# Patient Record
Sex: FEMALE | Race: WHITE | NOT HISPANIC OR LATINO | Employment: FULL TIME | URBAN - METROPOLITAN AREA
[De-identification: names, ages, dates, MRNs, and addresses within clinical notes are randomized per-mention and may not be internally consistent; named-entity substitution may affect disease eponyms.]

---

## 2017-03-10 ENCOUNTER — OFFICE VISIT (OUTPATIENT)
Dept: URGENT CARE | Facility: CLINIC | Age: 37
End: 2017-03-10
Payer: COMMERCIAL

## 2017-03-10 VITALS
DIASTOLIC BLOOD PRESSURE: 84 MMHG | SYSTOLIC BLOOD PRESSURE: 126 MMHG | RESPIRATION RATE: 20 BRPM | HEART RATE: 118 BPM | TEMPERATURE: 97.3 F | OXYGEN SATURATION: 96 %

## 2017-03-10 DIAGNOSIS — H66.002 ACUTE SUPPURATIVE OTITIS MEDIA OF LEFT EAR WITHOUT SPONTANEOUS RUPTURE OF TYMPANIC MEMBRANE, RECURRENCE NOT SPECIFIED: ICD-10-CM

## 2017-03-10 DIAGNOSIS — J06.9 UPPER RESPIRATORY INFECTION WITH COUGH AND CONGESTION: ICD-10-CM

## 2017-03-10 DIAGNOSIS — J45.901 ASTHMA EXACERBATION: ICD-10-CM

## 2017-03-10 PROCEDURE — 99214 OFFICE O/P EST MOD 30 MIN: CPT | Performed by: NURSE PRACTITIONER

## 2017-03-10 RX ORDER — AMOXICILLIN 875 MG/1
875 TABLET, COATED ORAL 2 TIMES DAILY
Qty: 14 TAB | Refills: 0 | Status: SHIPPED | OUTPATIENT
Start: 2017-03-10 | End: 2017-03-17

## 2017-03-10 RX ORDER — ETONOGESTREL/ETHINYL ESTRADIOL .12-.015MG
RING, VAGINAL VAGINAL
Refills: 12 | COMMUNITY
Start: 2017-02-07 | End: 2021-07-19

## 2017-03-10 RX ORDER — BENZONATATE 100 MG/1
100 CAPSULE ORAL 3 TIMES DAILY PRN
Qty: 30 CAP | Refills: 0 | Status: SHIPPED | OUTPATIENT
Start: 2017-03-10 | End: 2019-02-21

## 2017-03-10 NOTE — MR AVS SNAPSHOT
Gladys Lombardi   3/10/2017 11:15 AM   Office Visit   MRN: [id number]    Department:  [facility]   Dept Phone:  [phone]    Description:  Female : 1980   Provider:  ALYSA Szymanski           Reason for Visit     Cough SOB, sore throat, left ear pain X 3 days       Allergies as of 3/10/2017     No Known Allergies      You were diagnosed with     Asthma exacerbation   [602809]       Upper respiratory infection with cough and congestion   [807276]       Acute suppurative otitis media of left ear without spontaneous rupture of tympanic membrane, recurrence not specified   [[id number]]         Vital Signs     Blood Pressure Pulse Temperature Respirations Oxygen Saturation Smoking Status    126/84 mmHg 130 36.3 °C (97.4 °F) 20 96% Heavy Tobacco Smoker      Basic Information     Date Of Birth Sex Race Ethnicity Preferred Language    1980 Female White Non- English      Health Maintenance        Date Due Completion Dates    IMM DTaP/Tdap/Td Vaccine (1 - Tdap) 1999 ---    PAP SMEAR 2001 ---    IMM INFLUENZA (1) 2016 ---            Current Immunizations     No immunizations on file.      Below and/or attached are the medications your provider expects you to take. Review all of your home medications and newly ordered medications with your provider and/or pharmacist. Follow medication instructions as directed by your provider and/or pharmacist. Please keep your medication list with you and share with your provider. Update the information when medications are discontinued, doses are changed, or new medications (including over-the-counter products) are added; and carry medication information at all times in the event of emergency situations     Allergies:  No Known Allergies          Medications  Valid as of: March 10, 2017 - 12:23 PM    Generic Name Brand Name Tablet Size Instructions for use    Albuterol Sulfate (Aero Soln) PROAIR  (90 BASE) MCG/ACT Inhale 1-2  Puffs by mouth every 6 hours as needed for Shortness of Breath. INHALE 2 PUFFS BY MOUTH EVERY 6 HOURS AS NEEDED FOR SHORTNESS OF BREATH.        Amoxicillin (Tab) AMOXIL 875 MG Take 1 Tab by mouth 2 times a day for 7 days.        Benzonatate (Cap) TESSALON 100 MG Take 1 Cap by mouth 3 times a day as needed.        Etonogestrel-Ethinyl Estradiol (RING) NUVARING 0.12-0.015 MG/24HR USE VAGINALLY AS DIRECTED BY MD        Hydrocod Polst-Chlorphen Polst (Suspension Extended Release) TUSSIONEX 10-8 MG/5ML Take 5 mL by mouth every 12 hours as needed for Cough or Rhinitis.        Omeprazole   Take  by mouth.        .                 Medicines prescribed today were sent to:     [id number] - [place], [place] - [address]    [address] [address]    Phone: [phone] Fax: [phone]    Open 24 Hours?: No      Medication refill instructions:       If your prescription bottle indicates you have medication refills left, it is not necessary to call your provider’s office. Please contact your pharmacy and they will refill your medication.    If your prescription bottle indicates you do not have any refills left, you may request refills at any time through one of the following ways: The online evOLED system (except Urgent Care), by calling your provider’s office, or by asking your pharmacy to contact your provider’s office with a refill request. Medication refills are processed only during regular business hours and may not be available until the next business day. Your provider may request additional information or to have a follow-up visit with you prior to refilling your medication.   *Please Note: Medication refills are assigned a new Rx number when refilled electronically. Your pharmacy may indicate that no refills were authorized even though a new prescription for the same medication is available at the pharmacy. Please request the medicine by name with the pharmacy before contacting your provider for a  refill.           MyChart Access Code: Activation code not generated  Current MyChart Status: Active          Quit Tobacco Information     Do you want to quit using tobacco?    Quitting tobacco decreases risks of cancer, heart and lung disease, increases life expectancy, improves sense of taste and smell, and increases spending money, among other benefits.    If you are thinking about quitting, we can help.  • [facility] Quit Tobacco Program: 998.470.6877  o Program occurs weekly for four weeks and includes pharmacist consultation on products to support quitting smoking or chewing tobacco. A provider referral is needed for pharmacist consultation.  • Tobacco Users Help Hotline: 4-800QUIT-NOW (617-1176) or https://[place].quitlogix.org/  o Free, confidential telephone and online coaching for [place] residents. Sessions are designed on a schedule that is convenient for you. Eligible clients receive free nicotine replacement therapy.  • Nationally: www.smokefree.gov  o Information and professional assistance to support both immediate and long-term needs as you become, and remain, a non-smoker. Smokefree.gov allows you to choose the help that best fits your needs.

## 2017-03-10 NOTE — Clinical Note
March 10, 2017         Patient: Gladys Lombardi   YOB: 1980   Date of Visit: 3/10/2017           To Whom it May Concern:    Gladys Lombardi was seen in my clinic on 3/10/2017. She may be excused from work today.     If you have any questions or concerns, please don't hesitate to call.        Sincerely,           BARBIE Szymanski.  Electronically Signed

## 2017-03-10 NOTE — PROGRESS NOTES
Chief Complaint   Patient presents with   • Cough     SOB, sore throat, left ear pain X 3 days        HISTORY OF PRESENT ILLNESS: Patient is a 36 y.o. female who presents today due to symptoms that started three days ago. Pt reports a dry cough, sore throat, congestion, body aches, wheezing, and fever. She awoke this morning with left ear pain, denies drainage. Denies chest pain, shortness of breath. Admits to h/o asthma and ear infections as a child, has been using her inhaler more often since onset and is almost out of her albuterol. No immunocompromise. Has tried OTC cold medications without significant relief of symptoms. No recent ABX use. No other aggravating or alleviating factors.     There are no active problems to display for this patient.      Allergies:Review of patient's allergies indicates no known allergies.    Current Outpatient Prescriptions Ordered in [employer]   Medication Sig Dispense Refill   • OMEPRAZOLE PO Take  by mouth.     • Etonogestrel-Ethinyl Estradiol (NUVARING VA) Insert  in vagina.       No current Epic-ordered facility-administered medications on file.       History reviewed. No pertinent past medical history.    Social History   Substance Use Topics   • Smoking status: Heavy Tobacco Smoker   • Smokeless tobacco: Never Used   • Alcohol Use: None       No family status information on file.   History reviewed. No pertinent family history.    ROS:  Review of Systems   Constitutional: Positive for subjective fever, chills, fatigue. Negative for weight loss and malaise.  HENT: Positive for left ear pain, congestion and sore throat. Negative for nosebleeds, and neck pain.    Eyes: Negative for vision changes.   Cardiovascular: Negative for chest pain, palpitations, orthopnea and leg swelling.   Respiratory: Positive for cough and sputum production. Negative for shortness of breath and wheezing.   Gastrointestinal: Negative for abdominal pain, nausea, vomiting or diarrhea.   Skin: Negative for  rash, diaphoresis.     Exam:  /84 mmHg  Pulse 118  Temp(Src) 36.3 °C (97.3 °F)  Resp 20  SpO2 96%  General: well-nourished, well-developed female in NAD  Head: normocephalic, atraumatic  Eyes: PERRLA, EOM within normal limits, no conjunctival injection, no scleral icterus, visual fields and acuity grossly intact.  Ears: normal shape and symmetry, no tenderness, no discharge. External canals are without any significant edema or erythema. Left tympanic membrane is erythematous, injected, bulging. Right tympanic membrane is without any inflammation, no effusion. Gross auditory acuity is intact.  Nose: symmetrical without tenderness, mild discharge, erythema present bilateral nares.  Mouth/Throat: reasonable hygiene, no exudates or tonsillar enlargement. Erythema present.   Neck: no masses, range of motion within normal limits, no tracheal deviation.  Lymph: mild cervical adenopathy. No supraclavicular adenopathy.   Neuro: alert and oriented. Cranial nerves 1-12 grossly intact.   Cardiovascular: tachycardic rate and regular rhythm without murmurs, rubs, or gallops. No edema.   Pulmonary: no distress. Chest is symmetrical with respiration, no wheezes, crackles, or rhonchi.   Musculoskeletal: appropriate muscle tone, gait is stable.  Skin: warm, dry, intact, no clubbing, no cyanosis.   Psych: appropriate mood, affect, judgement.         Assessment/Plan:  1. Upper respiratory infection with cough and congestion  Hydrocod Polst-CPM Polst ER (TUSSIONEX) 10-8 MG/5ML Suspension Extended Release    benzonatate (TESSALON) 100 MG Cap   2. Acute suppurative otitis media of left ear without spontaneous rupture of tympanic membrane, recurrence not specified  amoxicillin (AMOXIL) 875 MG tablet   3. Asthma exacerbation  PROAIR  (90 BASE) MCG/ACT Aero Soln inhalation aerosol           Amoxicillin as directed for otitis media. Probiotic use encouraged. Tessalon and Tussionex for cough, sedative effects of medication  discussed with patient.   Rest, increase fluids, hand and respiratory hygiene. Albuterol for wheezing.   May take OTC medications as directed for symptom relief. Honey for cough.   Supportive care, differential diagnoses, and indications for immediate follow-up discussed with patient.   Pathogenesis of diagnosis discussed including typical length and natural progression.  Instructed to return to clinic or nearest emergency department for any change in condition, further concerns, or worsening of symptoms.  Patient states understanding of the plan of care and discharge instructions.  Instructed to make an appointment with their primary care provider in the next 3-7 days if not significantly improving and for further care.         Please note that this dictation was created using voice recognition software. I have made every reasonable attempt to correct obvious errors, but I expect that there are errors of grammar and possibly content that I did not discover before finalizing the note.      BARBIE Nieto.

## 2017-03-30 ENCOUNTER — OFFICE VISIT (OUTPATIENT)
Dept: URGENT CARE | Facility: CLINIC | Age: 37
End: 2017-03-30
Payer: COMMERCIAL

## 2017-03-30 VITALS
BODY MASS INDEX: 39.08 KG/M2 | TEMPERATURE: 98.2 F | OXYGEN SATURATION: 93 % | SYSTOLIC BLOOD PRESSURE: 124 MMHG | WEIGHT: 249 LBS | HEART RATE: 107 BPM | HEIGHT: 67 IN | DIASTOLIC BLOOD PRESSURE: 78 MMHG | RESPIRATION RATE: 14 BRPM

## 2017-03-30 DIAGNOSIS — J01.00 ACUTE NON-RECURRENT MAXILLARY SINUSITIS: Primary | ICD-10-CM

## 2017-03-30 DIAGNOSIS — R06.2 INSPIRATORY WHEEZE ON EXAMINATION: ICD-10-CM

## 2017-03-30 DIAGNOSIS — J45.901 ASTHMA EXACERBATION: ICD-10-CM

## 2017-03-30 PROCEDURE — 99214 OFFICE O/P EST MOD 30 MIN: CPT | Mod: 25 | Performed by: PHYSICIAN ASSISTANT

## 2017-03-30 PROCEDURE — 94640 AIRWAY INHALATION TREATMENT: CPT | Performed by: PHYSICIAN ASSISTANT

## 2017-03-30 RX ORDER — PREDNISONE 20 MG/1
TABLET ORAL
Qty: 7 TAB | Refills: 0 | Status: SHIPPED | OUTPATIENT
Start: 2017-03-30 | End: 2017-04-04

## 2017-03-30 RX ORDER — IPRATROPIUM BROMIDE AND ALBUTEROL SULFATE 2.5; .5 MG/3ML; MG/3ML
3 SOLUTION RESPIRATORY (INHALATION) ONCE
Status: COMPLETED | OUTPATIENT
Start: 2017-03-30 | End: 2017-03-30

## 2017-03-30 RX ORDER — CEFDINIR 300 MG/1
300 CAPSULE ORAL 2 TIMES DAILY
Qty: 20 CAP | Refills: 0 | Status: SHIPPED | OUTPATIENT
Start: 2017-03-30 | End: 2017-04-09

## 2017-03-30 RX ADMIN — IPRATROPIUM BROMIDE AND ALBUTEROL SULFATE 3 ML: 2.5; .5 SOLUTION RESPIRATORY (INHALATION) at 14:58

## 2017-03-30 ASSESSMENT — ENCOUNTER SYMPTOMS
EYES NEGATIVE: 1
CHILLS: 1
DIZZINESS: 1
PSYCHIATRIC NEGATIVE: 1
SHORTNESS OF BREATH: 1
SPUTUM PRODUCTION: 1
WHEEZING: 1
COUGH: 1
GASTROINTESTINAL NEGATIVE: 1
SORE THROAT: 1
CARDIOVASCULAR NEGATIVE: 1
BACK PAIN: 1
FEVER: 1

## 2017-03-30 NOTE — PROGRESS NOTES
Subjective:      Gladys Lombardi is a 36 y.o. female who presents with Sinusitis            Sinusitis  Associated symptoms include chills, congestion, coughing, ear pain, shortness of breath and a sore throat.     Chief Complaint   Patient presents with   • Sinusitis     pt state she came in a bout 3 weeks ago and she is still not feeling better. pt statres she still has sore throat and bilateral ear pain that comes and goes and (L) ear feels plugged       HPI:  Gladys Lombardi is a 36 y.o. female who presents with continued uri now 3-4 weeks.  Was treated with amoxil 875mg for 7 days for OM which the otalgia did improve.  Now off abx for 13 days.  Female friend with similar symptoms who likely re-infected patient.  Sore throat is worse in the morning and at night.  Left ear infection improved. But now getting eustachian tube dysfunction switches sides.  Cough is productive with green and thicker. Feeling wheezy and SOB.  Some fever and chills.      Hx of asthma, using inhaler more often 3-4 times a day, normally a couple times a week.    No flu shot.    Smokes half a pack a day, fewer last couple days  No past medical history on file.    No past surgical history on file.    No family history on file.    Social History     Social History   • Marital Status: Single     Spouse Name: N/A   • Number of Children: N/A   • Years of Education: N/A     Occupational History   • Not on file.     Social History Main Topics   • Smoking status: Heavy Tobacco Smoker   • Smokeless tobacco: Never Used   • Alcohol Use: Not on file   • Drug Use: Not on file   • Sexual Activity: Not on file     Other Topics Concern   • Not on file     Social History Narrative         Current outpatient prescriptions:   •  NUVARING, USE VAGINALLY AS DIRECTED BY MD, 3/30/2017  •  PROAIR HFA, 1-2 Puff, Inhalation, Q6HRS PRN, 3/30/2017  •  OMEPRAZOLE PO, Take  by mouth., 3/30/2017  •  Hydrocod Polst-CPM Polst ER, 5 mL, Oral, Q12HRS PRN  •  benzonatate,  "100 mg, Oral, TID PRN    No Known Allergies         Review of Systems   Constitutional: Positive for fever, chills and malaise/fatigue.   HENT: Positive for congestion, ear pain, hearing loss and sore throat.    Eyes: Negative.    Respiratory: Positive for cough, sputum production, shortness of breath and wheezing.    Cardiovascular: Negative.    Gastrointestinal: Negative.    Genitourinary: Negative.    Musculoskeletal: Positive for back pain and joint pain.   Neurological: Positive for dizziness.   Endo/Heme/Allergies: Negative.    Psychiatric/Behavioral: Negative.           Objective:     /78 mmHg  Pulse 107  Temp(Src) 36.8 °C (98.2 °F)  Resp 14  Ht 1.702 m (5' 7\")  Wt 112.946 kg (249 lb)  BMI 38.99 kg/m2  SpO2 93%     Physical Exam       Physical Exam   Nursing note and vitals reviewed.    Constitutional:   Appropriately groomed, pleasant affect, well nourished, in NAD.    Head:   Normocephalic, atraumatic.    Eyes:   PERRLA, EOM's full, sclera white, conjunctiva not erythematous, and medial canthus without exudate bilaterally.    Ears:  Auricle and tragus non-tender to manipulation.  No pre-auricular lymphadenopathy or mastoid ttp.  EACs with mild cerumen bilaterally, not erythematous.  TM’s pearly gray with cone of light present and umbo and malleolus visible bilaterally.  No bulging or fluid bubbles present in middle ear.  Hearing grossly intact to voice.    Nose:  Nares not patent bilaterally.  Nasal mucosa edematous with yellow-white rhinorrhea bilaterally. Maxillary sinuses exquisitely tender to percussion bilaterally.    Throat:  Dentition wnl, mucosa moist without lesions.  Oropharynx erythematous, with no enlargement of the palatine tonsils bilaterally with no exudates.    Post nasal drainage present.  Soft palate rises symmetrically bilaterally and uvula midline.      Neck: Neck supple, with mild anterior lymphadenopathy that is soft and mobile to palpation. Thyroid non-palpable without " tenderness or nodules. No supraclavicular lymphadenopathy.    Lungs:  Respiratory effort not labored without accessory muscle use.  Lungs with inspriatory wheeze and tight  to auscultation bilaterally.  No rales. Rhonchi clear to cough.    Heart:  RRR, without murmurs rubs or gallops.  Radial and dorsalis pedis pulse 2+ bilaterally.  No LE edema.    Musculoskeletal:  Gait non-antalgic with a narrow base.    Derm:  Skin without rashes or lesions with good turgor pressure.      Psychiatric:  Mood, affect, and judgement appropriate.        Assessment/Plan:     1. Inspiratory wheeze on examination    - ipratropium-albuterol (DUONEB) nebulizer solution 3 mL; 3 mL by Nebulization route Once.  - cefdinir (OMNICEF) 300 MG Cap; Take 1 Cap by mouth 2 times a day for 10 days.  Dispense: 20 Cap; Refill: 0  - predniSONE (DELTASONE) 20 MG Tab; 2 tabs PO x 2 days, then 1 tabs PO x 2 days, then 1/2 tabs x 2 days.  Take with food in the morning.  Dispense: 7 Tab; Refill: 0  - Hydrocod Polst-CPM Polst ER (TUSSIONEX PENNKINETIC ER) 10-8 MG/5ML Suspension Extended Release; Take 5 mL by mouth every 12 hours. Take if needed for nasal symptoms or cough. May cause drowsiness.  Dispense: 120 mL; Refill: 0    2. Acute non-recurrent maxillary sinusitis    - cefdinir (OMNICEF) 300 MG Cap; Take 1 Cap by mouth 2 times a day for 10 days.  Dispense: 20 Cap; Refill: 0  - predniSONE (DELTASONE) 20 MG Tab; 2 tabs PO x 2 days, then 1 tabs PO x 2 days, then 1/2 tabs x 2 days.  Take with food in the morning.  Dispense: 7 Tab; Refill: 0  - Hydrocod Polst-CPM Polst ER (TUSSIONEX PENNKINETIC ER) 10-8 MG/5ML Suspension Extended Release; Take 5 mL by mouth every 12 hours. Take if needed for nasal symptoms or cough. May cause drowsiness.  Dispense: 120 mL; Refill: 0    3. Asthma exacerbation    - cefdinir (OMNICEF) 300 MG Cap; Take 1 Cap by mouth 2 times a day for 10 days.  Dispense: 20 Cap; Refill: 0  - predniSONE (DELTASONE) 20 MG Tab; 2 tabs PO x 2 days,  then 1 tabs PO x 2 days, then 1/2 tabs x 2 days.  Take with food in the morning.  Dispense: 7 Tab; Refill: 0  - Hydrocod Polst-CPM Polst ER (TUSSIONEX PENNKINETIC ER) 10-8 MG/5ML Suspension Extended Release; Take 5 mL by mouth every 12 hours. Take if needed for nasal symptoms or cough. May cause drowsiness.  Dispense: 120 mL; Refill: 0     PAtient presents with 3-4 weeks of URI tx with Amoxicillin 875 x 7 days for OM.  Now with eustachian tube dysfunction and worsening sinus pressure. On exam patient tachycardic with a 02 sat at 93%RA.  Coryza with maxillary sinus ttp and right anterior cervical chain and submandibular lymphadenopathy.  Lungs with inspriatory wheeze and tight.  Post duoneb 02 at 98%RA.  Lungs clear.  Suspect BRS not completed tx with amoxicillin 875.  Given smoking and asthma exacerbation, plan to tx with steroid pulse and taper.  RX cefdinir for BRS and OM coverage and given recent PCN use.  MAy consider augmentin if cefdinir too expensive.  REviewed symptom support measures.    Patient was in agreement with this treatment plan and seemed to understand without barriers. All questions were encouraged and answered.  Reviewed signs and symptoms of when to seek emergency medical care.

## 2017-03-30 NOTE — MR AVS SNAPSHOT
"        Gladys Lombardi   3/30/2017 2:15 PM   Office Visit   MRN: [id number]    Department:  [facility]   Dept Phone:  [phone]    Description:  Female : 1980   Provider:  Seamus Arriola PA-C           Reason for Visit     Sinusitis pt state she came in a bout 3 weeks ago and she is still not feeling better. pt statres she still has sore throat and bilateral ear pain that comes and goes and (L) ear feels plugged      Allergies as of 3/30/2017     No Known Allergies      You were diagnosed with     Acute non-recurrent maxillary sinusitis   [[id number]]  -  Primary     Inspiratory wheeze on examination   [[id number]]       Asthma exacerbation   [[id number]]         Vital Signs     Blood Pressure Pulse Temperature Respirations Height Weight    124/78 mmHg 107 36.8 °C (98.2 °F) 14 1.702 m (5' 7\") 112.946 kg (249 lb)    Body Mass Index Oxygen Saturation Smoking Status             38.99 kg/m2 93% Heavy Tobacco Smoker         Basic Information     Date Of Birth Sex Race Ethnicity Preferred Language    1980 Female White Non- English      Health Maintenance        Date Due Completion Dates    IMM DTaP/Tdap/Td Vaccine (1 - Tdap) 1999 ---    PAP SMEAR 2001 ---    IMM INFLUENZA (1) 2016 ---            Current Immunizations     No immunizations on file.      Below and/or attached are the medications your provider expects you to take. Review all of your home medications and newly ordered medications with your provider and/or pharmacist. Follow medication instructions as directed by your provider and/or pharmacist. Please keep your medication list with you and share with your provider. Update the information when medications are discontinued, doses are changed, or new medications (including over-the-counter products) are added; and carry medication information at all times in the event of emergency situations     Allergies:  No Known Allergies          Medications  Valid as of: " 2017 -  3:52 PM    Generic Name Brand Name Tablet Size Instructions for use    Albuterol Sulfate (Aero Soln) PROAIR  (90 BASE) MCG/ACT Inhale 1-2 Puffs by mouth every 6 hours as needed for Shortness of Breath. INHALE 2 PUFFS BY MOUTH EVERY 6 HOURS AS NEEDED FOR SHORTNESS OF BREATH.        Benzonatate (Cap) TESSALON 100 MG Take 1 Cap by mouth 3 times a day as needed.        Cefdinir (Cap) OMNICEF 300 MG Take 1 Cap by mouth 2 times a day for 10 days.        Etonogestrel-Ethinyl Estradiol (RING) NUVARING 0.12-0.015 MG/24HR USE VAGINALLY AS DIRECTED BY MD        Hydrocod Polst-Chlorphen Polst (Suspension Extended Release) TUSSIONEX 10-8 MG/5ML Take 5 mL by mouth every 12 hours as needed for Cough or Rhinitis.        Hydrocod Polst-Chlorphen Polst (Suspension Extended Release) TUSSIONEX 10-8 MG/5ML Take 5 mL by mouth every 12 hours. Take if needed for nasal symptoms or cough. May cause drowsiness.        Omeprazole   Take  by mouth.        PredniSONE (Tab) DELTASONE 20 MG 2 tabs PO x 2 days, then 1 tabs PO x 2 days, then 1/2 tabs x 2 days.  Take with food in the morning.        .                 Medicines prescribed today were sent to:     [id number] [place], [place] - [address]    [address] [address]    Phone: [phone] Fax: [phone]    Open 24 Hours?: No      Medication refill instructions:       If your prescription bottle indicates you have medication refills left, it is not necessary to call your provider’s office. Please contact your pharmacy and they will refill your medication.    If your prescription bottle indicates you do not have any refills left, you may request refills at any time through one of the following ways: The online [employer] system (except Urgent Care), by calling your provider’s office, or by asking your pharmacy to contact your provider’s office with a refill request. Medication refills are processed only during regular business hours and may not be available  until the next business day. Your provider may request additional information or to have a follow-up visit with you prior to refilling your medication.   *Please Note: Medication refills are assigned a new Rx number when refilled electronically. Your pharmacy may indicate that no refills were authorized even though a new prescription for the same medication is available at the pharmacy. Please request the medicine by name with the pharmacy before contacting your provider for a refill.        Instructions    Start omnicef tonight.  Stay hydrated.  Inhaler every 4 hours, 2 puffs.  Start the prednisone today (steroid).  Take all of the medication scheduled for that day at one time with food.  It's best to take this in the morning.  While on prednisone avoid NSAIDs (aleve, ibuprofen, advil).  Tylenol is OK.   Increase fluids, tea with honey.    Sinusitis, Adult  Sinusitis is redness, soreness, and inflammation of the paranasal sinuses. Paranasal sinuses are air pockets within the bones of your face. They are located beneath your eyes, in the middle of your forehead, and above your eyes. In healthy paranasal sinuses, mucus is able to drain out, and air is able to circulate through them by way of your nose. However, when your paranasal sinuses are inflamed, mucus and air can become trapped. This can allow bacteria and other germs to grow and cause infection.  Sinusitis can develop quickly and last only a short time (acute) or continue over a long period (chronic). Sinusitis that lasts for more than 12 weeks is considered chronic.  CAUSES  Causes of sinusitis include:  · Allergies.  · Structural abnormalities, such as displacement of the cartilage that separates your nostrils (deviated septum), which can decrease the air flow through your nose and sinuses and affect sinus drainage.  · Functional abnormalities, such as when the small hairs (cilia) that line your sinuses and help remove mucus do not work properly or are not  present.  SIGNS AND SYMPTOMS  Symptoms of acute and chronic sinusitis are the same. The primary symptoms are pain and pressure around the affected sinuses. Other symptoms include:  · Upper toothache.  · Earache.  · Headache.  · Bad breath.  · Decreased sense of smell and taste.  · A cough, which worsens when you are lying flat.  · Fatigue.  · Fever.  · Thick drainage from your nose, which often is green and may contain pus (purulent).  · Swelling and warmth over the affected sinuses.  DIAGNOSIS  Your health care provider will perform a physical exam. During your exam, your health care provider may perform any of the following to help determine if you have acute sinusitis or chronic sinusitis:  · Look in your nose for signs of abnormal growths in your nostrils (nasal polyps).  · Tap over the affected sinus to check for signs of infection.  · View the inside of your sinuses using an imaging device that has a light attached (endoscope).  If your health care provider suspects that you have chronic sinusitis, one or more of the following tests may be recommended:  · Allergy tests.  · Nasal culture. A sample of mucus is taken from your nose, sent to a lab, and screened for bacteria.  · Nasal cytology. A sample of mucus is taken from your nose and examined by your health care provider to determine if your sinusitis is related to an allergy.  TREATMENT  Most cases of acute sinusitis are related to a viral infection and will resolve on their own within 10 days. Sometimes, medicines are prescribed to help relieve symptoms of both acute and chronic sinusitis. These may include pain medicines, decongestants, nasal steroid sprays, or saline sprays.  However, for sinusitis related to a bacterial infection, your health care provider will prescribe antibiotic medicines. These are medicines that will help kill the bacteria causing the infection.  Rarely, sinusitis is caused by a fungal infection. In these cases, your health care  provider will prescribe antifungal medicine.  For some cases of chronic sinusitis, surgery is needed. Generally, these are cases in which sinusitis recurs more than 3 times per year, despite other treatments.  HOME CARE INSTRUCTIONS  · Drink plenty of water. Water helps thin the mucus so your sinuses can drain more easily.  · Use a humidifier.  · Inhale steam 3-4 times a day (for example, sit in the bathroom with the shower running).  · Apply a warm, moist washcloth to your face 3-4 times a day, or as directed by your health care provider.  · Use saline nasal sprays to help moisten and clean your sinuses.  · Take medicines only as directed by your health care provider.  · If you were prescribed either an antibiotic or antifungal medicine, finish it all even if you start to feel better.  SEEK IMMEDIATE MEDICAL CARE IF:  · You have increasing pain or severe headaches.  · You have nausea, vomiting, or drowsiness.  · You have swelling around your face.  · You have vision problems.  · You have a stiff neck.  · You have difficulty breathing.     This information is not intended to replace advice given to you by your health care provider. Make sure you discuss any questions you have with your health care provider.     Document Released: 12/18/2006 Document Revised: 01/08/2016 Document Reviewed: 01/01/2013  [employer] Interactive Patient Education ©2016 [employer] Inc.            [employer] Access Code: Activation code not generated  Current AppSameharBacktrace I/O Status: Active          Quit Tobacco Information     Do you want to quit using tobacco?    Quitting tobacco decreases risks of cancer, heart and lung disease, increases life expectancy, improves sense of taste and smell, and increases spending money, among other benefits.    If you are thinking about quitting, we can help.  • [facility] Quit Tobacco Program: 880-222-8074  o Program occurs weekly for four weeks and includes pharmacist consultation on products to support quitting smoking or  chewing tobacco. A provider referral is needed for pharmacist consultation.  • Tobacco Users Help Hotline: 0-800QUIT-NOW (322-1007) or https://[place].quitlogix.org/  o Free, confidential telephone and online coaching for [place] residents. Sessions are designed on a schedule that is convenient for you. Eligible clients receive free nicotine replacement therapy.  • Nationally: www.smokefree.gov  o Information and professional assistance to support both immediate and long-term needs as you become, and remain, a non-smoker. Smokefree.gov allows you to choose the help that best fits your needs.

## 2017-03-30 NOTE — PATIENT INSTRUCTIONS
Start omnicef tonight.  Stay hydrated.  Inhaler every 4 hours, 2 puffs.  Start the prednisone today (steroid).  Take all of the medication scheduled for that day at one time with food.  It's best to take this in the morning.  While on prednisone avoid NSAIDs (aleve, ibuprofen, advil).  Tylenol is OK.   Increase fluids, tea with honey.    Sinusitis, Adult  Sinusitis is redness, soreness, and inflammation of the paranasal sinuses. Paranasal sinuses are air pockets within the bones of your face. They are located beneath your eyes, in the middle of your forehead, and above your eyes. In healthy paranasal sinuses, mucus is able to drain out, and air is able to circulate through them by way of your nose. However, when your paranasal sinuses are inflamed, mucus and air can become trapped. This can allow bacteria and other germs to grow and cause infection.  Sinusitis can develop quickly and last only a short time (acute) or continue over a long period (chronic). Sinusitis that lasts for more than 12 weeks is considered chronic.  CAUSES  Causes of sinusitis include:  · Allergies.  · Structural abnormalities, such as displacement of the cartilage that separates your nostrils (deviated septum), which can decrease the air flow through your nose and sinuses and affect sinus drainage.  · Functional abnormalities, such as when the small hairs (cilia) that line your sinuses and help remove mucus do not work properly or are not present.  SIGNS AND SYMPTOMS  Symptoms of acute and chronic sinusitis are the same. The primary symptoms are pain and pressure around the affected sinuses. Other symptoms include:  · Upper toothache.  · Earache.  · Headache.  · Bad breath.  · Decreased sense of smell and taste.  · A cough, which worsens when you are lying flat.  · Fatigue.  · Fever.  · Thick drainage from your nose, which often is green and may contain pus (purulent).  · Swelling and warmth over the affected sinuses.  DIAGNOSIS  Your health  care provider will perform a physical exam. During your exam, your health care provider may perform any of the following to help determine if you have acute sinusitis or chronic sinusitis:  · Look in your nose for signs of abnormal growths in your nostrils (nasal polyps).  · Tap over the affected sinus to check for signs of infection.  · View the inside of your sinuses using an imaging device that has a light attached (endoscope).  If your health care provider suspects that you have chronic sinusitis, one or more of the following tests may be recommended:  · Allergy tests.  · Nasal culture. A sample of mucus is taken from your nose, sent to a lab, and screened for bacteria.  · Nasal cytology. A sample of mucus is taken from your nose and examined by your health care provider to determine if your sinusitis is related to an allergy.  TREATMENT  Most cases of acute sinusitis are related to a viral infection and will resolve on their own within 10 days. Sometimes, medicines are prescribed to help relieve symptoms of both acute and chronic sinusitis. These may include pain medicines, decongestants, nasal steroid sprays, or saline sprays.  However, for sinusitis related to a bacterial infection, your health care provider will prescribe antibiotic medicines. These are medicines that will help kill the bacteria causing the infection.  Rarely, sinusitis is caused by a fungal infection. In these cases, your health care provider will prescribe antifungal medicine.  For some cases of chronic sinusitis, surgery is needed. Generally, these are cases in which sinusitis recurs more than 3 times per year, despite other treatments.  HOME CARE INSTRUCTIONS  · Drink plenty of water. Water helps thin the mucus so your sinuses can drain more easily.  · Use a humidifier.  · Inhale steam 3-4 times a day (for example, sit in the bathroom with the shower running).  · Apply a warm, moist washcloth to your face 3-4 times a day, or as directed  by your health care provider.  · Use saline nasal sprays to help moisten and clean your sinuses.  · Take medicines only as directed by your health care provider.  · If you were prescribed either an antibiotic or antifungal medicine, finish it all even if you start to feel better.  SEEK IMMEDIATE MEDICAL CARE IF:  · You have increasing pain or severe headaches.  · You have nausea, vomiting, or drowsiness.  · You have swelling around your face.  · You have vision problems.  · You have a stiff neck.  · You have difficulty breathing.     This information is not intended to replace advice given to you by your health care provider. Make sure you discuss any questions you have with your health care provider.     Document Released: 12/18/2006 Document Revised: 01/08/2016 Document Reviewed: 01/01/2013  [employer] Interactive Patient Education ©2016 [employer]

## 2018-04-14 ENCOUNTER — HOSPITAL ENCOUNTER (EMERGENCY)
Facility: MEDICAL CENTER | Age: 38
End: 2018-04-14
Attending: EMERGENCY MEDICINE
Payer: COMMERCIAL

## 2018-04-14 ENCOUNTER — APPOINTMENT (OUTPATIENT)
Dept: RADIOLOGY | Facility: MEDICAL CENTER | Age: 38
End: 2018-04-14
Attending: EMERGENCY MEDICINE
Payer: COMMERCIAL

## 2018-04-14 VITALS
OXYGEN SATURATION: 93 % | TEMPERATURE: 98.9 F | RESPIRATION RATE: 21 BRPM | HEIGHT: 67 IN | WEIGHT: 262.57 LBS | HEART RATE: 89 BPM | DIASTOLIC BLOOD PRESSURE: 103 MMHG | SYSTOLIC BLOOD PRESSURE: 165 MMHG | BODY MASS INDEX: 41.21 KG/M2

## 2018-04-14 DIAGNOSIS — E11.65 TYPE 2 DIABETES MELLITUS WITH HYPERGLYCEMIA, WITHOUT LONG-TERM CURRENT USE OF INSULIN (HCC): ICD-10-CM

## 2018-04-14 DIAGNOSIS — E66.01 MORBID OBESITY (HCC): ICD-10-CM

## 2018-04-14 DIAGNOSIS — B37.31 MONILIAL VAGINITIS: ICD-10-CM

## 2018-04-14 DIAGNOSIS — I10 UNCONTROLLED HYPERTENSION: ICD-10-CM

## 2018-04-14 LAB
ALBUMIN SERPL BCP-MCNC: 4.3 G/DL (ref 3.2–4.9)
ALBUMIN/GLOB SERPL: 1.4 G/DL
ALP SERPL-CCNC: 74 U/L (ref 30–99)
ALT SERPL-CCNC: 27 U/L (ref 2–50)
ANION GAP SERPL CALC-SCNC: 10 MMOL/L (ref 0–11.9)
APPEARANCE UR: CLEAR
AST SERPL-CCNC: 19 U/L (ref 12–45)
BACTERIA #/AREA URNS HPF: ABNORMAL /HPF
BASOPHILS # BLD AUTO: 1.2 % (ref 0–1.8)
BASOPHILS # BLD: 0.12 K/UL (ref 0–0.12)
BILIRUB SERPL-MCNC: 0.5 MG/DL (ref 0.1–1.5)
BILIRUB UR QL STRIP.AUTO: NEGATIVE
BUN SERPL-MCNC: 15 MG/DL (ref 8–22)
CALCIUM SERPL-MCNC: 8.7 MG/DL (ref 8.4–10.2)
CHLORIDE SERPL-SCNC: 102 MMOL/L (ref 96–112)
CO2 SERPL-SCNC: 21 MMOL/L (ref 20–33)
COLOR UR: YELLOW
CREAT SERPL-MCNC: 0.69 MG/DL (ref 0.5–1.4)
CULTURE IF INDICATED INDCX: NO UA CULTURE
EKG IMPRESSION: NORMAL
EOSINOPHIL # BLD AUTO: 0.29 K/UL (ref 0–0.51)
EOSINOPHIL NFR BLD: 2.9 % (ref 0–6.9)
EPI CELLS #/AREA URNS HPF: ABNORMAL /HPF
ERYTHROCYTE [DISTWIDTH] IN BLOOD BY AUTOMATED COUNT: 39.7 FL (ref 35.9–50)
GLOBULIN SER CALC-MCNC: 3 G/DL (ref 1.9–3.5)
GLUCOSE SERPL-MCNC: 189 MG/DL (ref 65–99)
GLUCOSE UR STRIP.AUTO-MCNC: 500 MG/DL
HCT VFR BLD AUTO: 43.7 % (ref 37–47)
HGB BLD-MCNC: 15.5 G/DL (ref 12–16)
IMM GRANULOCYTES # BLD AUTO: 0.04 K/UL (ref 0–0.11)
IMM GRANULOCYTES NFR BLD AUTO: 0.4 % (ref 0–0.9)
KETONES UR STRIP.AUTO-MCNC: ABNORMAL MG/DL
LEUKOCYTE ESTERASE UR QL STRIP.AUTO: NEGATIVE
LYMPHOCYTES # BLD AUTO: 4.15 K/UL (ref 1–4.8)
LYMPHOCYTES NFR BLD: 40.8 % (ref 22–41)
MCH RBC QN AUTO: 31.9 PG (ref 27–33)
MCHC RBC AUTO-ENTMCNC: 35.5 G/DL (ref 33.6–35)
MCV RBC AUTO: 89.9 FL (ref 81.4–97.8)
MICRO URNS: ABNORMAL
MONOCYTES # BLD AUTO: 0.7 K/UL (ref 0–0.85)
MONOCYTES NFR BLD AUTO: 6.9 % (ref 0–13.4)
MUCOUS THREADS #/AREA URNS HPF: ABNORMAL /HPF
NEUTROPHILS # BLD AUTO: 4.87 K/UL (ref 2–7.15)
NEUTROPHILS NFR BLD: 47.8 % (ref 44–72)
NITRITE UR QL STRIP.AUTO: NEGATIVE
NRBC # BLD AUTO: 0 K/UL
NRBC BLD-RTO: 0 /100 WBC
PH UR STRIP.AUTO: 6 [PH]
PLATELET # BLD AUTO: 205 K/UL (ref 164–446)
PMV BLD AUTO: 12.7 FL (ref 9–12.9)
POTASSIUM SERPL-SCNC: 3.6 MMOL/L (ref 3.6–5.5)
PROT SERPL-MCNC: 7.3 G/DL (ref 6–8.2)
PROT UR QL STRIP: NEGATIVE MG/DL
RBC # BLD AUTO: 4.86 M/UL (ref 4.2–5.4)
RBC # URNS HPF: ABNORMAL /HPF
RBC UR QL AUTO: ABNORMAL
SODIUM SERPL-SCNC: 133 MMOL/L (ref 135–145)
SP GR UR REFRACTOMETRY: 1.03
SP GR UR STRIP.AUTO: >=1.03
TROPONIN I SERPL-MCNC: <0.02 NG/ML (ref 0–0.04)
TSH SERPL DL<=0.005 MIU/L-ACNC: 2.06 UIU/ML (ref 0.38–5.33)
WBC # BLD AUTO: 10.2 K/UL (ref 4.8–10.8)
WBC #/AREA URNS HPF: ABNORMAL /HPF

## 2018-04-14 PROCEDURE — 96375 TX/PRO/DX INJ NEW DRUG ADDON: CPT

## 2018-04-14 PROCEDURE — 85025 COMPLETE CBC W/AUTO DIFF WBC: CPT

## 2018-04-14 PROCEDURE — 84484 ASSAY OF TROPONIN QUANT: CPT

## 2018-04-14 PROCEDURE — 36415 COLL VENOUS BLD VENIPUNCTURE: CPT

## 2018-04-14 PROCEDURE — 99285 EMERGENCY DEPT VISIT HI MDM: CPT

## 2018-04-14 PROCEDURE — A9270 NON-COVERED ITEM OR SERVICE: HCPCS | Performed by: EMERGENCY MEDICINE

## 2018-04-14 PROCEDURE — 71046 X-RAY EXAM CHEST 2 VIEWS: CPT

## 2018-04-14 PROCEDURE — 700102 HCHG RX REV CODE 250 W/ 637 OVERRIDE(OP): Performed by: EMERGENCY MEDICINE

## 2018-04-14 PROCEDURE — 93005 ELECTROCARDIOGRAM TRACING: CPT | Performed by: EMERGENCY MEDICINE

## 2018-04-14 PROCEDURE — 81001 URINALYSIS AUTO W/SCOPE: CPT

## 2018-04-14 PROCEDURE — 80053 COMPREHEN METABOLIC PANEL: CPT

## 2018-04-14 PROCEDURE — 94760 N-INVAS EAR/PLS OXIMETRY 1: CPT

## 2018-04-14 PROCEDURE — 84443 ASSAY THYROID STIM HORMONE: CPT

## 2018-04-14 PROCEDURE — 700101 HCHG RX REV CODE 250: Performed by: EMERGENCY MEDICINE

## 2018-04-14 PROCEDURE — 96374 THER/PROPH/DIAG INJ IV PUSH: CPT

## 2018-04-14 PROCEDURE — 700111 HCHG RX REV CODE 636 W/ 250 OVERRIDE (IP): Performed by: EMERGENCY MEDICINE

## 2018-04-14 RX ORDER — KETOROLAC TROMETHAMINE 30 MG/ML
30 INJECTION, SOLUTION INTRAMUSCULAR; INTRAVENOUS ONCE
Status: COMPLETED | OUTPATIENT
Start: 2018-04-14 | End: 2018-04-14

## 2018-04-14 RX ORDER — ASPIRIN 600 MG/1
300 SUPPOSITORY RECTAL ONCE
Status: COMPLETED | OUTPATIENT
Start: 2018-04-14 | End: 2018-04-14

## 2018-04-14 RX ORDER — METOPROLOL TARTRATE 1 MG/ML
5 INJECTION, SOLUTION INTRAVENOUS ONCE
Status: COMPLETED | OUTPATIENT
Start: 2018-04-14 | End: 2018-04-14

## 2018-04-14 RX ORDER — METOPROLOL TARTRATE 50 MG/1
50 TABLET, FILM COATED ORAL ONCE
Status: COMPLETED | OUTPATIENT
Start: 2018-04-14 | End: 2018-04-14

## 2018-04-14 RX ORDER — FLUCONAZOLE 200 MG/1
200 TABLET ORAL ONCE
Status: COMPLETED | OUTPATIENT
Start: 2018-04-14 | End: 2018-04-14

## 2018-04-14 RX ORDER — FLUCONAZOLE 200 MG/1
200 TABLET ORAL DAILY
Qty: 5 TAB | Refills: 0 | Status: SHIPPED | OUTPATIENT
Start: 2018-04-14 | End: 2019-02-21

## 2018-04-14 RX ORDER — LOSARTAN POTASSIUM AND HYDROCHLOROTHIAZIDE 12.5; 5 MG/1; MG/1
1 TABLET ORAL DAILY
Qty: 30 TAB | Refills: 0 | Status: SHIPPED | OUTPATIENT
Start: 2018-04-14 | End: 2019-02-21

## 2018-04-14 RX ORDER — ASPIRIN 81 MG/1
324 TABLET, CHEWABLE ORAL ONCE
Status: COMPLETED | OUTPATIENT
Start: 2018-04-14 | End: 2018-04-14

## 2018-04-14 RX ADMIN — METOPROLOL TARTRATE 5 MG: 5 INJECTION, SOLUTION INTRAVENOUS at 02:36

## 2018-04-14 RX ADMIN — KETOROLAC TROMETHAMINE 30 MG: 30 INJECTION, SOLUTION INTRAMUSCULAR; INTRAVENOUS at 02:35

## 2018-04-14 RX ADMIN — ASPIRIN 81 MG 324 MG: 81 TABLET ORAL at 02:35

## 2018-04-14 RX ADMIN — METOPROLOL TARTRATE 50 MG: 50 TABLET ORAL at 05:03

## 2018-04-14 RX ADMIN — FLUCONAZOLE 200 MG: 200 TABLET ORAL at 05:03

## 2018-04-14 ASSESSMENT — PAIN SCALES - GENERAL
PAINLEVEL_OUTOF10: 3
PAINLEVEL_OUTOF10: 0

## 2018-04-14 NOTE — ED PROVIDER NOTES
CHIEF COMPLAINT  Chief Complaint   Patient presents with   • Dizziness     for past 4 days   • Chest Pain     intermittantly for past 4 days, increwd b y eating and deep breath   • Vaginal Itching     Since yesterday, tried  over the counter cream  with no relief       HPI  Gladys Lombardi is a 37 y.o. female who presents tonight with a chief complaint of chest soreness that worsens by eating and deep inspiration, extreme rotational dizziness for the last 4 days. The chest pain is been going on for the last 24 hours. She denies any nausea, vomiting, diarrhea, fever, chills, productive cough, shortness of breath or diaphoresis. In addition in the last 12 hours she developed a itchy vaginal rash and dysuria. She states she tried over-the-counter anti-itch cream and Monistat but it has not helped. She states she has been told she is a borderline diabetic. She is also been told that she has hypertension and typically ranges around 160-170 systolic. She has not had her blood pressure prescription filled but is supposed to be taking losartan. She states she just quit smoking cigarettes a week ago and she's been trying hard not to go back to that habit so she has been somewhat anxious.    REVIEW OF SYSTEMS  See HPI for further details. All other system reviews are negative.    PAST MEDICAL HISTORY  Past Medical History:   Diagnosis Date   • Asthma    • Diabetes (CMS-HCC)     pre diabetic   • Hypertension        FAMILY HISTORY  History reviewed. No pertinent family history.    SOCIAL HISTORY  Social History     Social History   • Marital status: Single     Spouse name: N/A   • Number of children: N/A   • Years of education: N/A     Social History Main Topics   • Smoking status: Former Smoker   • Smokeless tobacco: Never Used   • Alcohol use 0.0 oz/week      Comment: 2-3   times a month   6-8 shots   • Drug use: Yes     Types: Inhaled      Comment: latoya   • Sexual activity: Not on file     Other Topics Concern   • Not  "on file     Social History Narrative   • No narrative on file       SURGICAL HISTORY  History reviewed. No pertinent surgical history.    CURRENT MEDICATIONS  Patient is supposed to be on losartan but has not had her prescription filled.  She is currently not on medication for her \"borderline\" diabetes    ALLERGIES  No Known Allergies    PHYSICAL EXAM  VITAL SIGNS: BP (!) 225/122   Pulse 95   Temp 37.2 °C (98.9 °F)   Resp 20   Ht 1.702 m (5' 7\")   Wt 119.1 kg (262 lb 9.1 oz)   LMP 03/31/2018   SpO2 95%   BMI 41.12 kg/m²     Constitutional: Patient is well developed, well nourished in mild distress, somewhat anxious.   HENT: Normocephalic, Oropharynx moist , nose normal with no drainage.   Eyes: PERRL, EOMI, Conjunctiva without erythema.   Neck: Supple,  Normal range of motion in flexion, extension and lateral rotation. No tenderness along the bony prominences or paraspinal muscles.  Lymphatic: No lymphadenopathy noted.   Cardiovascular: Normal heart rate and rhythm. No murmur.  Thorax & Lungs: Clear and equal breath sounds with good excursion. No respiratory distress, no rhonchi or wheezing.  Abdomen: Bowel sounds normal in all four quadrants. Soft,nontender,morbidly obese.   Skin: Warm, Dry, erythematous monilial vaginal rash.  Back: No cervical, thoracic, or lumbosacral tenderness.  Extremities: Peripheral pulses 4/4 No edema.  Musculoskeletal: Normal range of motion in all major joints.    Neurologic: Alert & oriented x 3, Normal motor function, Normal sensory function, No lateralizing or focal deficits noted. DTR's 4/4 bilaterally.  Psychiatric: Affect odd, Judgment normal, Mood anxious     EKG  12-lead EKG was performed and read by myself to show a normal sinus rhythm at a rate of 99 beats per minute. There is no acute ST elevation or depression. There is no ventricular ectopy. There is no axis deviation, A-V dissociation or QT prolongation. There are no old EKGs for " comparison.    RADIOLOGY/PROCEDURES  DX-CHEST-2 VIEWS   Final Result      1.  Unremarkable two view chest.            COURSE & MEDICAL DECISION MAKING  Pertinent Labs & Imaging studies reviewed. (See chart for details)  Patient received an IV of normal saline. Laboratories were drawn. Twelve-lead EKG shows a normal sinus rhythm with no acute changes. Chest x-ray was performed. Her blood sugar came back elevated at 189, electrolytes are unremarkable. LFTs are normal. Her white count is normal at 10.2 with a stable H&H. Troponin is less than 0.02. Urinalysis shows a large amount of glucose being spilled. I gave the patient metoprolol 5 mg IV push initially and her blood pressure came down nicely. I have monitored her for a period of time and gave her Diflucan 200 mg orally for her monilial vaginitis. Prior to discharge her blood pressure had risen again and I gave her Lopressor 50 mg orally. She will be discharged home with a prescription for Hyzaar and Diflucan. She is to increase fluids, no salt or sugars in her diet. She is to call 1st thing Monday morning to schedule 0.0 with a new primary care doctor as she states that Dr. Josue's office is closing. She is start exercising daily by walking, use the Monistat and plain white yogurt for her vaginitis and return if any problems. She is stable    FINAL IMPRESSION  1. Noncardiac chest pain  2. Hyperglycemia with diabetes mellitus type II  3. Morbid obesity  4. Uncontrolled hypertension  5. Monilial vaginitis  6. Anxiety         Electronically signed by: Yolanda Damon, 4/14/2018 2:54 [facility] Provider Note

## 2018-04-14 NOTE — ED NOTES
Pt ambulated to bathroom and back. Urine sample to lab by ED tech. Pt states she is feeling less dizzy. Pt's BP has decreased.

## 2018-04-14 NOTE — ED NOTES
Patient provided printed discharge instructions which included signs and symptoms to look out for, why to return to ER, and other follow up appointments to make. Patient provided prescriptions, information on medications, and how to . Patient stated they understand discharge instructions and had no further questions or concerns at this time. Patient discharged to home by self. Patient ambulated out of ER with stable gait.

## 2018-04-14 NOTE — ED NOTES
Pt  laying in bed. Pt c/o vertigo for past 4 days and intermittent chest pain that tends to appear after eating, throughout the last 4 days. Pt also c/o vaginal itching for past day and chronic right shoulder pain.

## 2018-04-14 NOTE — DISCHARGE INSTRUCTIONS
Candidal Vulvovaginitis  Candidal vulvovaginitis is an infection of the vagina and vulva. The vulva is the skin around the opening of the vagina. This may cause itching and discomfort in and around the vagina.   HOME CARE  · Only take medicine as told by your doctor.  · Do not have sex (intercourse) until the infection is healed or as told by your doctor.  · Practice safe sex.  · Tell your sex partner about your infection.  · Do not douche or use tampons.  · Wear cotton underwear. Do not wear tight pants or panty hose.  · Eat yogurt. This may help treat and prevent yeast infections.  GET HELP RIGHT AWAY IF:   · You have a fever.  · Your problems get worse during treatment or do not get better in 3 days.  · You have discomfort, irritation, or itching in your vagina or vulva area.  · You have pain after sex.  · You start to get belly (abdominal) pain.  MAKE SURE YOU:  · Understand these instructions.  · Will watch your condition.  · Will get help right away if you are not doing well or get worse.  Document Released: 03/16/2010 Document Revised: 03/11/2013 Document Reviewed: 03/16/2010  ExitCare® Patient Information ©2014 [employer].    Blood Glucose Monitoring, Adult  Monitoring your blood glucose (also know as blood sugar) helps you to manage your diabetes. It also helps you and your health care provider monitor your diabetes and determine how well your treatment plan is working.  WHY SHOULD YOU MONITOR YOUR BLOOD GLUCOSE?  · It can help you understand how food, exercise, and medicine affect your blood glucose.  · It allows you to know what your blood glucose is at any given moment. You can quickly tell if you are having low blood glucose (hypoglycemia) or high blood glucose (hyperglycemia).  · It can help you and your health care provider know how to adjust your medicines.  · It can help you understand how to manage an illness or adjust medicine for exercise.  WHEN SHOULD YOU TEST?  Your health care provider will  help you decide how often you should check your blood glucose. This may depend on the type of diabetes you have, your diabetes control, or the types of medicines you are taking. Be sure to write down all of your blood glucose readings so that this information can be reviewed with your health care provider. See below for examples of testing times that your health care provider may suggest.  Type 1 Diabetes  · Test at least 2 times per day if your diabetes is well controlled, if you are using an insulin pump, or if you perform multiple daily injections.  · If your diabetes is not well controlled or if you are sick, you may need to test more often.  · It is a good idea to also test:  ¨ Before every insulin injection.  ¨ Before and after exercise.  ¨ Between meals and 2 hours after a meal.  ¨ Occasionally between 2:00 a.m. and 3:00 a.m.  Type 2 Diabetes  · If you are taking insulin, test at least 2 times per day. However, it is best to test before every insulin injection.  · If you take medicines by mouth (orally), test 2 times a day.  · If you are on a controlled diet, test once a day.  · If your diabetes is not well controlled or if you are sick, you may need to monitor more often.  HOW TO MONITOR YOUR BLOOD GLUCOSE  Supplies Needed  · Blood glucose meter.  · Test strips for your meter. Each meter has its own strips. You must use the strips that go with your own meter.  · A pricking needle (lancet).  · A device that holds the lancet (lancing device).  · A journal or log book to write down your results.  Procedure  · Wash your hands with soap and water. Alcohol is not preferred.  · Prick the side of your finger (not the tip) with the lancet.  · Gently milk the finger until a small drop of blood appears.  · Follow the instructions that come with your meter for inserting the test strip, applying blood to the strip, and using your blood glucose meter.  Other Areas to Get Blood for Testing  Some meters allow you to use  "other areas of your body (other than your finger) to test your blood. These areas are called alternative sites. The most common alternative sites are:  · The forearm.  · The thigh.  · The back area of the lower leg.  · The palm of the hand.  The blood flow in these areas is slower. Therefore, the blood glucose values you get may be delayed, and the numbers are different from what you would get from your fingers. Do not use alternative sites if you think you are having hypoglycemia. Your reading will not be accurate. Always use a finger if you are having hypoglycemia. Also, if you cannot feel your lows (hypoglycemia unawareness), always use your fingers for your blood glucose checks.  ADDITIONAL TIPS FOR GLUCOSE MONITORING  · Do not reuse lancets.  · Always carry your supplies with you.  · All blood glucose meters have a 24-hour \"hotline\" number to call if you have questions or need help.  · Adjust (calibrate) your blood glucose meter with a control solution after finishing a few boxes of strips.  BLOOD GLUCOSE RECORD KEEPING  It is a good idea to keep a daily record or log of your blood glucose readings. Most glucose meters, if not all, keep your glucose records stored in the meter. Some meters come with the ability to download your records to your home computer. Keeping a record of your blood glucose readings is especially helpful if you are wanting to look for patterns. Make notes to go along with the blood glucose readings because you might forget what happened at that exact time. Keeping good records helps you and your health care provider to work together to achieve good diabetes management.      This information is not intended to replace advice given to you by your health care provider. Make sure you discuss any questions you have with your health care provider.     Document Released: 12/20/2004 Document Revised: 01/08/2016 Document Reviewed: 05/12/2014  Elsevier Interactive Patient Education ©2016 [employer] " [employer]    Diabetes, Frequently Asked Questions  WHAT IS DIABETES?  Most of the food we eat is turned into glucose (sugar). Our bodies use it for energy. The pancreas makes a hormone called insulin. It helps glucose get into the cells of our bodies. When you have diabetes, your body either does not make enough insulin or cannot use its own insulin as well as it should. This causes sugars to build up in your blood.  WHAT ARE THE SYMPTOMS OF DIABETES?  · Frequent urination.   · Excessive thirst.   · Unexplained weight loss.   · Extreme hunger.   · Blurred vision.   · Tingling or numbness in hands or feet.   · Feeling very tired much of the time.   · Dry, itchy skin.   · Sores that are slow to heal.   · Yeast infections.   WHAT ARE THE TYPES OF DIABETES?  Type 1 Diabetes   · About 10% of affected people have this type.   · Usually occurs before the age of 30.   · Usually occurs in thin to normal weight people.   Type 2 Diabetes  · About 90% of affected people have this type.   · Usually occurs after the age of 40.   · Usually occurs in overweight people.   · More likely to have:   · A family history of diabetes.   · A history of diabetes during pregnancy (gestational diabetes).   · High blood pressure.   · High cholesterol and triglycerides.   Gestational Diabetes  · Occurs in about 4% of pregnancies.   · Usually goes away after the baby is born.   · More likely to occur in women with:   · Family history of diabetes.   · Previous gestational diabetes.   · Obese.   · Over 25 years old.   WHAT IS PRE-DIABETES?  Pre-diabetes means your blood glucose is higher than normal, but lower than the diabetes range. It also means you are at risk of getting type 2 diabetes and heart disease. If you are told you have pre-diabetes, have your blood glucose checked again in 1 to 2 years.  WHAT IS THE TREATMENT FOR DIABETES?  Treatment is aimed at keeping blood glucose near normal levels at all times. Learning how to manage this yourself is  important in treating diabetes. Depending on the type of diabetes you have, your treatment will include one or more of the following:  · Monitoring your blood glucose.   · Meal planning.   · Exercise.   · Oral medicine (pills) or insulin.   CAN DIABETES BE PREVENTED?  With type 1 diabetes, prevention is more difficult, because the triggers that cause it are not yet known.  With type 2 diabetes, prevention is more likely, with lifestyle changes:  · Maintain a healthy weight.   · Eat healthy.   · Exercise.   IS THERE A CURE FOR DIABETES?  No, there is no cure for diabetes. There is a lot of research going on that is looking for a cure, and progress is being made. Diabetes can be treated and controlled. People with diabetes can manage their diabetes and lead normal, active lives.  SHOULD I BE TESTED FOR DIABETES?  If you are at least 45 years old, you should be tested for diabetes. You should be tested again every 3 years. If you are 45 or older and overweight, you may want to get tested more often. If you are younger than 45, overweight, and have one or more of the following risk factors, you should be tested:  · Family history of diabetes.   · Inactive lifestyle.   · High blood pressure.   WHAT ARE SOME OTHER SOURCES FOR INFORMATION ON DIABETES?  The following organizations may help in your search for more information on diabetes:  National Diabetes Education Program (NDEP)  Internet: http://www.ndep.nih.gov/resources  American Diabetes Association  Internet: http://www.diabetes.org   Juvenile Diabetes Foundation International  Internet: http://www.jdf.org  Document Released: 12/20/2004 Document Revised: 03/11/2013 Document Reviewed: 10/15/2010  ExitCare® Patient Information ©2013 [employer].  Diabetes Mellitus and Food  It is important for you to manage your blood sugar (glucose) level. Your blood glucose level can be greatly affected by what you eat. Eating healthier foods in the appropriate amounts throughout  the day at about the same time each day will help you control your blood glucose level. It can also help slow or prevent worsening of your diabetes mellitus. Healthy eating may even help you improve the level of your blood pressure and reach or maintain a healthy weight.  General recommendations for healthful eating and cooking habits include:  · Eating meals and snacks regularly. Avoid going long periods of time without eating to lose weight.  · Eating a diet that consists mainly of plant-based foods, such as fruits, vegetables, nuts, legumes, and whole grains.  · Using low-heat cooking methods, such as baking, instead of high-heat cooking methods, such as deep frying.  Work with your dietitian to make sure you understand how to use the Nutrition Facts information on food labels.  How can food affect me?  Carbohydrates   Carbohydrates affect your blood glucose level more than any other type of food. Your dietitian will help you determine how many carbohydrates to eat at each meal and teach you how to count carbohydrates. Counting carbohydrates is important to keep your blood glucose at a healthy level, especially if you are using insulin or taking certain medicines for diabetes mellitus.  Alcohol   Alcohol can cause sudden decreases in blood glucose (hypoglycemia), especially if you use insulin or take certain medicines for diabetes mellitus. Hypoglycemia can be a life-threatening condition. Symptoms of hypoglycemia (sleepiness, dizziness, and disorientation) are similar to symptoms of having too much alcohol.  If your health care provider has given you approval to drink alcohol, do so in moderation and use the following guidelines:  · Women should not have more than one drink per day, and men should not have more than two drinks per day. One drink is equal to:  ¨ 12 oz of beer.  ¨ 5 oz of wine.  ¨ 1½ oz of hard liquor.  · Do not drink on an empty stomach.  · Keep yourself hydrated. Have water, diet soda, or  unsweetened iced tea.  · Regular soda, juice, and other mixers might contain a lot of carbohydrates and should be counted.  What foods are not recommended?  As you make food choices, it is important to remember that all foods are not the same. Some foods have fewer nutrients per serving than other foods, even though they might have the same number of calories or carbohydrates. It is difficult to get your body what it needs when you eat foods with fewer nutrients. Examples of foods that you should avoid that are high in calories and carbohydrates but low in nutrients include:  · Trans fats (most processed foods list trans fats on the Nutrition Facts label).  · Regular soda.  · Juice.  · Candy.  · Sweets, such as cake, pie, doughnuts, and cookies.  · Fried foods.  What foods can I eat?  Eat nutrient-rich foods, which will nourish your body and keep you healthy. The food you should eat also will depend on several factors, including:  · The calories you need.  · The medicines you take.  · Your weight.  · Your blood glucose level.  · Your blood pressure level.  · Your cholesterol level.  You should eat a variety of foods, including:  · Protein.  ¨ Lean cuts of meat.  ¨ Proteins low in saturated fats, such as fish, egg whites, and beans. Avoid processed meats.  · Fruits and vegetables.  ¨ Fruits and vegetables that may help control blood glucose levels, such as apples, mangoes, and yams.  · Dairy products.  ¨ Choose fat-free or low-fat dairy products, such as milk, yogurt, and cheese.  · Grains, bread, pasta, and rice.  ¨ Choose whole grain products, such as multigrain bread, whole oats, and brown rice. These foods may help control blood pressure.  · Fats.  ¨ Foods containing healthful fats, such as nuts, avocado, olive oil, canola oil, and fish.  Does everyone with diabetes mellitus have the same meal plan?  Because every person with diabetes mellitus is different, there is not one meal plan that works for everyone. It  is very important that you meet with a dietitian who will help you create a meal plan that is just right for you.  This information is not intended to replace advice given to you by your health care provider. Make sure you discuss any questions you have with your health care provider.  Document Released: 09/14/2006 Document Revised: 05/25/2017 Document Reviewed: 11/14/2014  [employer] Interactive Patient Education © 2017 Elsevier Inc.      Hypertension  Hypertension, commonly called high blood pressure, is when the force of blood pumping through your arteries is too strong. Your arteries are the blood vessels that carry blood from your heart throughout your body. A blood pressure reading consists of a higher number over a lower number, such as 110/72. The higher number (systolic) is the pressure inside your arteries when your heart pumps. The lower number (diastolic) is the pressure inside your arteries when your heart relaxes. Ideally you want your blood pressure below 120/80.  Hypertension forces your heart to work harder to pump blood. Your arteries may become narrow or stiff. Having untreated or uncontrolled hypertension can cause heart attack, stroke, kidney disease, and other problems.  What increases the risk?  Some risk factors for high blood pressure are controllable. Others are not.  Risk factors you cannot control include:  · Race. You may be at higher risk if you are .  · Age. Risk increases with age.  · Gender. Men are at higher risk than women before age 45 years. After age 65, women are at higher risk than men.  Risk factors you can control include:  · Not getting enough exercise or physical activity.  · Being overweight.  · Getting too much fat, sugar, calories, or salt in your diet.  · Drinking too much alcohol.  What are the signs or symptoms?  Hypertension does not usually cause signs or symptoms. Extremely high blood pressure (hypertensive crisis) may cause headache, anxiety,  shortness of breath, and nosebleed.  How is this diagnosed?  To check if you have hypertension, your health care provider will measure your blood pressure while you are seated, with your arm held at the level of your heart. It should be measured at least twice using the same arm. Certain conditions can cause a difference in blood pressure between your right and left arms. A blood pressure reading that is higher than normal on one occasion does not mean that you need treatment. If it is not clear whether you have high blood pressure, you may be asked to return on a different day to have your blood pressure checked again. Or, you may be asked to monitor your blood pressure at home for 1 or more weeks.  How is this treated?  Treating high blood pressure includes making lifestyle changes and possibly taking medicine. Living a healthy lifestyle can help lower high blood pressure. You may need to change some of your habits.  Lifestyle changes may include:  · Following the DASH diet. This diet is high in fruits, vegetables, and whole grains. It is low in salt, red meat, and added sugars.  · Keep your sodium intake below 2,300 mg per day.  · Getting at least 30-45 minutes of aerobic exercise at least 4 times per week.  · Losing weight if necessary.  · Not smoking.  · Limiting alcoholic beverages.  · Learning ways to reduce stress.  Your health care provider may prescribe medicine if lifestyle changes are not enough to get your blood pressure under control, and if one of the following is true:  · You are 18-59 years of age and your systolic blood pressure is above 140.  · You are 60 years of age or older, and your systolic blood pressure is above 150.  · Your diastolic blood pressure is above 90.  · You have diabetes, and your systolic blood pressure is over 140 or your diastolic blood pressure is over 90.  · You have kidney disease and your blood pressure is above 140/90.  · You have heart disease and your blood pressure is  above 140/90.  Your personal target blood pressure may vary depending on your medical conditions, your age, and other factors.  Follow these instructions at home:  · Have your blood pressure rechecked as directed by your health care provider.  · Take medicines only as directed by your health care provider. Follow the directions carefully. Blood pressure medicines must be taken as prescribed. The medicine does not work as well when you skip doses. Skipping doses also puts you at risk for problems.  · Do not smoke.  · Monitor your blood pressure at home as directed by your health care provider.  Contact a health care provider if:  · You think you are having a reaction to medicines taken.  · You have recurrent headaches or feel dizzy.  · You have swelling in your ankles.  · You have trouble with your vision.  Get help right away if:  · You develop a severe headache or confusion.  · You have unusual weakness, numbness, or feel faint.  · You have severe chest or abdominal pain.  · You vomit repeatedly.  · You have trouble breathing.  This information is not intended to replace advice given to you by your health care provider. Make sure you discuss any questions you have with your health care provider.  Document Released: 12/18/2006 Document Revised: 05/25/2017 Document Reviewed: 10/10/2014  [employer] Interactive Patient Education © 2017 [employer]      Follow a low-salt low sugar diet start exercising daily by walking.  Good job on stopping smoking and continue that.   get your blood pressure medications filled and take them regularly.  Check your blood pressure daily log it so that you can show your primary care physician and see them.  Call 1st thing Monday morning to schedule an appointment with her primary care physician.  And plan right yogurt, use Monistat intravaginally and externally for your yeast infection for the next 7 days.

## 2018-08-27 ENCOUNTER — APPOINTMENT (OUTPATIENT)
Dept: VASCULAR LAB | Facility: MEDICAL CENTER | Age: 38
End: 2018-08-27
Payer: COMMERCIAL

## 2018-10-17 ENCOUNTER — TELEPHONE (OUTPATIENT)
Dept: VASCULAR LAB | Facility: MEDICAL CENTER | Age: 38
End: 2018-10-17

## 2018-10-17 NOTE — TELEPHONE ENCOUNTER
Called patient to schedule initial vascular appointment with Dr. Blackwell. LM instructing patient to call back when available to schedule.    Jerson Cruz. [facility]'  [place] [facility]

## 2018-10-24 ENCOUNTER — TELEPHONE (OUTPATIENT)
Dept: VASCULAR LAB | Facility: MEDICAL CENTER | Age: 38
End: 2018-10-24

## 2018-10-24 NOTE — TELEPHONE ENCOUNTER
Called patient to schedule initial vascular appointment with Dr. Blackwell. LM instructing patient to call back when available to schedule.    Jerson Cruz. [facility]'  [place] [facility]

## 2018-10-30 ENCOUNTER — TELEPHONE (OUTPATIENT)
Dept: VASCULAR LAB | Facility: MEDICAL CENTER | Age: 38
End: 2018-10-30

## 2018-10-30 NOTE — TELEPHONE ENCOUNTER
Called patient to schedule initial vascular appointment with Dr. Blackwell. LM instructing patient to call back when available to schedule.    Jerson Cruz. [facility]'  [facility]

## 2018-10-30 NOTE — TELEPHONE ENCOUNTER
Patient called back regarding new patient appt, patient will have to call back at a later time to schedule when she is able to take time off work.

## 2019-02-21 ENCOUNTER — OFFICE VISIT (OUTPATIENT)
Dept: VASCULAR LAB | Facility: MEDICAL CENTER | Age: 39
End: 2019-02-21
Attending: INTERNAL MEDICINE
Payer: COMMERCIAL

## 2019-02-21 VITALS
SYSTOLIC BLOOD PRESSURE: 129 MMHG | HEART RATE: 127 BPM | BODY MASS INDEX: 40.02 KG/M2 | WEIGHT: 255 LBS | HEIGHT: 67 IN | DIASTOLIC BLOOD PRESSURE: 86 MMHG

## 2019-02-21 DIAGNOSIS — E11.9 TYPE 2 DIABETES MELLITUS WITHOUT COMPLICATION, WITHOUT LONG-TERM CURRENT USE OF INSULIN (HCC): ICD-10-CM

## 2019-02-21 DIAGNOSIS — E78.5 DYSLIPIDEMIA: ICD-10-CM

## 2019-02-21 DIAGNOSIS — R06.09 DOE (DYSPNEA ON EXERTION): ICD-10-CM

## 2019-02-21 DIAGNOSIS — R07.89 OTHER CHEST PAIN: ICD-10-CM

## 2019-02-21 DIAGNOSIS — I10 ESSENTIAL HYPERTENSION: ICD-10-CM

## 2019-02-21 PROCEDURE — 99212 OFFICE O/P EST SF 10 MIN: CPT

## 2019-02-21 RX ORDER — LOSARTAN POTASSIUM AND HYDROCHLOROTHIAZIDE 25; 100 MG/1; MG/1
1 TABLET ORAL DAILY
Qty: 30 TAB | Refills: 11
Start: 2019-02-21 | End: 2020-03-18 | Stop reason: SDUPTHER

## 2019-02-21 RX ORDER — ATORVASTATIN CALCIUM 20 MG/1
20 TABLET, FILM COATED ORAL NIGHTLY
COMMUNITY
End: 2019-12-27

## 2019-02-21 RX ORDER — GLIMEPIRIDE 2 MG/1
2 TABLET ORAL EVERY MORNING
COMMUNITY
End: 2019-12-27

## 2019-02-21 RX ORDER — BUPROPION HYDROCHLORIDE 100 MG/1
150 TABLET ORAL 2 TIMES DAILY
COMMUNITY
End: 2021-07-19

## 2019-02-21 RX ORDER — FENOFIBRATE 145 MG/1
145 TABLET, COATED ORAL DAILY
COMMUNITY
End: 2020-03-18 | Stop reason: SDUPTHER

## 2019-02-21 ASSESSMENT — ENCOUNTER SYMPTOMS
LOSS OF CONSCIOUSNESS: 0
FOCAL WEAKNESS: 0
SHORTNESS OF BREATH: 0
HEADACHES: 0
COUGH: 0
WEIGHT LOSS: 0
BLOOD IN STOOL: 0
ABDOMINAL PAIN: 1
SPEECH CHANGE: 0
SENSORY CHANGE: 0
DOUBLE VISION: 0
MYALGIAS: 0
DIZZINESS: 0
NERVOUS/ANXIOUS: 1
BLURRED VISION: 0
DEPRESSION: 0
BACK PAIN: 1

## 2019-02-21 NOTE — PROGRESS NOTES
[facility] - Initial Visit  Date of Service: 02/21/19    Gladys Lombardi has been referred for evaluation and management of dyslipidemia    Referral Source: PCP    HPI  History of ASCVD: No  Age at Initial Diagnosis: Oct 2018 - high triglycerides    Current Prescription Lipid Lowering Medications - including dose:   Statin: Atorvastatin  Non-Statin: Fenofibrate  Current Lipid Lowering and Related Supplements:   None  Any Current Side Effects Potentially Related to Lipid Lowering therapy?   No  Current Adherence to Lipid Lowering Therapies   Complete  Previously Attempted Interventions for Lipids - including outcome  Statin: None    Outcome: N/A  Non-Statin: None   Outcome: N/A  Any Previous History of Statin Intolerance?   No  Baseline Lipids Prior to Treatment:   Trigs >2000  Other Pertinent History:   No pancreatitis  No known thyroid disease  No liver or kidney issues  No pregnancies or kids  Not currently sexual active  She does have a history of intermittent chest pain.  She says it is worse with exercise.  She also complains of dyspnea on exertion and says this is gotten worse over time.  She is concerned it could be her heart  History of other CV risk factors:   -BP - on losartan HCT; usually 130s/80s  -DM - diagnosed in past year; on glimiperide and januvia; metformin led to a lot of GI issues; never been in dm class or dietician    PAST MEDICAL HISTORY:  has a past medical history of Anxiety; Asthma; Diabetes (HCC); Dyslipidemia; and Hypertension.     PAST SURGICAL HISTORY:  has no past surgical history on file.     CURRENT MEDICATIONS:   Current Outpatient Prescriptions:   •  atorvastatin, 20 mg, Oral, Nightly, Taking  •  SITagliptin, 100 mg, Oral, DAILY, Taking  •  fenofibrate, 145 mg, Oral, DAILY, Taking  •  buPROPion, 150 mg, Oral, BID, Taking  •  glimepiride, 2 mg, Oral, QAM, Taking  •  losartan-hydrochlorothiazide, 1 Tab, Oral, DAILY  •  PROAIR HFA, 1-2 Puff, Inhalation, Q6HRS PRN, PRN  •   OMEPRAZOLE PO, Take  by mouth., Taking  •  NUVARING, USE VAGINALLY AS DIRECTED BY MD, Not Taking     ALLERGIES: Patient has no known allergies.     FAMILY History  Dad - mi in early 60s  Mom - valvular heart disease    SOCIAL HISTORY   History   Smoking Status   • Current Some Day Smoker   Smokeless Tobacco   • Never Used   Had quit smoking, but now back to smoking a few a week    Change in weight: up and down  Exercise habits: limited by back pain; starting with stretching and yoga  Diet: low carbohydrate, diabetic    Review of Systems   Constitutional: Negative for malaise/fatigue and weight loss.   HENT: Negative for hearing loss and tinnitus.    Eyes: Negative for blurred vision and double vision.   Respiratory: Negative for cough and shortness of breath.    Cardiovascular: Positive for chest pain. Negative for leg swelling.   Gastrointestinal: Positive for abdominal pain. Negative for blood in stool.   Genitourinary: Negative.    Musculoskeletal: Positive for back pain and joint pain. Negative for myalgias.   Skin: Negative for itching and rash.   Neurological: Negative for dizziness, sensory change, speech change, focal weakness, loss of consciousness and headaches.   Psychiatric/Behavioral: Negative for depression. The patient is nervous/anxious.      Physical Exam   Constitutional: She is oriented to person, place, and time. She appears well-developed and well-nourished. No distress.   HENT:   Head: Normocephalic and atraumatic.   Eyes: Pupils are equal, round, and reactive to light. Conjunctivae and EOM are normal. No scleral icterus.   Neck: Normal range of motion. Neck supple. No JVD present. No thyromegaly present.   Cardiovascular: Normal rate, regular rhythm, normal heart sounds and intact distal pulses.  Exam reveals no gallop and no friction rub.    No murmur heard.  Pulmonary/Chest: Effort normal and breath sounds normal. No respiratory distress. She has no wheezes. She has no rales. She exhibits no  tenderness.   Abdominal: Soft. Bowel sounds are normal. She exhibits no distension and no mass. There is no tenderness. There is no rebound and no guarding.   Musculoskeletal: Normal range of motion. She exhibits no edema or tenderness.   Neurological: She is alert and oriented to person, place, and time. She has normal reflexes. No cranial nerve deficit. Coordination normal.   Skin: Skin is warm and dry. No rash noted. She is not diaphoretic. No erythema. No pallor.   Psychiatric: She has a normal mood and affect.   Vitals reviewed.      DATA REVIEW:    Other Pertinent Blood Work:   Lab Results   Component Value Date    SODIUM 133 (L) 04/14/2018    POTASSIUM 3.6 04/14/2018    CHLORIDE 102 04/14/2018    CO2 21 04/14/2018    ANION 10.0 04/14/2018    GLUCOSE 189 (H) 04/14/2018    BUN 15 04/14/2018    CREATININE 0.69 04/14/2018    CALCIUM 8.7 04/14/2018    ASTSGOT 19 04/14/2018    ALTSGPT 27 04/14/2018    ALKPHOSPHAT 74 04/14/2018    TBILIRUBIN 0.5 04/14/2018    ALBUMIN 4.3 04/14/2018    AGRATIO 1.4 04/14/2018    TSHULTRASEN 2.060 04/14/2018     Blood work from October 2018  A1c 8.4  GFR 99  Albumin creatinine ratio in the urine normal  Total cholesterol 360  Triglycerides 2164  HDL 21    ASSESSMENT AND PLAN  Patient Type, check all that apply:   Primary Prevention with 5/5 components of the metabolic syndrome  Established Atherosclerotic Cardiovascular Disease (ASCVD)  Not unknown, but patient does have symptoms consistent with possible angina and does have a family history of coronary artery disease  -We will obtain stress echocardiogram  Other Established (non-atherosclerotic) Vascular Disease, if Present:  None  Evidence of Heterozygous Familial Hypercholesterolemia (FH):   No  ACC/AHA Indication for Statin Therapy, stefano all that apply:  Diabetes aged 40-75: Indication for Moderate intensity statin -actually a bit below the age range, but statin therapy seems appropriate  Calculated Risk for ASCVD, if applicable     N/A  Other Significant Risk Markers, if any, stefano all that apply   Family history of premature ASCVD in first degree relative  National Lipid Association (NLA) Goal  LDL-C:   <100 mg/dL and non-HDL less than 130  Lifestyle Recommendations From Today’s Visit:    Eating Plan: Concentrate on  Low simple carb   Some exercise every day  Slow steady weight loss  Statin Therapy Recommendations from Today’s Visit:   -Continue atorvastatin 20 mg daily for now.  Consider dose increase if LDL remains above goal  Non-Statin Medications Recommendations from Today’s Visit:   -Continue fenofibrate  -Consider addition of prescription strength omega-3's if triglycerides remain greater than 500 or if non-HDL above goal but LDL at goal  Indication for PCSK9 Inhibitor, if applicable:  Not currently indicated  Supplements Recommended at this visit:   -Start fish oil at least 2 g daily  -Start vitamin D at least 2 g daily  Recommendations for Other Cardiovascular Risk Factors, stefano all that apply:   -Blood pressure -ACC AHA goal less than 130/80.  above goal in the office.  Continue current dose of losartan HCT.  Start home blood pressure monitoring.  Sooner intensification of therapy if above goal  -Diabetes mellitus-A1c goal less than 7 A1c previously not optimally controlled.  Did not tolerate metformin due to GI issues.  Continue glimepiride and Januvia for now.  In the future would consider either replacing the glimepiride with pioglitazone or adding pioglitazone which would have less risk of hypoglycemia and a better effect on her triglycerides.  She is not interested in diabetes education at the present time and as such I think we should hold off on fingersticks.    Studies Ordered at Todays Visit: Stress echo  Blood Work Ordered At Today’s visit: CMP, fasting lipid panel, direct LDL, TSH, urine for albumin, A1c prior to next visit  Follow-Up: 6 weeks    Michael J Bloch, M.D.    CC:  Dr Akbar

## 2019-02-21 NOTE — PATIENT INSTRUCTIONS
CHOLETEROL/TRIGLYCERIDES  - continue atorvastatin  - continue fenofibrate  - start fish oil or other omega 3 - 2000 mg daily at least  - start vit D 2000 IU daily (gelcaps)    BLOOD PRESSURE  - continue losartan 100/25 mg daily  - start monitoring BPat home    DIABETES  - continue januvia  - continue glimiperide    Low Simple Carbohydrate Diet  Exercise every day  Quit smoking as we discussed  Nicotine gum or lozenge ok    Blood work 7-10 days before follow up visit    Call [phone] to set up stress echo    Follow Up: April 18 at 320p    Michael Bloch, MD  [facility]  [phone]

## 2019-04-18 ENCOUNTER — APPOINTMENT (OUTPATIENT)
Dept: VASCULAR LAB | Facility: MEDICAL CENTER | Age: 39
End: 2019-04-18
Payer: COMMERCIAL

## 2019-06-11 ENCOUNTER — APPOINTMENT (OUTPATIENT)
Dept: CARDIOLOGY | Facility: MEDICAL CENTER | Age: 39
End: 2019-06-11
Attending: INTERNAL MEDICINE
Payer: COMMERCIAL

## 2019-08-27 ENCOUNTER — HOSPITAL ENCOUNTER (OUTPATIENT)
Dept: CARDIOLOGY | Facility: MEDICAL CENTER | Age: 39
End: 2019-08-27
Attending: INTERNAL MEDICINE
Payer: COMMERCIAL

## 2019-08-27 DIAGNOSIS — R06.09 DOE (DYSPNEA ON EXERTION): ICD-10-CM

## 2019-08-27 DIAGNOSIS — R07.89 OTHER CHEST PAIN: ICD-10-CM

## 2019-08-27 LAB — LV EJECT FRACT  99904: 60

## 2019-08-27 PROCEDURE — 93017 CV STRESS TEST TRACING ONLY: CPT

## 2019-08-27 PROCEDURE — 93350 STRESS TTE ONLY: CPT | Mod: 26 | Performed by: INTERNAL MEDICINE

## 2019-08-27 PROCEDURE — 93018 CV STRESS TEST I&R ONLY: CPT | Performed by: INTERNAL MEDICINE

## 2019-08-28 ENCOUNTER — TELEPHONE (OUTPATIENT)
Dept: VASCULAR LAB | Facility: MEDICAL CENTER | Age: 39
End: 2019-08-28

## 2019-08-28 NOTE — TELEPHONE ENCOUNTER
Called patient to inform that Echo was normal and to schedule follow-up in vascular. Was unable to reach patient or LM. When calling number in chart. There was no ring and only static on the other end. Waited for 2 minutes before hanging up. Will try again at a later time.    Greg Puga, Med. Ass't  [facility]

## 2019-11-23 LAB
ALBUMIN SERPL-MCNC: 4.1 G/DL (ref 3.5–5.5)
ALBUMIN/CREAT UR: 11.5 MG/G CREAT (ref 0–30)
ALBUMIN/GLOB SERPL: 1.6 {RATIO} (ref 1.2–2.2)
ALP SERPL-CCNC: 73 IU/L (ref 39–117)
ALT SERPL-CCNC: 12 IU/L (ref 0–32)
AST SERPL-CCNC: 10 IU/L (ref 0–40)
BILIRUB SERPL-MCNC: 0.3 MG/DL (ref 0–1.2)
BUN SERPL-MCNC: 11 MG/DL (ref 6–20)
BUN/CREAT SERPL: 14 (ref 9–23)
CALCIUM SERPL-MCNC: 9.5 MG/DL (ref 8.7–10.2)
CHLORIDE SERPL-SCNC: 100 MMOL/L (ref 96–106)
CHOLEST SERPL-MCNC: 256 MG/DL (ref 100–199)
CO2 SERPL-SCNC: 17 MMOL/L (ref 20–29)
CREAT SERPL-MCNC: 0.79 MG/DL (ref 0.57–1)
CREAT UR-MCNC: 156.4 MG/DL
GLOBULIN SER CALC-MCNC: 2.6 G/DL (ref 1.5–4.5)
GLUCOSE SERPL-MCNC: 227 MG/DL (ref 65–99)
HBA1C MFR BLD: 9.9 % (ref 4.8–5.6)
HDLC SERPL-MCNC: 33 MG/DL
LABORATORY COMMENT REPORT: ABNORMAL
LDLC SERPL CALC-MCNC: ABNORMAL MG/DL (ref 0–99)
LDLC SERPL DIRECT ASSAY-MCNC: 115 MG/DL (ref 0–99)
MICROALBUMIN UR-MCNC: 18 UG/ML
POTASSIUM SERPL-SCNC: 4.5 MMOL/L (ref 3.5–5.2)
PROT SERPL-MCNC: 6.7 G/DL (ref 6–8.5)
SODIUM SERPL-SCNC: 133 MMOL/L (ref 134–144)
TRIGL SERPL-MCNC: 670 MG/DL (ref 0–149)
TSH SERPL DL<=0.005 MIU/L-ACNC: 1.56 UIU/ML (ref 0.45–4.5)
VLDLC SERPL CALC-MCNC: ABNORMAL MG/DL (ref 5–40)

## 2019-11-25 ENCOUNTER — TELEPHONE (OUTPATIENT)
Dept: VASCULAR LAB | Facility: MEDICAL CENTER | Age: 39
End: 2019-11-25

## 2019-11-25 NOTE — TELEPHONE ENCOUNTER
Left message for patient to call back and schedule follow up family lipid clinic. Labs scanned in media

## 2019-12-06 ENCOUNTER — TELEPHONE (OUTPATIENT)
Dept: VASCULAR LAB | Facility: MEDICAL CENTER | Age: 39
End: 2019-12-06

## 2019-12-27 ENCOUNTER — OFFICE VISIT (OUTPATIENT)
Dept: VASCULAR LAB | Facility: MEDICAL CENTER | Age: 39
End: 2019-12-27
Attending: FAMILY MEDICINE
Payer: COMMERCIAL

## 2019-12-27 VITALS
SYSTOLIC BLOOD PRESSURE: 137 MMHG | BODY MASS INDEX: 36.22 KG/M2 | HEART RATE: 95 BPM | WEIGHT: 230.8 LBS | DIASTOLIC BLOOD PRESSURE: 89 MMHG | HEIGHT: 67 IN

## 2019-12-27 DIAGNOSIS — E88.810 METABOLIC SYNDROME: ICD-10-CM

## 2019-12-27 DIAGNOSIS — E11.65 UNCONTROLLED TYPE 2 DIABETES MELLITUS WITH HYPERGLYCEMIA (HCC): ICD-10-CM

## 2019-12-27 DIAGNOSIS — E78.1 HYPERTRIGLYCERIDEMIA: ICD-10-CM

## 2019-12-27 DIAGNOSIS — I10 ESSENTIAL HYPERTENSION: ICD-10-CM

## 2019-12-27 DIAGNOSIS — E78.5 DYSLIPIDEMIA: ICD-10-CM

## 2019-12-27 PROCEDURE — 99212 OFFICE O/P EST SF 10 MIN: CPT | Performed by: FAMILY MEDICINE

## 2019-12-27 PROCEDURE — 99214 OFFICE O/P EST MOD 30 MIN: CPT | Performed by: FAMILY MEDICINE

## 2019-12-27 RX ORDER — PIOGLITAZONEHYDROCHLORIDE 30 MG/1
30 TABLET ORAL DAILY
Qty: 90 TAB | Refills: 3 | Status: SHIPPED | OUTPATIENT
Start: 2019-12-27 | End: 2020-12-21

## 2019-12-27 RX ORDER — DAPAGLIFLOZIN 10 MG/1
TABLET, FILM COATED ORAL
COMMUNITY
End: 2020-03-18 | Stop reason: SDUPTHER

## 2019-12-27 RX ORDER — ATORVASTATIN CALCIUM 40 MG/1
40 TABLET, FILM COATED ORAL DAILY
Qty: 90 TAB | Refills: 3 | Status: SHIPPED | OUTPATIENT
Start: 2019-12-27 | End: 2020-03-18 | Stop reason: SDUPTHER

## 2019-12-27 SDOH — HEALTH STABILITY: MENTAL HEALTH: HOW OFTEN DO YOU HAVE A DRINK CONTAINING ALCOHOL?: MONTHLY OR LESS

## 2019-12-27 SDOH — HEALTH STABILITY: MENTAL HEALTH: HOW OFTEN DO YOU HAVE 6 OR MORE DRINKS ON ONE OCCASION?: MONTHLY

## 2019-12-27 SDOH — HEALTH STABILITY: MENTAL HEALTH: HOW MANY STANDARD DRINKS CONTAINING ALCOHOL DO YOU HAVE ON A TYPICAL DAY?: NOT ASKED

## 2019-12-27 ASSESSMENT — ENCOUNTER SYMPTOMS
NAUSEA: 0
MYALGIAS: 0
VOMITING: 0
SEIZURES: 0
BRUISES/BLEEDS EASILY: 0
SHORTNESS OF BREATH: 0
HEADACHES: 0
TREMORS: 0
DIARRHEA: 0
FOCAL WEAKNESS: 0
ABDOMINAL PAIN: 0
PALPITATIONS: 0
CHILLS: 0
HEMOPTYSIS: 0
DIZZINESS: 0
COUGH: 0
FEVER: 0
WEAKNESS: 0
WHEEZING: 0

## 2019-12-27 NOTE — PROGRESS NOTES
[facility] - Follow-Up   Date of Service: 12/26/19    Gladys Lombardi has been referred for evaluation and management of dyslipidemia    Referral Source: PCP    HPI    HTN:  Current HTN concerns: No specific concerns since last visit   ADRs: No  Recent studies/labs completed (reviewed with patient, noted below): yes  HTN sx:  No current blurred or changed vision, chest pain, shortness of breath, headache, nausea, dizziness/vertigo   Home BP log: sporadic checks, 120s/90s  24h ABPM completed: not tested   Adherence to current HTN meds: compliant all of the time     Anticoagulation/antiplats:   No    T2D:  No current symptoms reported.   Tolerating meds and no recent med changes.   Home blood sugars stable, reports no lows.   Last A1c 9.9    HLD:   Denies current concerns or symptoms.     History of ASCVD: No  Age at Initial Diagnosis: Oct 2018 - high triglycerides    Current Prescription Lipid Lowering Medications - including dose:   Statin: Atorvastatin  Non-Statin: Fenofibrate  Current Lipid Lowering and Related Supplements:   None  Any Current Side Effects Potentially Related to Lipid Lowering therapy?   No  Current Adherence to Lipid Lowering Therapies   Complete  Previously Attempted Interventions for Lipids - including outcome  Statin: None    Outcome: N/A  Non-Statin: None   Outcome: N/A  Any Previous History of Statin Intolerance?   No  Baseline Lipids Prior to Treatment:   Trigs >2000  Other Pertinent History:   No pancreatitis  No known thyroid disease  No liver or kidney issues  No pregnancies or kids  Not currently sexual active  She does have a history of intermittent chest pain.  She says it is worse with exercise.  She also complains of dyspnea on exertion and says this is gotten worse over time.  She is concerned it could be her heart    CURRENT MEDICATIONS:   Current Outpatient Medications:   •  Dapagliflozin Propanediol, Take  by mouth., Taking  •  ALBUTEROL INH, Inhale  by mouth.,  "Taking  •  pioglitazone, 30 mg, Oral, DAILY  •  atorvastatin, 40 mg, Oral, DAILY  •  SITagliptin, 100 mg, Oral, DAILY, Taking  •  fenofibrate, 145 mg, Oral, DAILY, Taking  •  buPROPion, 150 mg, Oral, BID, Taking  •  losartan-hydrochlorothiazide, 1 Tab, Oral, DAILY, Taking  •  OMEPRAZOLE PO, Take  by mouth., Taking  •  NUVARING, USE VAGINALLY AS DIRECTED BY MD, Not Taking     ALLERGIES: Patient has no known allergies.     SOCIAL HISTORY   Social History     Tobacco Use   Smoking Status Current Some Day Smoker   • Packs/day: 0.00   Smokeless Tobacco Never Used   Had quit smoking, but now back to smoking a few a week    Change in weight: decreased   BMI Readings from Last 5 Encounters:   12/27/19 36.15 kg/m²   02/21/19 40.02 kg/m²   04/14/18 41.12 kg/m²   03/30/17 39.00 kg/m²   12/18/16 39.54 kg/m²      Exercise habits: limited by back pain; starting with stretching and yoga  Diet: low carbohydrate, diabetic    Review of Systems   Constitutional: Negative for chills, fever and malaise/fatigue.   Respiratory: Negative for cough, hemoptysis, shortness of breath and wheezing.    Cardiovascular: Negative for chest pain, palpitations and leg swelling.   Gastrointestinal: Negative for abdominal pain, diarrhea, nausea and vomiting.   Musculoskeletal: Negative for joint pain and myalgias.   Skin: Negative for itching and rash.   Neurological: Negative for dizziness, tremors, focal weakness, seizures, weakness and headaches.   Endo/Heme/Allergies: Does not bruise/bleed easily.     Vitals:    12/27/19 1028 12/27/19 1037   BP: 131/92 137/89   BP Location: Left arm Left arm   Patient Position: Sitting Sitting   BP Cuff Size: Adult Adult   Pulse: 97 95   Weight: 104.7 kg (230 lb 12.8 oz)    Height: 1.702 m (5' 7\")      BP Readings from Last 5 Encounters:   12/27/19 137/89   02/21/19 129/86   04/14/18 (!) 165/103   03/30/17 124/78   03/10/17 126/84      Physical Exam   Constitutional: She is oriented to person, place, and time. She " appears well-developed and well-nourished. No distress.   HENT:   Head: Normocephalic and atraumatic.   Eyes: Pupils are equal, round, and reactive to light. Conjunctivae and EOM are normal. No scleral icterus.   Neck: Normal range of motion. Neck supple. No JVD present. No thyromegaly present.   Cardiovascular: Normal rate, regular rhythm, normal heart sounds and intact distal pulses. Exam reveals no gallop and no friction rub.   No murmur heard.  Pulmonary/Chest: Effort normal and breath sounds normal. No respiratory distress. She has no wheezes. She has no rales. She exhibits no tenderness.   Abdominal: Soft. Bowel sounds are normal. She exhibits no distension and no mass. There is no tenderness. There is no rebound and no guarding.   Musculoskeletal: Normal range of motion.         General: No tenderness or edema.   Neurological: She is alert and oriented to person, place, and time. She has normal reflexes. No cranial nerve deficit. Coordination normal.   Skin: Skin is warm and dry. No rash noted. She is not diaphoretic. No erythema. No pallor.   Psychiatric: She has a normal mood and affect.   Vitals reviewed.      DATA REVIEW:    Other Pertinent Blood Work:   Lab Results   Component Value Date    SODIUM 133 (L) 11/22/2019    SODIUM 133 (L) 04/14/2018    POTASSIUM 4.5 11/22/2019    POTASSIUM 3.6 04/14/2018    CHLORIDE 100 11/22/2019    CHLORIDE 102 04/14/2018    CO2 17 (L) 11/22/2019    CO2 21 04/14/2018    ANION 10.0 04/14/2018    GLUCOSE 227 (H) 11/22/2019    GLUCOSE 189 (H) 04/14/2018    BUN 11 11/22/2019    BUN 15 04/14/2018    CREATININE 0.79 11/22/2019    CREATININE 0.69 04/14/2018    CALCIUM 9.5 11/22/2019    CALCIUM 8.7 04/14/2018    ASTSGOT 10 11/22/2019    ASTSGOT 19 04/14/2018    ALTSGPT 12 11/22/2019    ALTSGPT 27 04/14/2018    ALKPHOSPHAT 73 11/22/2019    ALKPHOSPHAT 74 04/14/2018    TBILIRUBIN 0.3 11/22/2019    TBILIRUBIN 0.5 04/14/2018    ALBUMIN 4.1 11/22/2019    ALBUMIN 4.3 04/14/2018     AGRATIO 1.6 2019    AGRATIO 1.4 2018    TSHULTRASEN 2.060 2018     Blood work from 2018  A1c 8.4  GFR 99  Albumin creatinine ratio in the urine normal  Total cholesterol 360  Triglycerides 2164  HDL 21    Lab Results   Component Value Date/Time    CHOLSTRLTOT 256 (H) 2019 08:51 AM    LDL Comment 2019 08:51 AM    HDL 33 (L) 2019 08:51 AM    TRIGLYCERIDE 670 (HH) 2019 08:51 AM     Results for MARTIN CORTEZ (MRN [id number]) as of 2019 13:14   Ref. Range 2019 08:51   Glycohemoglobin Latest Ref Range: 4.8 - 5.6 % 9.9 (H)         VASCULAR IMAGING:     Last EKG:   Results for orders placed or performed during the hospital encounter of 18   EKG (NOW)   Result Value Ref Range    Report       [facility]    Test Date:  2018  Pt Name:    MARTIN CORTEZ             Department: [facility]  MRN:        [id number]                      Room:       -ROOM 5  Gender:     Female                       Technician: HRS  :        1980                   Requested By:MIKE SANTIAGO  Order #:    [id number]                    Reading MD:    Measurements  Intervals                                Axis  Rate:       99                           P:          62  RI:         132                          QRS:        33  QRSD:       86                           T:          18  QT:         341  QTc:        438    Interpretive Statements  Sinus rhythm  Probable left atrial enlargement  Baseline wander in lead(s) II,V6  No previous ECG available for comparison       Stress echo 19  Good functional capacity.  negative stress echo.    1. Essential hypertension     2. Dyslipidemia  APOLIPOPROTEIN B   3. Uncontrolled type 2 diabetes mellitus with hyperglycemia (HCC)  Lipid Profile    Comp Metabolic Panel    APOLIPOPROTEIN B    LDL, DIRECT    HEMOGLOBIN A1C   4. Hypertriglyceridemia  APOLIPOPROTEIN B    LDL, DIRECT   5. Metabolic syndrome      6. BMI 36.0-36.9,adult       ASSESSMENT AND PLAN  Patient Type, check all that apply:   Primary Prevention, Type 2 Diabetes Mellitus and Metabolic Syndrome     Established Atherosclerotic Cardiovascular Disease (ASCVD):  None     Other Established (non-atherosclerotic) Vascular Disease, if Present:  None    Evidence of Heterozygous Familial Hypercholesterolemia (FH):   No     ACC/AHA Indication for Statin Therapy, stefano all that apply:  Diabetes aged 40-75: Indication for High intensity statin      Calculated Risk for ASCVD, if applicable    N/A     Other Significant Risk Markers, if any, stefano all that apply   Family history of premature ASCVD in first degree relative and Other: severe elevated triglycerides      1) Elevated trigs - severely elevated, high risk for pancreatitis   - reduce etoh, weight, fried/fatty foods   - continue atorva, fenofibrate 145mg daily  - start pioglitazone 30mg daily   - will add Vascepa 2g BID as next step     National Lipid Association (NLA) Goal  LDL-C:   <100 mg/dL and non-HDL less than 130, not at goal   Trigs <150  Low HDL    Lifestyle Recommendations From Today’s Visit:    Eating Plan: Concentrate on  Low simple carb   Some exercise every day  Slow steady weight loss    Statin Therapy Recommendations from Today’s Visit:   - increase atorvastatin to 40mg daily     Non-Statin Medications Recommendations from Today’s Visit:   -Continue fenofibrate 145mg daily   - started pioglitazone 30mg daily   -Consider addition of prescription strength omega-3's if triglycerides remain greater than 500 or if non-HDL above goal but LDL at goal    Indication for PCSK9 Inhibitor, if applicable:  Not currently indicated  Supplements Recommended at this visit:   -Start vitamin D at least 2 g daily    Recommendations for Other Cardiovascular Risk Factors, stefano all that apply:     Blood Pressure Management:Goal: ACC/AHA (2017) goal <130/80  Home BP at goal:  no  Office BP at goal:  no  Echo:  normal  ACR: normal   Device candidate? no  Plan:   Monitoring:   - order 24h ABPM:  UNDECIDED, consider if persistent white coat effect   - monitor lytes/gfr routinely   - contact office if BP consistently >140/>90 to discussion of tx adjustments   Medications:  ACEi/ARB: continue losartan 100mg (combo)  DHP-CCB: add amlodipine 5mg as next options  Thiazide: continue HCTZ 25mg (combo), consider change to chlorthalidone   Other:   Aldosterone Antagonist: add spironolactone 25mg as 4th agent   Loop Diuretic: not indicated   Peripheral Alpha Blocker: not indicated   Other CCB: not indicated   Direct Vasodilator: not indicated   Centrally Acting Alpha Agonist: not indicated   Other:   BB: not indicated   DRI: not indicated     T2D: non-insulin, uncontrolled   Did not tolerate metformin due to GI issues.   Last A1c = 9.9  Goal A1c < 7.0  ACR: normal   Plan:  - continue farxiga 10mg daily   - continue januvia 100mg daily   - stop glimepiride, start pioglitazone 30mg daily due to high BS and severe trigs   - add weekly GLP1RA as next agent   - declines diabetes education at the present time and as such I think we should hold off on fingersticks.  - recommmend for routine care with PCP (or endocrine) to include regular A1c monitoring, annual albumin/creatinine ratio (ACR), annual diabetic retinopathy screening, foot exams, annual flu vaccine, and updates to pneumonia vaccines as appropriate     Studies Ordered at Todays Visit: none   Blood Work Ordered At Today’s visit: a1c, direct LDL, apoB, CMP, lipid  Follow-Up: 2mo    Gold Blackwell M.D.

## 2019-12-27 NOTE — PATIENT INSTRUCTIONS
1) stop glimepiride, start pioglitazone 30mg daily   2) update labs in 2mo  3) we will consider trulicity or ozempic   4) increase atorvastatin to 40mg     Blood pressure and BP-lowering diet:  If you are being treated for blood pressure today: please be advised that stabilizing BP may require multiple med changes and close monitoring over the next several months and initially there may be additional imaging and labs and the possibility of adverse drug reactions. Variability of your blood pressure and heart rate may occur until we have things stabilized.  Please feel free to contact our office as needed for questions or concerns.      SODIUM RESTRICTIONS: - consume no more than 2,300mg of sodium daily (look at food labels) ,or if you have high BP then reduce to no more than 1,500mg of sodium daily   For more information visit: https://www.[facility]/contents/low-sodium-diet-the-basics/print?ktxmmClc=0080&source=see_link     DASH diet   (https://www.heart.org/en/health-topics/high-blood-pressure/changes-you-can-make-to-manage-high-blood-pressure/managing-blood-pressure-with-a-heart-healthy-diet)    - if you notice BP > 140/>90 for more than 3 days, then contact our office for further instructions    Cholesterol-reducing diets:   - AHA diet  (http://www.heart.org/en/healthy-living/healthy-eating/eat-smart/nutrition-basics/aha-diet-and-lifestyle-recommendations)   - Mediterranean diet (http://www.heart.org/en/healthy-living/healthy-eating/eat-smart/nutrition-basics/mediterranean-diet)   - lowering triglycerides: (http://my.americanheart.org/idc/groups/ahamah-public/@wc/@sop/@[facility]/documents/downloadable/[place]_425988.pdf)     Physical activity: Unless directed otherwise at today's visit or you are physically incapable due to your current health or medical conditions, it is advised per the American Heart Association to engage in moderate to vigorous physical activity such as brisk walking, swimming, cycling, >150 minutes  per week at 30-40 minutes per session, 3 to 5 times weekly.      General healthly nutrition advice:  - the USDA food pattern (https://www.cnpp.usda.gov/USDAFoodPatterns)  - plate method (https://www.choosemyplate.gov/)  - consume diet that emphasizes intake of vegetables, fruits, and whole grains,  - use low fat diary products, poultry, fish, legumes, nontropical vegetable oils, nuts  - limit intake of sweets, sugar-sweetned beverages, and red meats   - reduce saturated and trans fats to <6% of your daily calories

## 2019-12-30 ENCOUNTER — TELEPHONE (OUTPATIENT)
Dept: VASCULAR LAB | Facility: MEDICAL CENTER | Age: 39
End: 2019-12-30

## 2020-01-06 ENCOUNTER — TELEPHONE (OUTPATIENT)
Dept: VASCULAR LAB | Facility: MEDICAL CENTER | Age: 40
End: 2020-01-06

## 2020-01-06 NOTE — TELEPHONE ENCOUNTER
Patient overdue for follow-up appt with vascular care.  Medical assistant has been unable to reach and reschedule  Multiple messages have been left  Await further patient contact.  Pending further contact, will defer all vascular care, including management of cardiac vascular risk factors, to PCP    Michael J. Bloch, MD  [facility]    Cc:  JAMES Pollock

## 2020-02-25 ENCOUNTER — APPOINTMENT (OUTPATIENT)
Dept: VASCULAR LAB | Facility: MEDICAL CENTER | Age: 40
End: 2020-02-25
Payer: COMMERCIAL

## 2020-03-05 LAB
ALBUMIN SERPL-MCNC: 4.6 G/DL (ref 3.8–4.8)
ALBUMIN/GLOB SERPL: 2.2 {RATIO} (ref 1.2–2.2)
ALP SERPL-CCNC: 54 IU/L (ref 39–117)
ALT SERPL-CCNC: 15 IU/L (ref 0–32)
APO B SERPL-MCNC: 120 MG/DL
AST SERPL-CCNC: 13 IU/L (ref 0–40)
BILIRUB SERPL-MCNC: 0.3 MG/DL (ref 0–1.2)
BUN SERPL-MCNC: 18 MG/DL (ref 6–20)
BUN/CREAT SERPL: 19 (ref 9–23)
CALCIUM SERPL-MCNC: 9.6 MG/DL (ref 8.7–10.2)
CHLORIDE SERPL-SCNC: 101 MMOL/L (ref 96–106)
CHOLEST SERPL-MCNC: 200 MG/DL (ref 100–199)
CO2 SERPL-SCNC: 21 MMOL/L (ref 20–29)
CREAT SERPL-MCNC: 0.94 MG/DL (ref 0.57–1)
GLOBULIN SER CALC-MCNC: 2.1 G/DL (ref 1.5–4.5)
GLUCOSE SERPL-MCNC: 139 MG/DL (ref 65–99)
HBA1C MFR BLD: 7.5 % (ref 4.8–5.6)
HDLC SERPL-MCNC: 35 MG/DL
LABORATORY COMMENT REPORT: ABNORMAL
LDLC SERPL CALC-MCNC: 96 MG/DL (ref 0–99)
LDLC SERPL DIRECT ASSAY-MCNC: 109 MG/DL (ref 0–99)
POTASSIUM SERPL-SCNC: 4 MMOL/L (ref 3.5–5.2)
PROT SERPL-MCNC: 6.7 G/DL (ref 6–8.5)
SODIUM SERPL-SCNC: 140 MMOL/L (ref 134–144)
TRIGL SERPL-MCNC: 347 MG/DL (ref 0–149)
VLDLC SERPL CALC-MCNC: 69 MG/DL (ref 5–40)

## 2020-03-18 ENCOUNTER — OFFICE VISIT (OUTPATIENT)
Dept: VASCULAR LAB | Facility: MEDICAL CENTER | Age: 40
End: 2020-03-18
Attending: INTERNAL MEDICINE
Payer: COMMERCIAL

## 2020-03-18 VITALS
DIASTOLIC BLOOD PRESSURE: 82 MMHG | HEIGHT: 67 IN | SYSTOLIC BLOOD PRESSURE: 122 MMHG | WEIGHT: 223.3 LBS | HEART RATE: 91 BPM | BODY MASS INDEX: 35.05 KG/M2

## 2020-03-18 DIAGNOSIS — E78.1 HYPERTRIGLYCERIDEMIA: ICD-10-CM

## 2020-03-18 DIAGNOSIS — E78.5 DYSLIPIDEMIA: ICD-10-CM

## 2020-03-18 DIAGNOSIS — I10 ESSENTIAL HYPERTENSION: ICD-10-CM

## 2020-03-18 DIAGNOSIS — E11.9 TYPE 2 DIABETES MELLITUS WITHOUT COMPLICATION, WITHOUT LONG-TERM CURRENT USE OF INSULIN (HCC): ICD-10-CM

## 2020-03-18 PROCEDURE — 99212 OFFICE O/P EST SF 10 MIN: CPT | Performed by: NURSE PRACTITIONER

## 2020-03-18 PROCEDURE — 99214 OFFICE O/P EST MOD 30 MIN: CPT | Performed by: NURSE PRACTITIONER

## 2020-03-18 RX ORDER — ATORVASTATIN CALCIUM 40 MG/1
TABLET, FILM COATED ORAL
Qty: 135 TAB | Refills: 3 | Status: SHIPPED | OUTPATIENT
Start: 2020-03-18 | End: 2021-07-06 | Stop reason: SDUPTHER

## 2020-03-18 RX ORDER — LOSARTAN POTASSIUM AND HYDROCHLOROTHIAZIDE 25; 100 MG/1; MG/1
1 TABLET ORAL DAILY
Qty: 90 TAB | Refills: 3 | Status: SHIPPED | OUTPATIENT
Start: 2020-03-18 | End: 2021-07-06 | Stop reason: SDUPTHER

## 2020-03-18 RX ORDER — DAPAGLIFLOZIN 10 MG/1
10 TABLET, FILM COATED ORAL DAILY
Qty: 90 TAB | Refills: 3 | Status: SHIPPED | OUTPATIENT
Start: 2020-03-18 | End: 2021-07-06 | Stop reason: SDUPTHER

## 2020-03-18 RX ORDER — FENOFIBRATE 145 MG/1
145 TABLET, COATED ORAL DAILY
Qty: 90 TAB | Refills: 3 | Status: SHIPPED | OUTPATIENT
Start: 2020-03-18 | End: 2021-07-06 | Stop reason: SDUPTHER

## 2020-03-18 ASSESSMENT — ENCOUNTER SYMPTOMS
BLOOD IN STOOL: 0
WHEEZING: 0
FOCAL WEAKNESS: 0
NAUSEA: 0
HEADACHES: 0
PALPITATIONS: 0
WEAKNESS: 0
SHORTNESS OF BREATH: 0
MYALGIAS: 0
DOUBLE VISION: 0
BLURRED VISION: 0
DIZZINESS: 0
CHILLS: 0
COUGH: 0
ABDOMINAL PAIN: 0
FEVER: 0
HEMOPTYSIS: 0
DIARRHEA: 0
TREMORS: 0
BRUISES/BLEEDS EASILY: 0
VOMITING: 0
SEIZURES: 0

## 2020-03-18 ASSESSMENT — FIBROSIS 4 INDEX: FIB4 SCORE: 0.64

## 2020-03-18 NOTE — PROGRESS NOTES
[facility]   Date of Service: 3/18/20    Gladys Lombardi has been referred for evaluation and management of dyslipidemia    Referral Source: PCP    HPI    HTN:  Current HTN concerns: No specific concerns since last visit   ADRs: No  Recent studies/labs completed (reviewed with patient, noted below): yes  HTN sx:  No current blurred or changed vision, chest pain, shortness of breath, headache, nausea, dizziness/vertigo   Home BP log: sporadic checks, 152/82 some 140/90 when stressed but taking only onece   24h ABPM completed: not tested   Adherence to current HTN meds: compliant all of the time     Anticoagulation/antiplats:   No    T2D:  No current symptoms reported.   Tolerating meds and no recent med changes.   Home blood sugars stable, reports no lows.   A1c was 9.9 now 7.5%    HLD:   Denies current concerns or symptoms.     History of ASCVD: No  Age at Initial Diagnosis: Oct 2018 - high triglycerides    Current Prescription Lipid Lowering Medications - including dose:   Statin: Atorvastatin  Non-Statin: Fenofibrate  Current Lipid Lowering and Related Supplements:   None  Any Current Side Effects Potentially Related to Lipid Lowering therapy?   No  Current Adherence to Lipid Lowering Therapies   Complete  Previously Attempted Interventions for Lipids - including outcome  Statin: None    Outcome: N/A  Non-Statin: None   Outcome: N/A  Any Previous History of Statin Intolerance?   No  Baseline Lipids Prior to Treatment:   Trigs >2000  Other Pertinent History:   No pancreatitis  No known thyroid disease  No liver or kidney issues  No pregnancies or kids  Not currently sexual active  She does have a history of intermittent chest pain.  She says it is worse with exercise.  She also complains of dyspnea on exertion but has not experience this lately.    CURRENT MEDICATIONS:   Current Outpatient Medications:   •  Dapagliflozin Propanediol, Take  by mouth., Taking  •  ALBUTEROL INH, Inhale  by  mouth., Taking  •  pioglitazone, 30 mg, Oral, DAILY  •  atorvastatin, 40 mg, Oral, DAILY  •  SITagliptin, 100 mg, Oral, DAILY, Taking  •  fenofibrate, 145 mg, Oral, DAILY, Taking  •  buPROPion, 150 mg, Oral, BID, Taking  •  losartan-hydrochlorothiazide, 1 Tab, Oral, DAILY, Taking  •  NuvaRing, USE VAGINALLY AS DIRECTED BY MD, Not Taking  •  OMEPRAZOLE PO, Take  by mouth., Taking     ALLERGIES: Patient has no known allergies.     SOCIAL HISTORY   Social History     Tobacco Use   Smoking Status Current Some Day Smoker   • Packs/day: 0.00   Smokeless Tobacco Never Used   Had quit smoking, but now back to smoking a few a week    Change in weight: decreased   BMI Readings from Last 5 Encounters:   12/27/19 36.15 kg/m²   02/21/19 40.02 kg/m²   04/14/18 41.12 kg/m²   03/30/17 39.00 kg/m²   12/18/16 39.54 kg/m²      Exercise habits: limited by back pain; starting with stretching and yoga  Diet: low carbohydrate, diabetic    Review of Systems   Constitutional: Negative for chills, fever and malaise/fatigue.   Eyes: Negative for blurred vision and double vision.   Respiratory: Negative for cough, hemoptysis, shortness of breath and wheezing.    Cardiovascular: Negative for chest pain, palpitations and leg swelling.   Gastrointestinal: Negative for abdominal pain, blood in stool, diarrhea, nausea and vomiting.   Genitourinary: Negative for hematuria.   Musculoskeletal: Negative for joint pain and myalgias.   Skin: Negative for itching and rash.   Neurological: Negative for dizziness, tremors, focal weakness, seizures, weakness and headaches.   Endo/Heme/Allergies: Does not bruise/bleed easily.     There were no vitals filed for this visit.  BP Readings from Last 5 Encounters:   12/27/19 137/89   02/21/19 129/86   04/14/18 (!) 165/103   03/30/17 124/78   03/10/17 126/84      Physical Exam   Constitutional: She is oriented to person, place, and time. She appears well-developed and well-nourished. No distress.   HENT:   Head:  Normocephalic and atraumatic.   Eyes: Pupils are equal, round, and reactive to light. Conjunctivae and EOM are normal. No scleral icterus.   Neck: Normal range of motion. Neck supple. No JVD present. No thyromegaly present.   Cardiovascular: Normal rate, regular rhythm, normal heart sounds and intact distal pulses. Exam reveals no gallop and no friction rub.   No murmur heard.  Pulmonary/Chest: Effort normal and breath sounds normal. No respiratory distress. She has no wheezes. She has no rales. She exhibits no tenderness.   Diminished throughout   Abdominal: Soft. Bowel sounds are normal. She exhibits no distension and no mass. There is no abdominal tenderness. There is no rebound and no guarding.   Musculoskeletal: Normal range of motion.         General: No tenderness or edema.   Neurological: She is alert and oriented to person, place, and time. She has normal reflexes. No cranial nerve deficit. Coordination normal.   Skin: Skin is warm and dry. No rash noted. She is not diaphoretic. No erythema. No pallor.   Psychiatric: She has a normal mood and affect.   Vitals reviewed.      DATA REVIEW:    Other Pertinent Blood Work:   Lab Results   Component Value Date    SODIUM 140 03/04/2020    SODIUM 133 (L) 04/14/2018    POTASSIUM 4.0 03/04/2020    POTASSIUM 3.6 04/14/2018    CHLORIDE 101 03/04/2020    CHLORIDE 102 04/14/2018    CO2 21 03/04/2020    CO2 21 04/14/2018    ANION 10.0 04/14/2018    GLUCOSE 139 (H) 03/04/2020    GLUCOSE 189 (H) 04/14/2018    BUN 18 03/04/2020    BUN 15 04/14/2018    CREATININE 0.94 03/04/2020    CREATININE 0.69 04/14/2018    CALCIUM 9.6 03/04/2020    CALCIUM 8.7 04/14/2018    ASTSGOT 13 03/04/2020    ASTSGOT 19 04/14/2018    ALTSGPT 15 03/04/2020    ALTSGPT 27 04/14/2018    ALKPHOSPHAT 54 03/04/2020    ALKPHOSPHAT 74 04/14/2018    TBILIRUBIN 0.3 03/04/2020    TBILIRUBIN 0.5 04/14/2018    ALBUMIN 4.6 03/04/2020    ALBUMIN 4.3 04/14/2018    AGRATIO 2.2 03/04/2020    AGRATIO 1.4 04/14/2018     TSHULTRASEN 2.060 2018     Blood work from 2018  A1c 8.4  GFR 99  Albumin creatinine ratio in the urine normal  Total cholesterol 360  Triglycerides 2164  HDL 21    Stress echo 19  Good functional capacity.  negative stress echo     Lab Results   Component Value Date/Time    CHOLSTRLTOT 200 (H) 2020 11:10 AM    LDL 96 2020 11:10 AM    HDL 35 (L) 2020 11:10 AM    TRIGLYCERIDE 347 (H) 2020 11:10 AM     Results for MARTIN CORTEZ (MRN [id number]) as of 2019 13:14   Ref. Range 2019 08:51   Glycohemoglobin Latest Ref Range: 4.8 - 5.6 % 9.9 (H)         VASCULAR IMAGING:     Last EKG:   Results for orders placed or performed during the hospital encounter of 18   EKG (NOW)   Result Value Ref Range    Report       [facility] Emergency Dept.    Test Date:  2018  Pt Name:    MARTIN CORTEZ             Department: [facility]  MRN:        [id number]                      Room:       -ROOM 5  Gender:     Female                       Technician: HRS  :        1980                   Requested By:MIKE SANTIAGO  Order #:    [id number]                    Reading MD:    Measurements  Intervals                                Axis  Rate:       99                           P:          62  GA:         132                          QRS:        33  QRSD:       86                           T:          18  QT:         341  QTc:        438    Interpretive Statements  Sinus rhythm  Probable left atrial enlargement  Baseline wander in lead(s) II,V6  No previous ECG available for comparison       Stress echo 19  Good functional capacity.  negative stress echo.    1. Type 2 diabetes mellitus without complication, without long-term current use of insulin (HCC)     2. Hypertriglyceridemia     3. Essential hypertension     4. Dyslipidemia       ASSESSMENT AND PLAN  Patient Type, check all that apply:   Primary Prevention, Type 2 Diabetes Mellitus and  Metabolic Syndrome     Established Atherosclerotic Cardiovascular Disease (ASCVD):  None     Other Established (non-atherosclerotic) Vascular Disease, if Present:  None    Evidence of Heterozygous Familial Hypercholesterolemia (FH):   No     ACC/AHA Indication for Statin Therapy, stefano all that apply:  Diabetes aged 40-75: Indication for High intensity statin      Calculated Risk for ASCVD, if applicable    N/A     Other Significant Risk Markers, if any, stefano all that apply   Family history of premature ASCVD in first degree relative and Other: severe elevated triglycerides      1) Elevated trigs - severely elevated, high risk for pancreatitis   - reduce etoh, weight, fried/fatty foods   - Increase atorvastatin to 60 mg daily, continue fenofibrate 145mg daily  - Continue pioglitazone 30mg daily   - will add Vascepa 2g BID as next step, at next visit    National Lipid Association (NLA) Goal  LDL-C:   <100 mg/dL and non-HDL less than 130, not at goal   Trigs <150  Low HDL    Lifestyle Recommendations From Today’s Visit:    Eating Plan: Concentrate on  Low simple carb   Some exercise every day  Slow steady weight loss-has dropped 7 lbs since last visit    Statin Therapy Recommendations from Today’s Visit:   - increase atorvastatin to 60mg daily (1.5 tabs)    Non-Statin Medications Recommendations from Today’s Visit:   -Continue fenofibrate 145mg daily   - Continue pioglitazone 30mg daily   -Hold off on trial of  Vasepa 2 g BID for now until next test       Indication for PCSK9 Inhibitor, if applicable:  Not currently indicated  Supplements Recommended at this visit:   -Start vitamin D at least 2 g daily    Recommendations for Other Cardiovascular Risk Factors, stefano all that apply:     Blood Pressure Management:Goal: ACC/AHA (2017) goal <130/80  Home BP at goal: yes,  mostly a few highs when stressed while working   Office BP at goal: yes  Echo: normal  ACR: normal   Device candidate? no  Plan:   Monitoring:   - order  24h ABPM:  UNDECIDED, consider if persistent white coat effect   - monitor lytes/gfr routinely   - contact office if BP consistently >140/>90 to discussion of tx adjustments   Medications:  ACEi/ARB: continue losartan 100mg (combo)  DHP-CCB:Consider adding amlodipine 5mg as next options  Thiazide: continue HCTZ 25mg (combo), consider change to chlorthalidone   Other:   Aldosterone Antagonist: Consider adding spironolactone 25mg as 4th agent   Loop Diuretic: not indicated   Peripheral Alpha Blocker: not indicated   Other CCB: not indicated   Direct Vasodilator: not indicated   Centrally Acting Alpha Agonist: not indicated   Other:   BB: not indicated   DRI: not indicated     T2D: non-insulin, uncontrolled   Did not tolerate metformin due to GI issues.   Last A1c = 9.9  Goal A1c < 7.0  ACR: normal   Plan:  - continue farxiga 10mg daily   - continue januvia 100mg daily   - Continue pioglitazone 30mg daily due to high BS and severe trigs   -Hold off on Trial of Trulicity 0.75 mg until after next A1c- declines diabetes education at the present time and as such I think we should hold off on fingersticks.  - recommmend for routine care with PCP (or endocrine) to include regular A1c monitoring, annual albumin/creatinine ratio (ACR), annual diabetic retinopathy screening, foot exams, annual flu vaccine, and updates to pneumonia vaccines as appropriate     Studies Ordered at Todays Visit: none   Blood Work Ordered At Today’s visit: a1c, direct LDL, CMP, lipid  Follow-Up:3 mo    DEN Velázquez.

## 2020-06-23 ASSESSMENT — ENCOUNTER SYMPTOMS
SHORTNESS OF BREATH: 0
PALPITATIONS: 0
WHEEZING: 0
TREMORS: 0
BLOOD IN STOOL: 0
MYALGIAS: 0
CHILLS: 0
COUGH: 0
FEVER: 0
WEAKNESS: 0
HEADACHES: 0
VOMITING: 0
FOCAL WEAKNESS: 0
SEIZURES: 0
ABDOMINAL PAIN: 0
DIARRHEA: 0
HEMOPTYSIS: 0
BRUISES/BLEEDS EASILY: 0
DIZZINESS: 0
BLURRED VISION: 0
NAUSEA: 0
DOUBLE VISION: 0

## 2020-06-23 NOTE — PROGRESS NOTES
[facility]   Date of Service: 6/24/20    Gladys Lmobardi has been referred for evaluation and management of dyslipidemia    Referral Source: PCP    HPI    HTN:  Current HTN concerns: No specific concerns since last visit   ADRs: No  Recent studies/labs completed (reviewed with patient, noted below): yes  HTN sx:  No current blurred or changed vision, chest pain, shortness of breath, headache, nausea, dizziness/vertigo   Home BP log: ****  24h ABPM completed: not tested   Adherence to current HTN meds: compliant all of the time     Anticoagulation/antiplats:   No    T2D:  No current symptoms reported.   Tolerating meds and no recent med changes.   Home blood sugars stable, reports no lows.   A1c was 9.9 now 7.5%    HLD:   Denies current concerns or symptoms.     History of ASCVD: No  Age at Initial Diagnosis: Oct 2018 - high triglycerides    Current Prescription Lipid Lowering Medications - including dose:   Statin: Atorvastatin  Non-Statin: Fenofibrate  Current Lipid Lowering and Related Supplements:   None  Any Current Side Effects Potentially Related to Lipid Lowering therapy?   No  Current Adherence to Lipid Lowering Therapies   Complete  Previously Attempted Interventions for Lipids - including outcome  Statin: None    Outcome: N/A  Non-Statin: None   Outcome: N/A  Any Previous History of Statin Intolerance?   No  Baseline Lipids Prior to Treatment:   Trigs >2000  Other Pertinent History:   No pancreatitis  No known thyroid disease  No liver or kidney issues  No pregnancies or kids  Not currently sexual active  She does have a history of intermittent chest pain.  She says it is worse with exercise.  She also complains of dyspnea on exertion but has not experience this lately.    CURRENT MEDICATIONS:   Current Outpatient Medications:   •  fenofibrate, 145 mg, Oral, DAILY  •  losartan-hydrochlorothiazide, 1 Tab, Oral, DAILY  •  atorvastatin, Take 1.5 tabs daily in the evening  •  Farxiga, 10  mg, Oral, DAILY  •  SITagliptin, 100 mg, Oral, DAILY  •  ALBUTEROL INH, Inhale  by mouth., PRN  •  pioglitazone, 30 mg, Oral, DAILY, Taking  •  buPROPion, 150 mg, Oral, BID, Taking  •  NuvaRing, USE VAGINALLY AS DIRECTED BY MD, PRN  •  OMEPRAZOLE PO, Take  by mouth., Taking     ALLERGIES: Patient has no known allergies.     SOCIAL HISTORY   Social History     Tobacco Use   Smoking Status Current Some Day Smoker   • Packs/day: 0.00   Smokeless Tobacco Never Used   Had quit smoking, but now back to smoking a few a week    Change in weight: decreased   BMI Readings from Last 5 Encounters:   03/18/20 35.05 kg/m²   12/27/19 36.15 kg/m²   02/21/19 40.02 kg/m²   04/14/18 41.12 kg/m²   03/30/17 39.00 kg/m²      Exercise habits: limited by back pain; starting with stretching and yoga  Diet: low carbohydrate, diabetic    Review of Systems   Constitutional: Negative for chills, fever and malaise/fatigue.   Eyes: Negative for blurred vision and double vision.   Respiratory: Negative for cough, hemoptysis, shortness of breath and wheezing.    Cardiovascular: Negative for chest pain, palpitations and leg swelling.   Gastrointestinal: Negative for abdominal pain, blood in stool, diarrhea, nausea and vomiting.   Genitourinary: Negative for hematuria.   Musculoskeletal: Negative for joint pain and myalgias.   Skin: Negative for itching and rash.   Neurological: Negative for dizziness, tremors, focal weakness, seizures, weakness and headaches.   Endo/Heme/Allergies: Does not bruise/bleed easily.     There were no vitals filed for this visit.  BP Readings from Last 5 Encounters:   03/18/20 122/82   12/27/19 137/89   02/21/19 129/86   04/14/18 (!) 165/103   03/30/17 124/78      Physical Exam   Constitutional: She is oriented to person, place, and time. She appears well-developed and well-nourished. No distress.   HENT:   Head: Normocephalic and atraumatic.   Eyes: Pupils are equal, round, and reactive to light. Conjunctivae and EOM are  normal. No scleral icterus.   Neck: Normal range of motion. Neck supple. No JVD present. No thyromegaly present.   Cardiovascular: Normal rate, regular rhythm, normal heart sounds and intact distal pulses. Exam reveals no gallop and no friction rub.   No murmur heard.  Pulmonary/Chest: Effort normal and breath sounds normal. No respiratory distress. She has no wheezes. She has no rales. She exhibits no tenderness.   Diminished throughout   Abdominal: Soft. Bowel sounds are normal. She exhibits no distension and no mass. There is no abdominal tenderness. There is no rebound and no guarding.   Musculoskeletal: Normal range of motion.         General: No tenderness or edema.   Neurological: She is alert and oriented to person, place, and time. She has normal reflexes. No cranial nerve deficit. Coordination normal.   Skin: Skin is warm and dry. No rash noted. She is not diaphoretic. No erythema. No pallor.   Psychiatric: She has a normal mood and affect.   Vitals reviewed.      DATA REVIEW:    Other Pertinent Blood Work:   Lab Results   Component Value Date    SODIUM 140 03/04/2020    POTASSIUM 4.0 03/04/2020    CHLORIDE 101 03/04/2020    CO2 21 03/04/2020    GLUCOSE 139 (H) 03/04/2020    BUN 18 03/04/2020    CREATININE 0.94 03/04/2020    CALCIUM 9.6 03/04/2020    ASTSGOT 13 03/04/2020    ALTSGPT 15 03/04/2020    ALKPHOSPHAT 54 03/04/2020    TBILIRUBIN 0.3 03/04/2020    ALBUMIN 4.6 03/04/2020    AGRATIO 2.2 03/04/2020     Blood work from October 2018  A1c 8.4  GFR 99  Albumin creatinine ratio in the urine normal  Total cholesterol 360  Triglycerides 2164  HDL 21    Stress echo 8/27/19  Good functional capacity.  negative stress echo     Lab Results   Component Value Date/Time    CHOLSTRLTOT 200 (H) 03/04/2020 11:10 AM    LDL 96 03/04/2020 11:10 AM    HDL 35 (L) 03/04/2020 11:10 AM    TRIGLYCERIDE 347 (H) 03/04/2020 11:10 AM     Results for MARTIN CORTEZ (MRN [id number]) as of 12/27/2019 13:14   Ref. Range 11/22/2019  08:51   Glycohemoglobin Latest Ref Range: 4.8 - 5.6 % 9.9 (H)         VASCULAR IMAGING:     Last EKG:   Results for orders placed or performed during the hospital encounter of 18   EKG (NOW)   Result Value Ref Range    Report       [facility] Emergency Dept.    Test Date:  2018  Pt Name:    MARTIN CORTEZ             Department: [facility]  MRN:        [id number]                      Room:       -ROOM 5  Gender:     Female                       Technician: HRS  :        1980                   Requested By:MIKE SANTIAGO  Order #:    [id number]                    Reading MD:    Measurements  Intervals                                Axis  Rate:       99                           P:          62  OR:         132                          QRS:        33  QRSD:       86                           T:          18  QT:         341  QTc:        438    Interpretive Statements  Sinus rhythm  Probable left atrial enlargement  Baseline wander in lead(s) II,V6  No previous ECG available for comparison       Stress echo 19  Good functional capacity.  negative stress echo.    1. Type 2 diabetes mellitus without complication, without long-term current use of insulin (HCC)     2. Hypertriglyceridemia     3. Essential hypertension     4. Dyslipidemia       ASSESSMENT AND PLAN  Patient Type, check all that apply:   Primary Prevention, Type 2 Diabetes Mellitus and Metabolic Syndrome     Established Atherosclerotic Cardiovascular Disease (ASCVD):  None     Other Established (non-atherosclerotic) Vascular Disease, if Present:  None    Evidence of Heterozygous Familial Hypercholesterolemia (FH):   No     ACC/AHA Indication for Statin Therapy, stefano all that apply:  Diabetes aged 40-75: Indication for High intensity statin      Calculated Risk for ASCVD, if applicable    N/A     Other Significant Risk Markers, if any, stefano all that apply   Family history of premature ASCVD in first degree relative and  Other: severe elevated triglycerides      1) Elevated trigs - severely elevated, high risk for pancreatitis   - reduce etoh, weight, fried/fatty foods   - Increase atorvastatin to 60 mg daily, continue fenofibrate 145mg daily  - Continue pioglitazone 30mg daily   - will add Vascepa 2g BID as next step, at next visit    National Lipid Association (NLA) Goal  LDL-C:   <100 mg/dL and non-HDL less than 130, not at goal   Trigs <150  Low HDL    Lifestyle Recommendations From Today’s Visit:    Eating Plan: Concentrate on  Low simple carb   Some exercise every day  Slow steady weight loss-has dropped 7 lbs since last visit    Statin Therapy Recommendations from Today’s Visit:   - increase atorvastatin to 60mg daily (1.5 tabs)    Non-Statin Medications Recommendations from Today’s Visit:   -Continue fenofibrate 145mg daily   - Continue pioglitazone 30mg daily   -Hold off on trial of  Vasepa 2 g BID for now until next test       Indication for PCSK9 Inhibitor, if applicable:  Not currently indicated  Supplements Recommended at this visit:   -Start vitamin D at least 2 g daily    Recommendations for Other Cardiovascular Risk Factors, stefano all that apply:     Blood Pressure Management:Goal: ACC/AHA (2017) goal <130/80  Home BP at goal: yes,  mostly a few highs when stressed while working   Office BP at goal: yes  Echo: normal  ACR: normal   Device candidate? no  Plan:   Monitoring:   - order 24h ABPM:  UNDECIDED, consider if persistent white coat effect   - monitor lytes/gfr routinely   - contact office if BP consistently >140/>90 to discussion of tx adjustments   Medications:  ACEi/ARB: continue losartan 100mg (combo)  DHP-CCB:Consider adding amlodipine 5mg as next options  Thiazide: continue HCTZ 25mg (combo), consider change to chlorthalidone   Other:   Aldosterone Antagonist: Consider adding spironolactone 25mg as 4th agent   Loop Diuretic: not indicated   Peripheral Alpha Blocker: not indicated   Other CCB: not indicated    Direct Vasodilator: not indicated   Centrally Acting Alpha Agonist: not indicated   Other:   BB: not indicated   DRI: not indicated     T2D: non-insulin, uncontrolled   Did not tolerate metformin due to GI issues.   Last A1c = 9.9  Goal A1c < 7.0  ACR: normal   Plan:  - continue farxiga 10mg daily   - continue januvia 100mg daily   - Continue pioglitazone 30mg daily due to high BS and severe trigs   -Hold off on Trial of Trulicity 0.75 mg until after next A1c- declines diabetes education at the present time and as such I think we should hold off on fingersticks.  - recommmend for routine care with PCP (or endocrine) to include regular A1c monitoring, annual albumin/creatinine ratio (ACR), annual diabetic retinopathy screening, foot exams, annual flu vaccine, and updates to pneumonia vaccines as appropriate     Studies Ordered at Todays Visit: none   Blood Work Ordered At Today’s visit: a1c, direct LDL, CMP, lipid  Follow-Up:3 mo    DEN Velázquez.

## 2020-06-24 ENCOUNTER — APPOINTMENT (OUTPATIENT)
Dept: VASCULAR LAB | Facility: MEDICAL CENTER | Age: 40
End: 2020-06-24
Attending: INTERNAL MEDICINE
Payer: COMMERCIAL

## 2020-07-20 ENCOUNTER — TELEPHONE (OUTPATIENT)
Dept: VASCULAR LAB | Facility: MEDICAL CENTER | Age: 40
End: 2020-07-20

## 2020-09-17 LAB
ALBUMIN SERPL-MCNC: 4.7 G/DL (ref 3.8–4.8)
ALBUMIN/GLOB SERPL: 2.2 {RATIO} (ref 1.2–2.2)
ALP SERPL-CCNC: 56 IU/L (ref 39–117)
ALT SERPL-CCNC: 12 IU/L (ref 0–32)
AST SERPL-CCNC: 8 IU/L (ref 0–40)
BILIRUB SERPL-MCNC: 0.5 MG/DL (ref 0–1.2)
BUN SERPL-MCNC: 15 MG/DL (ref 6–20)
BUN/CREAT SERPL: 17 (ref 9–23)
CALCIUM SERPL-MCNC: 9.6 MG/DL (ref 8.7–10.2)
CHLORIDE SERPL-SCNC: 99 MMOL/L (ref 96–106)
CHOLEST SERPL-MCNC: 221 MG/DL (ref 100–199)
CO2 SERPL-SCNC: 20 MMOL/L (ref 20–29)
CREAT SERPL-MCNC: 0.88 MG/DL (ref 0.57–1)
GLOBULIN SER CALC-MCNC: 2.1 G/DL (ref 1.5–4.5)
GLUCOSE SERPL-MCNC: 158 MG/DL (ref 65–99)
HBA1C MFR BLD: 7.6 % (ref 4.8–5.6)
HDLC SERPL-MCNC: 40 MG/DL
LABORATORY COMMENT REPORT: ABNORMAL
LDLC SERPL CALC-MCNC: 134 MG/DL (ref 0–99)
LDLC SERPL DIRECT ASSAY-MCNC: 144 MG/DL (ref 0–99)
POTASSIUM SERPL-SCNC: 3.5 MMOL/L (ref 3.5–5.2)
PROT SERPL-MCNC: 6.8 G/DL (ref 6–8.5)
SODIUM SERPL-SCNC: 137 MMOL/L (ref 134–144)
TRIGL SERPL-MCNC: 262 MG/DL (ref 0–149)
VLDLC SERPL CALC-MCNC: 47 MG/DL (ref 5–40)

## 2020-09-22 ASSESSMENT — ENCOUNTER SYMPTOMS
DIZZINESS: 0
WHEEZING: 0
DIARRHEA: 0
HEMOPTYSIS: 0
FEVER: 0
DOUBLE VISION: 0
CHILLS: 0
VOMITING: 0
COUGH: 0
BRUISES/BLEEDS EASILY: 0
WEAKNESS: 0
SEIZURES: 0
SHORTNESS OF BREATH: 0
HEADACHES: 0
ABDOMINAL PAIN: 0
MYALGIAS: 0
BLOOD IN STOOL: 0
BLURRED VISION: 0
PALPITATIONS: 0
FOCAL WEAKNESS: 0
TREMORS: 0
NAUSEA: 0

## 2020-09-22 NOTE — PROGRESS NOTES
[facility]   Date of Service: 9/23/20    Gladys Lombardi has been referred for evaluation and management of dyslipidemia    Referral Source: PCP    HPI    HTN:  Current HTN concerns: No specific concerns since last visit   ADRs: No  Recent studies/labs completed (reviewed with patient, noted below): yes  HTN sx:  No current blurred or changed vision, chest pain, shortness of breath, headache, nausea, dizziness/vertigo   Home BP log: sporadic checks, 145/96 at work but 110-120/80's when not working  24h ABPM completed: not tested   Adherence to current HTN meds: compliant all of the time     Anticoagulation/antiplats:   No    T2D:  No current symptoms reported.   Tolerating meds and no recent med changes.   Home blood sugars stable, reports no lows.   A1c was 9.9 now 7.5%    HLD:   Denies current concerns or symptoms.Taking less than her prescribed dose of atorvastatin     History of ASCVD: No  Age at Initial Diagnosis: Oct 2018 - high triglycerides    Current Prescription Lipid Lowering Medications - including dose:   Statin: Atorvastatin  Non-Statin: Fenofibrate  Current Lipid Lowering and Related Supplements:   None  Any Current Side Effects Potentially Related to Lipid Lowering therapy?   No  Current Adherence to Lipid Lowering Therapies   Complete  Previously Attempted Interventions for Lipids - including outcome  Statin: None    Outcome: N/A  Non-Statin: None   Outcome: N/A  Any Previous History of Statin Intolerance?   No  Baseline Lipids Prior to Treatment:   Trigs >2000  Other Pertinent History:   No pancreatitis  No known thyroid disease  No liver or kidney issues  No pregnancies or kids  Not currently sexual active  She does have a history of intermittent chest pain.  She says it is worse with exercise.  She also complains of dyspnea on exertion but has not experience this lately.    CURRENT MEDICATIONS:   Current Outpatient Medications:   •  fenofibrate, 145 mg, Oral, DAILY  •   losartan-hydrochlorothiazide, 1 Tab, Oral, DAILY  •  atorvastatin, Take 1.5 tabs daily in the evening  •  Farxiga, 10 mg, Oral, DAILY  •  SITagliptin, 100 mg, Oral, DAILY  •  ALBUTEROL INH, Inhale  by mouth.  •  pioglitazone, 30 mg, Oral, DAILY  •  buPROPion, 150 mg, Oral, BID  •  NuvaRing, USE VAGINALLY AS DIRECTED BY MD  •  OMEPRAZOLE PO, Take  by mouth.     ALLERGIES: Patient has no known allergies.     SOCIAL HISTORY   Social History     Tobacco Use   Smoking Status Current Some Day Smoker   • Packs/day: 0.00   Smokeless Tobacco Never Used   Had quit smoking, but now back to smoking a few a week    Change in weight: decreased   BMI Readings from Last 5 Encounters:   03/18/20 35.05 kg/m²   12/27/19 36.15 kg/m²   02/21/19 40.02 kg/m²   04/14/18 41.12 kg/m²   03/30/17 39.00 kg/m²      Exercise habits: limited by back pain; starting with stretching and yoga-goal to loose 10 lbs in 3 months, genet system   Diet:Gained 21 lbs since last visit - low carbohydrate, diabetic going to do a diet with her friend    Review of Systems   Constitutional: Negative for chills, fever and malaise/fatigue.   Eyes: Negative for blurred vision and double vision.   Respiratory: Negative for cough, hemoptysis, shortness of breath and wheezing.    Cardiovascular: Negative for chest pain, palpitations and leg swelling.   Gastrointestinal: Negative for abdominal pain, blood in stool, diarrhea, nausea and vomiting.   Genitourinary: Negative for hematuria.   Musculoskeletal: Negative for joint pain and myalgias.   Skin: Negative for itching and rash.   Neurological: Negative for dizziness, tremors, focal weakness, seizures, weakness and headaches.   Endo/Heme/Allergies: Does not bruise/bleed easily.     There were no vitals filed for this visit.  BP Readings from Last 5 Encounters:   03/18/20 122/82   12/27/19 137/89   02/21/19 129/86   04/14/18 (!) 165/103   03/30/17 124/78      Physical Exam   Constitutional: She is oriented to person,  place, and time. She appears well-developed and well-nourished. No distress.   HENT:   Head: Normocephalic and atraumatic.   Eyes: Pupils are equal, round, and reactive to light. Conjunctivae and EOM are normal. No scleral icterus.   Neck: Normal range of motion. Neck supple. No JVD present. No thyromegaly present.   Cardiovascular: Normal rate, regular rhythm, normal heart sounds and intact distal pulses. Exam reveals no gallop and no friction rub.   No murmur heard.  Pulmonary/Chest: Effort normal and breath sounds normal. No respiratory distress. She has no wheezes. She has no rales. She exhibits no tenderness.   Diminished throughout   Abdominal: Soft. Bowel sounds are normal. She exhibits no distension and no mass. There is no abdominal tenderness. There is no rebound and no guarding.   Musculoskeletal: Normal range of motion.         General: No tenderness or edema.   Neurological: She is alert and oriented to person, place, and time. She has normal reflexes. No cranial nerve deficit. Coordination normal.   Skin: Skin is warm and dry. No rash noted. She is not diaphoretic. No erythema. No pallor.   Psychiatric: She has a normal mood and affect.   Vitals reviewed.      DATA REVIEW:    Other Pertinent Blood Work:   Lab Results   Component Value Date    SODIUM 137 09/15/2020    POTASSIUM 3.5 09/15/2020    CHLORIDE 99 09/15/2020    CO2 20 09/15/2020    GLUCOSE 158 (H) 09/15/2020    BUN 15 09/15/2020    CREATININE 0.88 09/15/2020    CALCIUM 9.6 09/15/2020    ASTSGOT 8 09/15/2020    ALTSGPT 12 09/15/2020    ALKPHOSPHAT 56 09/15/2020    TBILIRUBIN 0.5 09/15/2020    ALBUMIN 4.7 09/15/2020    AGRATIO 2.2 09/15/2020     Blood work from October 2018  A1c 8.4  GFR 99  Albumin creatinine ratio in the urine normal  Total cholesterol 360  Triglycerides 2164  HDL 21    Stress echo 8/27/19  Good functional capacity.  negative stress echo     Lab Results   Component Value Date/Time    CHOLSTRLTOT 221 (H) 09/15/2020 12:13 PM     LDL 96 2020 11:10 AM    HDL 40 09/15/2020 12:13 PM    TRIGLYCERIDE 262 (H) 09/15/2020 12:13 PM     Results for MARTIN CORTEZ (MRN [id number]) as of 2019 13:14   Ref. Range 2019 08:51   Glycohemoglobin Latest Ref Range: 4.8 - 5.6 % 9.9 (H)         VASCULAR IMAGING:     Last EKG:   Results for orders placed or performed during the hospital encounter of 18   EKG (NOW)   Result Value Ref Range    Report       [facility] Emergency Dept.    Test Date:  2018  Pt Name:    MARTIN CORTEZ             Department: [facility]  MRN:        [id number]                      Room:       -ROOM 5  Gender:     Female                       Technician: HRS  :        1980                   Requested By:MIKE SANTIAGO  Order #:    [id number]                    Reading MD:    Measurements  Intervals                                Axis  Rate:       99                           P:          62  SC:         132                          QRS:        33  QRSD:       86                           T:          18  QT:         341  QTc:        438    Interpretive Statements  Sinus rhythm  Probable left atrial enlargement  Baseline wander in lead(s) II,V6  No previous ECG available for comparison       Stress echo 19  Good functional capacity.  negative stress echo.    1. Dyslipidemia     2. Essential hypertension     3. Hypertriglyceridemia     4. Type 2 diabetes mellitus without complication, without long-term current use of insulin (HCC)       ASSESSMENT AND PLAN  Patient Type, check all that apply:   Primary Prevention, Type 2 Diabetes Mellitus and Metabolic Syndrome     Established Atherosclerotic Cardiovascular Disease (ASCVD):  None     Other Established (non-atherosclerotic) Vascular Disease, if Present:  None    Evidence of Heterozygous Familial Hypercholesterolemia (FH):   No     ACC/AHA Indication for Statin Therapy, stefano all that apply:  Diabetes aged 40-75: Indication for High  intensity statin      Calculated Risk for ASCVD, if applicable    N/A     Other Significant Risk Markers, if any, stefano all that apply   Family history of premature ASCVD in first degree relative and Other: severe elevated triglycerides      1) Elevated trigs - severely elevated, high risk for pancreatitis   - reduce etoh, weight, fried/fatty foods   - Increase atorvastatin back to 60 mg daily, continue fenofibrate 145mg daily  - Continue pioglitazone 30mg daily   - Consider  Vascepa on next visit if not improved    -    National Lipid Association (NLA) Goal  LDL-C:   <100 mg/dL and non-HDL less than 130, not at goal   Trigs <150  Low HDL    Lifestyle Recommendations From Today’s Visit:    Eating Plan: Concentrate on  Low simple carb   Some exercise every day  Slow steady weight loss-has dropped 7 lbs since last visit    Statin Therapy Recommendations from Today’s Visit:   - increase atorvastatin to 80mg daily     Non-Statin Medications Recommendations from Today’s Visit:   -Continue fenofibrate 145mg daily   - Continue pioglitazone 30mg daily   - Trial  of  Vasepa 2 g BID for now until next test       Indication for PCSK9 Inhibitor, if applicable:  Not currently indicated  Supplements Recommended at this visit:   -Start vitamin D at least 2 g daily    Recommendations for Other Cardiovascular Risk Factors, stefano all that apply:     Blood Pressure Management:Goal: ACC/AHA (2017) goal <130/80  Home BP at goal: yes,  mostly a few highs when stressed while working   Office BP at goal: yes  Echo: normal  ACR: normal   Device candidate? No  Needs FMLA papers for bathroom breaks due to diuretic and Farxiga.   Plan:   Monitoring:   - order 24h ABPM:  UNDECIDED, consider if persistent white coat effect   - monitor lytes/gfr routinely   - contact office if BP consistently >140/>90 to discussion of tx adjustments   Medications:  ACEi/ARB: continue losartan 100mg (combo)  DHP-CCB:Consider adding amlodipine 5mg as next  options  Thiazide: continue HCTZ 25mg (combo), consider change to chlorthalidone   Other:   Aldosterone Antagonist: Consider adding spironolactone 25mg as 4th agent   Loop Diuretic: not indicated   Peripheral Alpha Blocker: not indicated   Other CCB: not indicated   Direct Vasodilator: not indicated   Centrally Acting Alpha Agonist: not indicated   Other:   BB: not indicated   DRI: not indicated     T2D: non-insulin, uncontrolled   Did not tolerate metformin due to GI issues.   Last A1c = 7.6, High 9.9  Goal A1c < 7.0  ACR: normal   Plan:  - continue farxiga 10mg daily   - continue januvia 100mg daily was taking 50 mg   - Continue pioglitazone 30mg daily due to high BS and severe trigs   -Hold off on Trial of Trulicity 0.75 mg until after next A1c- declines diabetes education at the present time and as such I think we should hold off on fingersticks.  - recommmend for routine care with PCP (or endocrine) to include regular A1c monitoring, annual albumin/creatinine ratio (ACR), annual diabetic retinopathy screening, foot exams, annual flu vaccine, and updates to pneumonia vaccines as appropriate     Studies Ordered at Todays Visit: none   Blood Work Ordered At Today’s visit: a1c, direct LDL, CMP, lipid  Follow-Up:3 mo    DEN Velázquez.

## 2020-09-23 ENCOUNTER — OFFICE VISIT (OUTPATIENT)
Dept: VASCULAR LAB | Facility: MEDICAL CENTER | Age: 40
End: 2020-09-23
Attending: NURSE PRACTITIONER
Payer: COMMERCIAL

## 2020-09-23 VITALS
WEIGHT: 244 LBS | BODY MASS INDEX: 39.21 KG/M2 | HEART RATE: 114 BPM | DIASTOLIC BLOOD PRESSURE: 86 MMHG | SYSTOLIC BLOOD PRESSURE: 124 MMHG | HEIGHT: 66 IN

## 2020-09-23 DIAGNOSIS — E78.1 HYPERTRIGLYCERIDEMIA: ICD-10-CM

## 2020-09-23 DIAGNOSIS — E78.5 DYSLIPIDEMIA: ICD-10-CM

## 2020-09-23 DIAGNOSIS — E11.9 TYPE 2 DIABETES MELLITUS WITHOUT COMPLICATION, WITHOUT LONG-TERM CURRENT USE OF INSULIN (HCC): ICD-10-CM

## 2020-09-23 DIAGNOSIS — I10 ESSENTIAL HYPERTENSION: ICD-10-CM

## 2020-09-23 PROCEDURE — 99214 OFFICE O/P EST MOD 30 MIN: CPT | Performed by: NURSE PRACTITIONER

## 2020-11-23 ENCOUNTER — TELEPHONE (OUTPATIENT)
Dept: VASCULAR LAB | Facility: MEDICAL CENTER | Age: 40
End: 2020-11-23

## 2020-11-23 NOTE — TELEPHONE ENCOUNTER
MARTIN CORTEZ (Key: [id number]) - [id number]  Farxiga 10MG tablets  Status: PA Response - Approved    Created: November 23rd, 2020    Sent: November 23rd, 2020

## 2020-12-29 ENCOUNTER — APPOINTMENT (OUTPATIENT)
Dept: VASCULAR LAB | Facility: MEDICAL CENTER | Age: 40
End: 2020-12-29
Payer: COMMERCIAL

## 2021-06-09 ENCOUNTER — DOCUMENTATION (OUTPATIENT)
Dept: VASCULAR LAB | Facility: MEDICAL CENTER | Age: 41
End: 2021-06-09

## 2021-06-09 ENCOUNTER — HOSPITAL ENCOUNTER (OUTPATIENT)
Dept: RADIOLOGY | Facility: MEDICAL CENTER | Age: 41
End: 2021-06-09
Attending: NURSE PRACTITIONER
Payer: COMMERCIAL

## 2021-06-09 DIAGNOSIS — R10.11 RUQ PAIN: ICD-10-CM

## 2021-06-09 PROCEDURE — 76700 US EXAM ABDOM COMPLETE: CPT

## 2021-07-01 LAB
ALBUMIN SERPL-MCNC: 4.1 G/DL (ref 3.8–4.8)
ALBUMIN/GLOB SERPL: 1.8 {RATIO} (ref 1.2–2.2)
ALP SERPL-CCNC: 68 IU/L (ref 48–121)
ALT SERPL-CCNC: 12 IU/L (ref 0–32)
AST SERPL-CCNC: 16 IU/L (ref 0–40)
BILIRUB SERPL-MCNC: 0.4 MG/DL (ref 0–1.2)
BUN SERPL-MCNC: 13 MG/DL (ref 6–24)
BUN/CREAT SERPL: 20 (ref 9–23)
CALCIUM SERPL-MCNC: 8.9 MG/DL (ref 8.7–10.2)
CHLORIDE SERPL-SCNC: 93 MMOL/L (ref 96–106)
CO2 SERPL-SCNC: 17 MMOL/L (ref 20–29)
CREAT SERPL-MCNC: 0.65 MG/DL (ref 0.57–1)
EST. AVERAGE GLUCOSE BLD GHB EST-MCNC: 243 MG/DL
GLOBULIN SER CALC-MCNC: 2.3 G/DL (ref 1.5–4.5)
GLUCOSE SERPL-MCNC: 217 MG/DL (ref 65–99)
HBA1C MFR BLD: 10.1 % (ref 4.8–5.6)
LABORATORY COMMENT REPORT: NORMAL
LDLC SERPL DIRECT ASSAY-MCNC: 46 MG/DL (ref 0–99)
POTASSIUM SERPL-SCNC: 4 MMOL/L (ref 3.5–5.2)
PROT SERPL-MCNC: 6.4 G/DL (ref 6–8.5)
SODIUM SERPL-SCNC: 129 MMOL/L (ref 134–144)

## 2021-07-06 DIAGNOSIS — E11.9 TYPE 2 DIABETES MELLITUS WITHOUT COMPLICATION, WITHOUT LONG-TERM CURRENT USE OF INSULIN (HCC): ICD-10-CM

## 2021-07-06 DIAGNOSIS — E78.1 HYPERTRIGLYCERIDEMIA: ICD-10-CM

## 2021-07-06 DIAGNOSIS — I10 ESSENTIAL HYPERTENSION: ICD-10-CM

## 2021-07-06 DIAGNOSIS — E78.5 DYSLIPIDEMIA: ICD-10-CM

## 2021-07-06 RX ORDER — LOSARTAN POTASSIUM AND HYDROCHLOROTHIAZIDE 25; 100 MG/1; MG/1
1 TABLET ORAL DAILY
Qty: 90 TABLET | Refills: 3 | Status: SHIPPED | OUTPATIENT
Start: 2021-07-06 | End: 2021-09-20 | Stop reason: SDUPTHER

## 2021-07-06 RX ORDER — FENOFIBRATE 145 MG/1
145 TABLET, COATED ORAL DAILY
Qty: 90 TABLET | Refills: 3 | Status: SHIPPED | OUTPATIENT
Start: 2021-07-06 | End: 2021-09-20 | Stop reason: SDUPTHER

## 2021-07-06 RX ORDER — DAPAGLIFLOZIN 10 MG/1
10 TABLET, FILM COATED ORAL DAILY
Qty: 90 TABLET | Refills: 3 | Status: SHIPPED | OUTPATIENT
Start: 2021-07-06 | End: 2021-07-16 | Stop reason: SDUPTHER

## 2021-07-06 RX ORDER — ATORVASTATIN CALCIUM 40 MG/1
TABLET, FILM COATED ORAL
Qty: 135 TABLET | Refills: 3 | Status: SHIPPED | OUTPATIENT
Start: 2021-07-06 | End: 2021-09-20 | Stop reason: SDUPTHER

## 2021-07-15 ENCOUNTER — APPOINTMENT (OUTPATIENT)
Dept: VASCULAR LAB | Facility: MEDICAL CENTER | Age: 41
End: 2021-07-15
Payer: COMMERCIAL

## 2021-07-16 ENCOUNTER — OFFICE VISIT (OUTPATIENT)
Dept: VASCULAR LAB | Facility: MEDICAL CENTER | Age: 41
End: 2021-07-16
Attending: NURSE PRACTITIONER
Payer: COMMERCIAL

## 2021-07-16 DIAGNOSIS — I10 ESSENTIAL HYPERTENSION: ICD-10-CM

## 2021-07-16 DIAGNOSIS — E78.5 DYSLIPIDEMIA: ICD-10-CM

## 2021-07-16 DIAGNOSIS — E88.810 METABOLIC SYNDROME: ICD-10-CM

## 2021-07-16 DIAGNOSIS — E11.9 TYPE 2 DIABETES MELLITUS WITHOUT COMPLICATION, WITHOUT LONG-TERM CURRENT USE OF INSULIN (HCC): ICD-10-CM

## 2021-07-16 PROCEDURE — 99214 OFFICE O/P EST MOD 30 MIN: CPT | Performed by: NURSE PRACTITIONER

## 2021-07-16 ASSESSMENT — ENCOUNTER SYMPTOMS
DOUBLE VISION: 0
FEVER: 0
DIARRHEA: 0
BLURRED VISION: 0
DIZZINESS: 0
NAUSEA: 0
TREMORS: 0
BLOOD IN STOOL: 0
MYALGIAS: 0
PALPITATIONS: 0
COUGH: 0
FOCAL WEAKNESS: 0
SHORTNESS OF BREATH: 0
WEAKNESS: 0
SEIZURES: 0
HEMOPTYSIS: 0
ABDOMINAL PAIN: 0
CHILLS: 0
WHEEZING: 0
BRUISES/BLEEDS EASILY: 0
HEADACHES: 0
VOMITING: 0

## 2021-07-16 NOTE — PROGRESS NOTES
This visit was conducted via [employer] video call using secure and encrypted video conferencing technology due to covid-19 restrictions.   The patient was in a private location in the state of [place].    The patient's identity was confirmed and verbal consent was obtained for this virtual visit.    [facility] - Follow-Up   Date of Service: 07/16/2021    Gladys Lombardi has been referred for evaluation and management of dyslipidemia    Referral Source: PCP    HPI    HTN:  Current HTN concerns: No specific concerns since last visit   ADRs: No  Recent studies/labs completed (reviewed with patient, noted below): yes  HTN sx:  No current blurred or changed vision, chest pain, shortness of breath, headache, nausea, dizziness/vertigo   Home BP log: sporadic checks, no very often   24h ABPM completed: not tested   Adherence to current HTN meds: compliant all of the time     Anticoagulation/antiplats:   No    T2D:  No current symptoms reported.   Not taking medications consistently d/t running out of refills     HLD:   Denies current concerns or symptoms.Taking less than her prescribed dose of atorvastatin     History of ASCVD: No  Age at Initial Diagnosis: Oct 2018 - high triglycerides    Current Prescription Lipid Lowering Medications - including dose:   Statin: Atorvastatin  Non-Statin: Fenofibrate  Current Lipid Lowering and Related Supplements:   None  Any Current Side Effects Potentially Related to Lipid Lowering therapy?   No  Current Adherence to Lipid Lowering Therapies   Complete  Previously Attempted Interventions for Lipids - including outcome  Statin: None    Outcome: N/A  Non-Statin: None   Outcome: N/A  Any Previous History of Statin Intolerance?   No  Baseline Lipids Prior to Treatment:   Trigs >2000  Other Pertinent History:   No pancreatitis  No known thyroid disease  No liver or kidney issues  No pregnancies or kids  Not currently sexual active  She does have a history of intermittent chest pain.   She says it is worse with exercise.  She also complains of dyspnea on exertion but has not experience this lately.    CURRENT MEDICATIONS:   Current Outpatient Medications:   •  Icosapent Ethyl, 2 tablet, Oral, BID  •  Farxiga, 10 mg, Oral, DAILY  •  SITagliptin, 100 mg, Oral, DAILY  •  fenofibrate, 145 mg, Oral, DAILY  •  losartan-hydrochlorothiazide, 1 tablet, Oral, DAILY  •  atorvastatin, Take 1.5 tabs daily in the evening  •  ALBUTEROL INH, Inhale  by mouth.  •  buPROPion, 150 mg, Oral, BID  •  NuvaRing, USE VAGINALLY AS DIRECTED BY MD  •  OMEPRAZOLE PO, Take  by mouth.     ALLERGIES: Patient has no known allergies.     SOCIAL HISTORY   Social History     Tobacco Use   Smoking Status Current Some Day Smoker   • Packs/day: 0.00   Smokeless Tobacco Never Used   Had quit smoking, but now back to smoking a few a week    Change in weight: decreased   BMI Readings from Last 5 Encounters:   09/23/20 39.38 kg/m²   03/18/20 35.05 kg/m²   12/27/19 36.15 kg/m²   02/21/19 40.02 kg/m²   04/14/18 41.12 kg/m²      Exercise habits: limited by back pain; starting with stretching and yoga-goal to loose 10 lbs in 3 months, genet system   Diet:Gained 21 lbs since last visit - low carbohydrate, diabetic going to do a diet with her friend    Review of Systems   Constitutional: Negative for chills, fever and malaise/fatigue.   Eyes: Negative for blurred vision and double vision.   Respiratory: Negative for cough, hemoptysis, shortness of breath and wheezing.    Cardiovascular: Negative for chest pain, palpitations and leg swelling.   Gastrointestinal: Negative for abdominal pain, blood in stool, diarrhea, nausea and vomiting.   Genitourinary: Negative for hematuria.   Musculoskeletal: Negative for joint pain and myalgias.   Skin: Negative for itching and rash.   Neurological: Negative for dizziness, tremors, focal weakness, seizures, weakness and headaches.   Endo/Heme/Allergies: Does not bruise/bleed easily.     There were no vitals  filed for this visit.  BP Readings from Last 5 Encounters:   20 124/86   20 122/82   19 137/89   19 129/86   18 (!) 165/103      Physical Exam  Constitutional:       Appearance: Normal appearance.   HENT:      Head: Normocephalic and atraumatic.   Pulmonary:      Effort: Pulmonary effort is normal.   Neurological:      Mental Status: She is alert.   Psychiatric:         Mood and Affect: Mood normal.         Behavior: Behavior normal.       DATA REVIEW:    Other Pertinent Blood Work:   Lab Results   Component Value Date    SODIUM 129 (L) 2021    POTASSIUM 4.0 2021    CHLORIDE 93 (L) 2021    CO2 17 (L) 2021    GLUCOSE 217 (H) 2021    BUN 13 2021    CREATININE 0.65 2021    CALCIUM 8.9 2021    ASTSGOT 16 2021    ALTSGPT 12 2021    ALKPHOSPHAT 68 2021    TBILIRUBIN 0.4 2021    ALBUMIN 4.1 2021    AGRATIO 1.8 2021     Blood work from 2018  A1c 8.4  GFR 99  Albumin creatinine ratio in the urine normal  Total cholesterol 360  Triglycerides 2164  HDL 21    Stress echo 19  Good functional capacity.  negative stress echo     Lab Results   Component Value Date/Time    CHOLSTRLTOT 221 (H) 09/15/2020 12:13 PM    LDL 96 2020 11:10 AM    HDL 40 09/15/2020 12:13 PM    TRIGLYCERIDE 262 (H) 09/15/2020 12:13 PM     Results for MARTIN CORTEZ (MRN [id number]) as of 2019 13:14   Ref. Range 2019 08:51   Glycohemoglobin Latest Ref Range: 4.8 - 5.6 % 9.9 (H)     VASCULAR IMAGING:     Last EKG:   Results for orders placed or performed during the hospital encounter of 18   EKG (NOW)   Result Value Ref Range    Report       [facility] Emergency Dept.    Test Date:  2018  Pt Name:    MARTIN CORTEZ             Department: [facility]  MRN:        [id number]                      Room:       SM-ROOM 5  Gender:     Female                       Technician: JUANJOSE  :         1980                   Requested By:MIKE SANTIAGO  Order #:    [id number]                    Reading MD:    Measurements  Intervals                                Axis  Rate:       99                           P:          62  CO:         132                          QRS:        33  QRSD:       86                           T:          18  QT:         341  QTc:        438    Interpretive Statements  Sinus rhythm  Probable left atrial enlargement  Baseline wander in lead(s) II,V6  No previous ECG available for comparison       Stress echo 8/27/19  Good functional capacity.  negative stress echo.    1. Essential hypertension  REFERRAL TO PHARMACOTHERAPY SERVICE    Icosapent Ethyl (VASCEPA) 1 g Cap    Basic Metabolic Panel    Lipid Profile   2. Dyslipidemia  REFERRAL TO PHARMACOTHERAPY SERVICE    Icosapent Ethyl (VASCEPA) 1 g Cap    Basic Metabolic Panel    Lipid Profile   3. Type 2 diabetes mellitus without complication, without long-term current use of insulin (HCC)  REFERRAL TO PHARMACOTHERAPY SERVICE    Icosapent Ethyl (VASCEPA) 1 g Cap    Dapagliflozin Propanediol (FARXIGA) 10 MG Tab    Basic Metabolic Panel    Lipid Profile   4. Metabolic syndrome  REFERRAL TO PHARMACOTHERAPY SERVICE    Icosapent Ethyl (VASCEPA) 1 g Cap    Basic Metabolic Panel    Lipid Profile   5. BMI 36.0-36.9,adult  REFERRAL TO PHARMACOTHERAPY SERVICE    Icosapent Ethyl (VASCEPA) 1 g Cap    Basic Metabolic Panel    Lipid Profile     ASSESSMENT AND PLAN  Patient Type, check all that apply:   Primary Prevention, Type 2 Diabetes Mellitus and Metabolic Syndrome     Established Atherosclerotic Cardiovascular Disease (ASCVD):  None     Other Established (non-atherosclerotic) Vascular Disease, if Present:  None    Evidence of Heterozygous Familial Hypercholesterolemia (FH):   No     ACC/AHA Indication for Statin Therapy, stefano all that apply:  Diabetes aged 40-75: Indication for High intensity statin      Calculated Risk for ASCVD, if applicable     N/A     Other Significant Risk Markers, if any, stefano all that apply   Family history of premature ASCVD in first degree relative and Other: severe elevated triglycerides      1) Elevated trigs - severely elevated, high risk for pancreatitis   - Reduce etoh, weight, fried/fatty foods   - Continue atorvastatin 40mg   - Continue fenofibrate 145mg daily  - Continue pioglitazone 30mg daily   - Trial of Vascepa     National Lipid Association (NLA) Goal  LDL-C:   <100 mg/dL and non-HDL less than 130, not at goal   Trigs <150 not at goal on last lab work   Low HDL    Lifestyle Recommendations From Today’s Visit:    Eating Plan: Concentrate on  Low simple carb   Some exercise every day  Slow steady weight loss-has dropped 7 lbs since last visit    Statin Therapy Recommendations from Today’s Visit:   - Continue atorvastatin 40mg daily     Non-Statin Medications Recommendations from Today’s Visit:   -Continue fenofibrate 145mg daily   - Continue pioglitazone 30mg daily   - Start trial of Vasepa 2 g BID for now until next test     Indication for PCSK9 Inhibitor, if applicable:  Not currently indicated  Supplements Recommended at this visit:   -Continue vitamin D at least 2 g daily    Recommendations for Other Cardiovascular Risk Factors, stefano all that apply:     Blood Pressure Management: Goal: ACC/AHA (2017) goal <130/80  Home BP at goal: yes,  mostly a few highs when stressed while working   Office BP at goal: yes  Echo: normal  ACR: normal   Device candidate? No  Needs FMLA papers for bathroom breaks due to diuretic and Farxiga.   Recommend she follow up with her PCP for this paperwork   Plan:   Monitoring:   - order 24h ABPM:  UNDECIDED, consider if persistent white coat effect   - monitor lytes/gfr routinely   - contact office if BP consistently >140/>90 to discussion of tx adjustments   Medications:  ACEi/ARB: continue losartan 100mg (combo)  DHP-CCB:Consider adding amlodipine 5mg as next options  Thiazide: continue  HCTZ 25mg (combo), consider change to chlorthalidone   Other:   Aldosterone Antagonist: Consider adding spironolactone 25mg as 4th agent   Loop Diuretic: not indicated   Peripheral Alpha Blocker: not indicated   Other CCB: not indicated   Direct Vasodilator: not indicated   Centrally Acting Alpha Agonist: not indicated   Other:   BB: not indicated   DRI: not indicated     T2D: non-insulin, uncontrolled   Did not tolerate metformin due to GI issues.   Last A1c = 7.6, now 10.1  Goal A1c < 7.0  ACR: normal   Stressed importance of close follow up as currently DM poorly controlled  Plan:  - Ref to T2DM clinic-  - Continue farxiga 10mg daily   - Continue januvia 100mg daily was taking 50 mg   - Continue pioglitazone 30mg daily due to high BS and severe trigs   - Hold off on Trial of Trulicity 0.75 mg until after next A1c- declines diabetes education at the present time and as such I think we should hold off on fingersticks.   - Recommmend for routine care with PCP (or endocrine) to include regular A1c monitoring, annual albumin/creatinine ratio (ACR), annual diabetic retinopathy screening, foot exams, annual flu vaccine, and updates to pneumonia vaccines as appropriate     Studies Ordered at Todays Visit: none   Blood Work Ordered At Today’s visit: as above   Follow-Up: DM clinic next week (bring all meds), 3 months with [facility] med    BLAIR Escobar

## 2021-07-19 RX ORDER — DAPAGLIFLOZIN 10 MG/1
10 TABLET, FILM COATED ORAL DAILY
Qty: 90 TABLET | Refills: 3 | Status: SHIPPED | OUTPATIENT
Start: 2021-07-19 | End: 2022-09-13

## 2021-07-19 RX ORDER — ICOSAPENT ETHYL 1000 MG/1
2 CAPSULE ORAL
Qty: 360 CAPSULE | Refills: 3 | Status: SHIPPED | OUTPATIENT
Start: 2021-07-19 | End: 2021-09-08 | Stop reason: SDUPTHER

## 2021-07-20 ENCOUNTER — DOCUMENTATION (OUTPATIENT)
Dept: VASCULAR LAB | Facility: MEDICAL CENTER | Age: 41
End: 2021-07-20

## 2021-07-20 NOTE — PROGRESS NOTES
I Left a voice message with Gladys Lombardi to call the [facility] to establish care.     PCP:  Ana Lilia Pollock M.D.  [address] [address]    [facility]  Phone [phone] fax [phone]

## 2021-07-23 ENCOUNTER — NON-PROVIDER VISIT (OUTPATIENT)
Dept: VASCULAR LAB | Facility: MEDICAL CENTER | Age: 41
End: 2021-07-23
Attending: INTERNAL MEDICINE
Payer: COMMERCIAL

## 2021-07-23 PROCEDURE — 99213 OFFICE O/P EST LOW 20 MIN: CPT

## 2021-07-23 NOTE — NON-PROVIDER
New Patient Consult Note  Primary care physician: Ana Lilia Pollock M.D.    Reason for consult: Management of Uncontrolled Type 2 Diabetes    HPI:  Gladys Lombardi is a 40 y.o. old patient who comes in today for evaluation of above stated problem.    Most Recent HbA1c:   Lab Results   Component Value Date/Time    HBA1C 10.1 (H) 06/30/2021 08:35 AM        Current Diabetes Regimen:  SGLT-2 Inhibitor:  Dapagliflozin 10 mg once daily   Januvia 100 mg   Pioglitazone     Does not test  Hypoglycemia:  None    ROS:  Constitutional: No weight loss  Cardiac: No palpitations or racing heart  Resp: No shortness of breath  Neuro: No numbness or tinging in feet  Endo: No heat or cold intolerance, no polyuria or polydipsia  All other systems were reviewed and were negative.    Past Medical History:  Patient Active Problem List    Diagnosis Date Noted   • BMI 36.0-36.9,adult 12/27/2019   • Hypertriglyceridemia 12/27/2019   • Metabolic syndrome 12/27/2019   • Essential hypertension 02/21/2019   • Dyslipidemia 02/21/2019   • Type 2 diabetes mellitus without complication, without long-term current use of insulin (HCC) 02/21/2019   • Other chest pain 02/21/2019   • MERINO (dyspnea on exertion) 02/21/2019       Past Surgical History:  No past surgical history on file.    Allergies:  Patient has no known allergies.    Social History:  Social History     Socioeconomic History   • Marital status: Single     Spouse name: Not on file   • Number of children: Not on file   • Years of education: Not on file   • Highest education level: Not on file   Occupational History   • Not on file   Tobacco Use   • Smoking status: Current Some Day Smoker     Packs/day: 0.00   • Smokeless tobacco: Never Used   Substance and Sexual Activity   • Alcohol use: Yes     Alcohol/week: 6.0 oz     Types: 10 Standard drinks or equivalent per week   • Drug use: Yes     Types: Inhaled     Comment: latoya   • Sexual activity: Not on file   Other Topics  Concern   • Not on file   Social History Narrative   • Not on file     Social Determinants of Health     Financial Resource Strain:    • Difficulty of Paying Living Expenses:    Food Insecurity:    • Worried About Running Out of Food in the Last Year:    • Ran Out of Food in the Last Year:    Transportation Needs:    • Lack of Transportation (Medical):    • Lack of Transportation (Non-Medical):    Physical Activity:    • Days of Exercise per Week:    • Minutes of Exercise per Session:    Stress:    • Feeling of Stress :    Social Connections:    • Frequency of Communication with Friends and Family:    • Frequency of Social Gatherings with Friends and Family:    • Attends Buddhism Services:    • Active Member of Clubs or Organizations:    • Attends Club or Organization Meetings:    • Marital Status:    Intimate Partner Violence:    • Fear of Current or Ex-Partner:    • Emotionally Abused:    • Physically Abused:    • Sexually Abused:        Family History:  No family history on file.    Medications:    Current Outpatient Medications:   •  Icosapent Ethyl (VASCEPA) 1 g Cap, Take 2 Tablets by mouth 2 (two) times a day., Disp: 360 capsule, Rfl: 3  •  Dapagliflozin Propanediol (FARXIGA) 10 MG Tab, Take 10 mg by mouth every day., Disp: 90 tablet, Rfl: 3  •  SITagliptin (JANUVIA) 50 MG Tab, Take 2 Tablets by mouth every day., Disp: 90 tablet, Rfl: 3  •  fenofibrate (TRICOR) 145 MG Tab, Take 1 tablet by mouth every day., Disp: 90 tablet, Rfl: 3  •  losartan-hydrochlorothiazide (HYZAAR) 100-25 MG per tablet, Take 1 tablet by mouth every day., Disp: 90 tablet, Rfl: 3  •  atorvastatin (LIPITOR) 40 MG Tab, Take 1.5 tabs daily in the evening, Disp: 135 tablet, Rfl: 3  •  ALBUTEROL INH, Inhale  by mouth., Disp: , Rfl:     Labs: Reviewed    Physical Examination:  Vital signs: There were no vitals taken for this visit. There is no height or weight on file to calculate BMI.  General: No apparent distress, cooperative  Eyes: No  scleral icterus or discharge  ENMT: Normal on external inspection of nose, lips, normal thyroid exam  Neck: No abnormal masses on inspection  Resp: Normal effort, clear to auscultation bilaterally   CVS: Regular rate and rhythm, S1 S2 normal, no murmur   Extremities: No edema  Abdomen: abdominal obesity present  Neuro: Alert and oriented  Skin: No rash  Psych: Normal mood and affect, intact memory and able to make informed decisions    Assessment and Plan:    Patient was seen today for a follow up on DM II. Patient has not been testing her BG neither does she know her BG goals.   Patient is currently on Januvia 100 mg, Farxiga 10 mg and pioglitazone for DM II, she is unsure what pioglitazone dose.  Patient is not on metformin due to a hx of GI intolerance in the past, she is unwilling to re-challenge at this time.  Today we discussed BG goals, foods rich in carbs and importance of physical activity. Patient does not currently have a diet plan, she states that she loves carbs but is willing to decrease the amounts she eats. She has an elliptical at home which she does not use. She is willing to start exercising daily, she was advised to exercise 30 mins daily for 5 days a week.    Patient will benefit from a GLP-1 agonist, patient was provided with Trulicity 0.75 mg once weekly samples for 4 weeks, she is to return to clinic in 4 weeks. If tolerated then this will be increased to 1.5 mg once weekly.  -Stop Januvia    Patient will like to be seen on same day as vascular specialist visit due to her work schedule.    Taco Brennan, PharmD  07/23/21    CC:   Ana Lilia Pollock M.D.

## 2021-08-30 ENCOUNTER — APPOINTMENT (OUTPATIENT)
Dept: VASCULAR LAB | Facility: MEDICAL CENTER | Age: 41
End: 2021-08-30
Attending: INTERNAL MEDICINE
Payer: COMMERCIAL

## 2021-08-30 ENCOUNTER — APPOINTMENT (OUTPATIENT)
Dept: VASCULAR LAB | Facility: MEDICAL CENTER | Age: 41
End: 2021-08-30
Payer: COMMERCIAL

## 2021-09-08 ENCOUNTER — OFFICE VISIT (OUTPATIENT)
Dept: VASCULAR LAB | Facility: MEDICAL CENTER | Age: 41
End: 2021-09-08
Attending: INTERNAL MEDICINE
Payer: COMMERCIAL

## 2021-09-08 VITALS
HEART RATE: 93 BPM | SYSTOLIC BLOOD PRESSURE: 113 MMHG | DIASTOLIC BLOOD PRESSURE: 81 MMHG | BODY MASS INDEX: 37.35 KG/M2 | WEIGHT: 238 LBS | HEIGHT: 67 IN

## 2021-09-08 DIAGNOSIS — E78.5 DYSLIPIDEMIA: ICD-10-CM

## 2021-09-08 DIAGNOSIS — E88.810 METABOLIC SYNDROME: ICD-10-CM

## 2021-09-08 DIAGNOSIS — E11.9 TYPE 2 DIABETES MELLITUS WITHOUT COMPLICATION, WITHOUT LONG-TERM CURRENT USE OF INSULIN (HCC): ICD-10-CM

## 2021-09-08 DIAGNOSIS — E78.1 HYPERTRIGLYCERIDEMIA: ICD-10-CM

## 2021-09-08 DIAGNOSIS — I10 ESSENTIAL HYPERTENSION: ICD-10-CM

## 2021-09-08 PROCEDURE — 99212 OFFICE O/P EST SF 10 MIN: CPT

## 2021-09-08 PROCEDURE — 99215 OFFICE O/P EST HI 40 MIN: CPT | Performed by: NURSE PRACTITIONER

## 2021-09-08 RX ORDER — BUPROPION HYDROCHLORIDE 150 MG/1
150 TABLET ORAL
COMMUNITY
Start: 2021-08-27

## 2021-09-08 RX ORDER — ICOSAPENT ETHYL 1000 MG/1
2 CAPSULE ORAL
Qty: 360 CAPSULE | Refills: 3 | Status: SHIPPED | OUTPATIENT
Start: 2021-09-08 | End: 2021-09-20 | Stop reason: SDUPTHER

## 2021-09-08 ASSESSMENT — ENCOUNTER SYMPTOMS
SHORTNESS OF BREATH: 1
NAUSEA: 1
HEADACHES: 0
NERVOUS/ANXIOUS: 1
DIZZINESS: 0
PALPITATIONS: 0
BLURRED VISION: 1

## 2021-09-08 NOTE — PROGRESS NOTES
[facility] - Follow-Up   Date of Service: 09/08/2021    Gladys Lombardi has been referred for evaluation and management of dyslipidemia    Referral Source: PCP    HPI  Needs FMLA paperwork completed    HTN:  Current HTN concerns: No specific concerns since last visit   ADRs: No  Recent studies/labs completed (reviewed with patient, noted below): yes  HTN sx:  No current blurred or changed vision, chest pain, shortness of breath, headache, nausea, dizziness/vertigo   Home BP log: does not check at home   24h ABPM completed: not tested   Adherence to current HTN meds: compliant all of the time     Anticoagulation/antiplats:   No    T2D:  Mild nausea since starting Trulicity  Seeing DM pharmacist in a couple weeks    HLD:   Taking atorvastatin and fenofibrate   Did not start Vascepa yet    History of ASCVD: No  Age at Initial Diagnosis: Oct 2018 - high triglycerides    Current Prescription Lipid Lowering Medications - including dose:   Statin: Atorvastatin  Non-Statin: Fenofibrate  Current Lipid Lowering and Related Supplements:   None  Any Current Side Effects Potentially Related to Lipid Lowering therapy?   No  Current Adherence to Lipid Lowering Therapies   Complete  Previously Attempted Interventions for Lipids - including outcome  Statin: None    Outcome: N/A  Non-Statin: None   Outcome: N/A  Any Previous History of Statin Intolerance?   No  Baseline Lipids Prior to Treatment:   Trigs >2000  Other Pertinent History:   No pancreatitis  No known thyroid disease  No liver or kidney issues  No pregnancies or kids  Not currently sexual active  She does have a history of intermittent chest pain.  She says it is worse with exercise.  She also complains of dyspnea on exertion but has not experience this lately.    CURRENT MEDICATIONS:   Current Outpatient Medications:   •  buPROPion, 150 mg, Oral, QDAY, Taking  •  Icosapent Ethyl, 2 Tablet, Oral, BID  •  Farxiga, 10 mg, Oral, DAILY, Taking  •  fenofibrate, 145  "mg, Oral, DAILY, Taking  •  losartan-hydrochlorothiazide, 1 Tablet, Oral, DAILY, Taking  •  atorvastatin, Take 1.5 tabs daily in the evening, Taking  •  ALBUTEROL INH, Inhale  by mouth., Taking     ALLERGIES: Patient has no known allergies.     SOCIAL HISTORY   Social History     Tobacco Use   Smoking Status Current Some Day Smoker   • Packs/day: 0.00   Smokeless Tobacco Never Used   Had quit smoking, but now back to smoking a few a week    Change in weight: decreased   BMI Readings from Last 5 Encounters:   09/08/21 37.28 kg/m²   09/23/20 39.38 kg/m²   03/18/20 35.05 kg/m²   12/27/19 36.15 kg/m²   02/21/19 40.02 kg/m²      Exercise habits: limited by back pain; starting with stretching and yoga-goal to loose 10 lbs in 3 months, genet system   Diet:Gained 21 lbs since last visit - low carbohydrate, diabetic going to do a diet with her friend    Review of Systems   Eyes: Positive for blurred vision.   Respiratory: Positive for shortness of breath.    Cardiovascular: Negative for chest pain, palpitations and leg swelling.   Gastrointestinal: Positive for nausea.   Neurological: Negative for dizziness and headaches.   Psychiatric/Behavioral: The patient is nervous/anxious.      Vitals:    09/08/21 0825 09/08/21 0828   BP: 121/83 113/81   BP Location: Left arm Left arm   Patient Position: Sitting Sitting   BP Cuff Size: Adult Large adult   Pulse: (!) 101 93   Weight: 108 kg (238 lb)    Height: 1.702 m (5' 7\")      BP Readings from Last 5 Encounters:   09/08/21 113/81   09/23/20 124/86   03/18/20 122/82   12/27/19 137/89   02/21/19 129/86      Physical Exam  Constitutional:       Appearance: Normal appearance.   HENT:      Head: Normocephalic and atraumatic.   Cardiovascular:      Rate and Rhythm: Normal rate.   Pulmonary:      Effort: Pulmonary effort is normal.   Musculoskeletal:         General: Normal range of motion.      Cervical back: Normal range of motion.   Skin:     General: Skin is warm and dry. "   Neurological:      Mental Status: She is alert. Mental status is at baseline.   Psychiatric:         Mood and Affect: Mood normal.         Behavior: Behavior normal.       DATA REVIEW:    Other Pertinent Blood Work:   Lab Results   Component Value Date    SODIUM 129 (L) 2021    POTASSIUM 4.0 2021    CHLORIDE 93 (L) 2021    CO2 17 (L) 2021    GLUCOSE 217 (H) 2021    BUN 13 2021    CREATININE 0.65 2021    CALCIUM 8.9 2021    ASTSGOT 16 2021    ALTSGPT 12 2021    ALKPHOSPHAT 68 2021    TBILIRUBIN 0.4 2021    ALBUMIN 4.1 2021    AGRATIO 1.8 2021     Blood work from 2018  A1c 8.4  GFR 99  Albumin creatinine ratio in the urine normal  Total cholesterol 360  Triglycerides 2164  HDL 21    Stress echo 19  Good functional capacity.  negative stress echo     Lab Results   Component Value Date/Time    CHOLSTRLTOT 221 (H) 09/15/2020 12:13 PM    LDL 96 2020 11:10 AM    HDL 40 09/15/2020 12:13 PM    TRIGLYCERIDE 262 (H) 09/15/2020 12:13 PM     Results for MARTIN CORTEZ (MRN [id number]) as of 2019 13:14   Ref. Range 2019 08:51   Glycohemoglobin Latest Ref Range: 4.8 - 5.6 % 9.9 (H)     VASCULAR IMAGING:     Last EKG:   Results for orders placed or performed during the hospital encounter of 18   EKG (NOW)   Result Value Ref Range    Report       [facility]    Test Date:  2018  Pt Name:    MARTIN CORTEZ             Department: [facility]  MRN:        [id number]                      Room:       -ROOM 5  Gender:     Female                       Technician: HRS  :        1980                   Requested By:MIKE SANTIAGO  Order #:    [id number]                    Reading MD:    Measurements  Intervals                                Axis  Rate:       99                           P:          62  UT:         132                          QRS:        33  QRSD:       86                            T:          18  QT:         341  QTc:        438    Interpretive Statements  Sinus rhythm  Probable left atrial enlargement  Baseline wander in lead(s) II,V6  No previous ECG available for comparison       Stress echo 8/27/19  Good functional capacity.  negative stress echo.    1. Essential hypertension  Icosapent Ethyl (VASCEPA) 1 g Cap   2. Dyslipidemia  Icosapent Ethyl (VASCEPA) 1 g Cap   3. Type 2 diabetes mellitus without complication, without long-term current use of insulin (HCC)  Icosapent Ethyl (VASCEPA) 1 g Cap   4. Metabolic syndrome  Icosapent Ethyl (VASCEPA) 1 g Cap   5. BMI 36.0-36.9,adult  Icosapent Ethyl (VASCEPA) 1 g Cap   6. Hypertriglyceridemia       ASSESSMENT AND PLAN  Patient Type, check all that apply:   Primary Prevention, Type 2 Diabetes Mellitus and Metabolic Syndrome     Established Atherosclerotic Cardiovascular Disease (ASCVD):  None     Other Established (non-atherosclerotic) Vascular Disease, if Present:  None    Evidence of Heterozygous Familial Hypercholesterolemia (FH):   No     ACC/AHA Indication for Statin Therapy, stefano all that apply:  Diabetes aged 40-75: Indication for High intensity statin      Calculated Risk for ASCVD, if applicable    N/A     Other Significant Risk Markers, if any, stefano all that apply   Family history of premature ASCVD in first degree relative and Other: severe elevated triglycerides      1) Elevated trigs - severely elevated, high risk for pancreatitis   - Reduce etoh, weight, fried/fatty foods   - Continue atorvastatin 40mg   - Continue fenofibrate 145mg daily  - Continue pioglitazone 30mg daily   - Trial of Vascepa (ordered to [facility])    National Lipid Association (NLA) Goal  LDL-C:   <100 mg/dL and non-HDL less than 130, not at goal   Trigs <150 not at goal on last lab work   Low HDL  - Repeat labs     Lifestyle Recommendations From Today’s Visit:    Eating Plan: Concentrate on  Low simple carb   Some exercise every  day  Slow steady weight loss-has dropped 6 lbs since last visit    Statin Therapy Recommendations from Today’s Visit:   - Continue atorvastatin 40mg daily     Non-Statin Medications Recommendations from Today’s Visit:   -Continue fenofibrate 145mg daily   - Continue pioglitazone 30mg daily   - Start trial of Vasepa 2 g BID (given samples to pt today- ordered to [facility] pharmacy)     Indication for PCSK9 Inhibitor, if applicable:  Not currently indicated  Supplements Recommended at this visit:   -Continue vitamin D at least 2 g daily    Recommendations for Other Cardiovascular Risk Factors, stefano all that apply:     Blood Pressure Management: Goal: ACC/AHA (2017) goal <130/80  Home BP at goal: yes,  mostly a few highs when stressed while working   Office BP at goal: yes  Echo: normal  ACR: normal   Device candidate? No  Needs FMLA paperwork for uncontrolled T2DM regular appts-- filled out today- copy in media  Plan:   Monitoring:   - order 24h ABPM:  UNDECIDED, consider if persistent white coat effect   - monitor lytes/gfr routinely   - contact office if BP consistently >140/>90 to discussion of tx adjustments   Medications:  ACEi/ARB: continue losartan 100mg (combo)  DHP-CCB: Consider adding amlodipine 5mg as next options  Thiazide: continue HCTZ 25mg (combo), consider change to chlorthalidone   Other:   Aldosterone Antagonist: Consider adding spironolactone 25mg as 4th agent   Loop Diuretic: not indicated   Peripheral Alpha Blocker: not indicated   Other CCB: not indicated   Direct Vasodilator: not indicated   Centrally Acting Alpha Agonist: not indicated   Other:   BB: not indicated   DRI: not indicated     T2D: non-insulin, uncontrolled   Did not tolerate metformin due to GI issues.   Last A1c = 7.6, now 10.1  Goal A1c < 7.0  ACR: normal   Stressed importance of close follow up as currently DM poorly controlled  Plan:  - Continue follow up with DM clinic for management (appt on 9/23)  - Continue meds as rec  -  Continue trial of Trulicity-- need to check on cost for pt, may qualify for assistance program  - Recommmend for routine care with PCP (or endocrine) to include regular A1c monitoring, annual albumin/creatinine ratio (ACR), annual diabetic retinopathy screening, foot exams, annual flu vaccine, and updates to pneumonia vaccines as appropriate     Studies Ordered at Todays Visit: none   Blood Work Ordered At Today’s visit: as previously ordered  Follow-Up: DM clinic in 2 weeks; over phone to discuss lab results in 1 month     Total time: 40-54min - chart review/prep, review of other providers' records, imaging/lab review, face-to-face time for history/examination, ordering, prescribing,  review of results/meds/ treatment plan with patient/family/caregiver, documentation in EMR, care coordination (as needed)    Filled out extensive FMLA paperwork during visit today (in media)     DEN Escobar.

## 2021-09-13 ENCOUNTER — NON-PROVIDER VISIT (OUTPATIENT)
Dept: VASCULAR LAB | Facility: MEDICAL CENTER | Age: 41
End: 2021-09-13
Attending: INTERNAL MEDICINE
Payer: COMMERCIAL

## 2021-09-13 VITALS — HEART RATE: 93 BPM | DIASTOLIC BLOOD PRESSURE: 91 MMHG | SYSTOLIC BLOOD PRESSURE: 123 MMHG

## 2021-09-13 DIAGNOSIS — E11.9 TYPE 2 DIABETES MELLITUS WITHOUT COMPLICATION, WITHOUT LONG-TERM CURRENT USE OF INSULIN (HCC): ICD-10-CM

## 2021-09-13 PROCEDURE — 99212 OFFICE O/P EST SF 10 MIN: CPT

## 2021-09-13 RX ORDER — DULAGLUTIDE 1.5 MG/.5ML
1 INJECTION, SOLUTION SUBCUTANEOUS
Qty: 2 ML | Refills: 3 | Status: SHIPPED | OUTPATIENT
Start: 2021-09-13 | End: 2021-09-20 | Stop reason: SDUPTHER

## 2021-09-13 NOTE — Clinical Note
Follow up DM management. Patient stopped taking Trulicity for ~1 month (ran out) and will be starting again at 0.75mg before titrating to 1.5mg in 1 month.

## 2021-09-13 NOTE — PROGRESS NOTES
Patient Consult Note    Primary care physician: Ana Lilia Pollock M.D.    Reason for consult: Management of Uncontrolled Type 2 Diabetes    HPI:  Gladys Lombardi is a 40 y.o. old patient who comes in today for evaluation of above stated problem.    Most Recent HbA1c:   Lab Results   Component Value Date/Time    HBA1C 10.1 (H) 06/30/2021 08:35 AM      Lab Results   Component Value Date/Time    CREATININE 0.65 06/30/2021 08:35 AM    CREATININE 0.69 04/14/2018 02:01 AM              Diabetes Medication History and Current Regimen  GLP-1 Agent: Dulaglutide 0.75 mg once weekly   SGLT-2 Inhibitor:  Dapagliflozin 10 mg once daily     Pt has home glucometer and proper testing technique - Yes    Pt reports blood sugars: preprandial: 169, 180; postprandial (2-3h): 229, 241  Testing is sporadic; patient has trouble remembering to test. Encouraged patient to set up a routine and test more regularly.    Hypoglycemia awareness - Yes  Nocturnal hypoglycemia- No  Hypoglycemia:  None. Patient has had anxiety attacks that mimic hypoglycemic symptoms (tachycardia, hunger, anxiousness). Encouraged patient to test BG at this point if able to differentiate between anxiety attack and hypoglycemia.     Pt's treatment of Hypoglycemia - 15:15 Rule    Current Exercise - limited by back pain. Patient recently has started spending 5 minutes on the elliptical once weekly.   Exercise Goal - increase exercise as tolerated; will try ellipitical 2 times weekly     Dietary -   Pt reports eating: diet soda, 1 cup of juice, fast food x1 weekly    Breakfast - frozen sausage biscuit (small portions)  Lunch - frozen meal (lean cuisine), top ramen sometimes   Dinner - potstickers, sticky rice, frozen meal   Snack - mini snack packs, pretzels and cheese    Foot Exam:  Monofilament exam - 9/13/2021  Monofilament testing with a 10 gram force: sensation intact: decreased bilaterally.    Visual Inspection: Feet without maceration, ulcers, fissures.   Feet dry.  Pedal pulses: intact bilaterally    Preventative Management  BP regimen (ACE/ARB) - losartan 100mg  ASA - not taking  Statin - not taking  Last Retinal Scan - 6/2021  Last Foot Exam - 9/13/2021  Last A1c -   Lab Results   Component Value Date/Time    HBA1C 10.1 (H) 06/30/2021 08:35 AM      Last Microalbuminuria - never completed; ordered to be completed prior to next appointment    Updated caregaps    Past Medical History:  Patient Active Problem List    Diagnosis Date Noted   • BMI 36.0-36.9,adult 12/27/2019   • Hypertriglyceridemia 12/27/2019   • Metabolic syndrome 12/27/2019   • Essential hypertension 02/21/2019   • Dyslipidemia 02/21/2019   • Type 2 diabetes mellitus without complication, without long-term current use of insulin (HCC) 02/21/2019   • Other chest pain 02/21/2019   • MERINO (dyspnea on exertion) 02/21/2019       Past Surgical History:  No past surgical history on file.    Allergies:  Patient has no known allergies.    Social History:  Social History     Socioeconomic History   • Marital status: Single     Spouse name: Not on file   • Number of children: Not on file   • Years of education: Not on file   • Highest education level: Not on file   Occupational History   • Not on file   Tobacco Use   • Smoking status: Current Some Day Smoker     Packs/day: 0.00   • Smokeless tobacco: Never Used   Substance and Sexual Activity   • Alcohol use: Yes     Alcohol/week: 6.0 oz     Types: 10 Standard drinks or equivalent per week   • Drug use: Yes     Types: Inhaled     Comment: marijuanna   • Sexual activity: Not on file   Other Topics Concern   • Not on file   Social History Narrative   • Not on file     Social Determinants of Health     Financial Resource Strain:    • Difficulty of Paying Living Expenses:    Food Insecurity:    • Worried About Running Out of Food in the Last Year:    • Ran Out of Food in the Last Year:    Transportation Needs:    • Lack of Transportation (Medical):    • Lack of  Transportation (Non-Medical):    Physical Activity:    • Days of Exercise per Week:    • Minutes of Exercise per Session:    Stress:    • Feeling of Stress :    Social Connections:    • Frequency of Communication with Friends and Family:    • Frequency of Social Gatherings with Friends and Family:    • Attends Gnosticist Services:    • Active Member of Clubs or Organizations:    • Attends Club or Organization Meetings:    • Marital Status:    Intimate Partner Violence:    • Fear of Current or Ex-Partner:    • Emotionally Abused:    • Physically Abused:    • Sexually Abused:        Family History:  No family history on file.    Medications:    Current Outpatient Medications:   •  buPROPion (WELLBUTRIN XL) 150 MG XL tablet, Take 150 mg by mouth every day., Disp: , Rfl:   •  Icosapent Ethyl (VASCEPA) 1 g Cap, Take 2 Tablets by mouth 2 (two) times a day., Disp: 360 Capsule, Rfl: 3  •  Dapagliflozin Propanediol (FARXIGA) 10 MG Tab, Take 10 mg by mouth every day., Disp: 90 tablet, Rfl: 3  •  fenofibrate (TRICOR) 145 MG Tab, Take 1 tablet by mouth every day., Disp: 90 tablet, Rfl: 3  •  losartan-hydrochlorothiazide (HYZAAR) 100-25 MG per tablet, Take 1 tablet by mouth every day., Disp: 90 tablet, Rfl: 3  •  atorvastatin (LIPITOR) 40 MG Tab, Take 1.5 tabs daily in the evening, Disp: 135 tablet, Rfl: 3  •  ALBUTEROL INH, Inhale  by mouth., Disp: , Rfl:     Labs: Reviewed    Physical Examination:  Vital signs: /91   Pulse 93  There is no height or weight on file to calculate BMI.    Assessment and Plan:    1. DM2  - A1c remains above goal. Will need to re-test at next appointment.   - Patient started taking Trulicity 0.75mg at most recent appointment (July 2021) but has not been off medication for about 1 month. Reported some nausea with 0.75mg dose. Counseled patient that this is an expected ADR and patient is willing to re-trial 0.75mg dose with increase to 1.5mg weekly in 4 weeks.  - Discussed lifestyle modification  at length. Patient is motivated to make changes and will start increasing vegetable intake with improved portion control. Counseled patient on the plate method. Exercise is extremely limited by pain, but patient has recently purchased an elliptical machine. Currently doing 5 minutes of elliptical exercise once weekly, but is willing to try for 2 times weekly.       - Medication changes - RESTART Trulicity 0.75mg once weekly (samples provided) and INCREASE to 1.5mg once weekly after one month.      - Lifestyle changes - portion control, increase vegetable intake. Will increase exercise to 5 minutes on the elliptical twice weekly.    Follow up in 8 weeks.    Natividad Holm, PharmD  PGY1 Pharmacy Practice Resident  Gold Felix, DavidD   09/13/21    CC:   KEMI Kruger MD Jason Dent, PharmD

## 2021-09-20 DIAGNOSIS — E78.1 HYPERTRIGLYCERIDEMIA: ICD-10-CM

## 2021-09-20 DIAGNOSIS — E88.810 METABOLIC SYNDROME: ICD-10-CM

## 2021-09-20 DIAGNOSIS — E78.5 DYSLIPIDEMIA: ICD-10-CM

## 2021-09-20 DIAGNOSIS — I10 ESSENTIAL HYPERTENSION: ICD-10-CM

## 2021-09-20 DIAGNOSIS — E11.9 TYPE 2 DIABETES MELLITUS WITHOUT COMPLICATION, WITHOUT LONG-TERM CURRENT USE OF INSULIN (HCC): ICD-10-CM

## 2021-09-20 RX ORDER — DULAGLUTIDE 1.5 MG/.5ML
1 INJECTION, SOLUTION SUBCUTANEOUS
Qty: 2 ML | Refills: 3 | Status: SHIPPED | OUTPATIENT
Start: 2021-09-20 | End: 2022-01-04

## 2021-09-20 RX ORDER — LOSARTAN POTASSIUM AND HYDROCHLOROTHIAZIDE 25; 100 MG/1; MG/1
1 TABLET ORAL DAILY
Qty: 90 TABLET | Refills: 3 | Status: SHIPPED | OUTPATIENT
Start: 2021-09-20 | End: 2022-09-13

## 2021-09-20 RX ORDER — ICOSAPENT ETHYL 1000 MG/1
2 CAPSULE ORAL
Qty: 360 CAPSULE | Refills: 3 | Status: SHIPPED | OUTPATIENT
Start: 2021-09-20 | End: 2021-10-13 | Stop reason: SDUPTHER

## 2021-09-20 RX ORDER — FENOFIBRATE 145 MG/1
145 TABLET, COATED ORAL DAILY
Qty: 90 TABLET | Refills: 3 | Status: SHIPPED | OUTPATIENT
Start: 2021-09-20 | End: 2022-09-13

## 2021-09-20 RX ORDER — ATORVASTATIN CALCIUM 40 MG/1
TABLET, FILM COATED ORAL
Qty: 135 TABLET | Refills: 3 | Status: SHIPPED | OUTPATIENT
Start: 2021-09-20 | End: 2023-03-09 | Stop reason: SDUPTHER

## 2021-09-21 ENCOUNTER — DOCUMENTATION (OUTPATIENT)
Dept: VASCULAR LAB | Facility: MEDICAL CENTER | Age: 41
End: 2021-09-21

## 2021-09-21 NOTE — PROGRESS NOTES
Process test claim in [place]. Refill Too Soon. Releasing to the pharmacy on file.     NEXT AVAILABLE FILL DATE [id number]  LAST FILL DT [id number] FILLED AT PHARMACY  [facility],PHONE #[phone]  [phone]

## 2021-10-09 LAB
BUN SERPL-MCNC: 14 MG/DL (ref 6–24)
BUN/CREAT SERPL: 17 (ref 9–23)
CALCIUM SERPL-MCNC: 10.2 MG/DL (ref 8.7–10.2)
CHLORIDE SERPL-SCNC: 100 MMOL/L (ref 96–106)
CHOLEST SERPL-MCNC: 180 MG/DL (ref 100–199)
CO2 SERPL-SCNC: 21 MMOL/L (ref 20–29)
CREAT SERPL-MCNC: 0.81 MG/DL (ref 0.57–1)
GLUCOSE SERPL-MCNC: 191 MG/DL (ref 65–99)
HDLC SERPL-MCNC: 27 MG/DL
LABORATORY COMMENT REPORT: ABNORMAL
LDLC SERPL CALC-MCNC: 53 MG/DL (ref 0–99)
MICROALBUMIN UR-MCNC: <3 UG/ML
POTASSIUM SERPL-SCNC: 4.2 MMOL/L (ref 3.5–5.2)
SODIUM SERPL-SCNC: 137 MMOL/L (ref 134–144)
TRIGL SERPL-MCNC: 681 MG/DL (ref 0–149)
VLDLC SERPL CALC-MCNC: 100 MG/DL (ref 5–40)

## 2021-10-13 ENCOUNTER — OFFICE VISIT (OUTPATIENT)
Dept: VASCULAR LAB | Facility: MEDICAL CENTER | Age: 41
End: 2021-10-13
Attending: INTERNAL MEDICINE
Payer: COMMERCIAL

## 2021-10-13 VITALS
HEIGHT: 67 IN | HEART RATE: 118 BPM | WEIGHT: 232 LBS | BODY MASS INDEX: 36.41 KG/M2 | DIASTOLIC BLOOD PRESSURE: 88 MMHG | SYSTOLIC BLOOD PRESSURE: 129 MMHG

## 2021-10-13 DIAGNOSIS — I10 ESSENTIAL HYPERTENSION: ICD-10-CM

## 2021-10-13 DIAGNOSIS — E78.5 DYSLIPIDEMIA: ICD-10-CM

## 2021-10-13 DIAGNOSIS — E11.9 TYPE 2 DIABETES MELLITUS WITHOUT COMPLICATION, WITHOUT LONG-TERM CURRENT USE OF INSULIN (HCC): ICD-10-CM

## 2021-10-13 DIAGNOSIS — E88.810 METABOLIC SYNDROME: ICD-10-CM

## 2021-10-13 LAB
HBA1C MFR BLD: 8.3 % (ref 0–5.6)
INT CON NEG: ABNORMAL
INT CON POS: ABNORMAL

## 2021-10-13 PROCEDURE — 99214 OFFICE O/P EST MOD 30 MIN: CPT | Performed by: NURSE PRACTITIONER

## 2021-10-13 PROCEDURE — 99212 OFFICE O/P EST SF 10 MIN: CPT

## 2021-10-13 PROCEDURE — 83036 HEMOGLOBIN GLYCOSYLATED A1C: CPT | Performed by: NURSE PRACTITIONER

## 2021-10-13 RX ORDER — ICOSAPENT ETHYL 1000 MG/1
2 CAPSULE ORAL
Qty: 360 CAPSULE | Refills: 3 | Status: SHIPPED | OUTPATIENT
Start: 2021-10-13 | End: 2021-11-03 | Stop reason: SDUPTHER

## 2021-10-13 ASSESSMENT — ENCOUNTER SYMPTOMS
NAUSEA: 1
NERVOUS/ANXIOUS: 1
DIZZINESS: 0
PALPITATIONS: 0
BLURRED VISION: 1
HEADACHES: 0
SHORTNESS OF BREATH: 1

## 2021-10-13 NOTE — PROGRESS NOTES
[facility] - Follow-Up   Date of Service: 10/13/2021    Gladys Lombardi has been referred for evaluation and management of dyslipidemia    Referral Source: PCP    HPI  Needs FMLA paperwork completed    HTN:  Current HTN concerns: No specific concerns since last visit   ADRs: No  Recent studies/labs completed (reviewed with patient, noted below): yes  HTN sx:  No current blurred or changed vision, chest pain, shortness of breath, headache, nausea, dizziness/vertigo   Home BP log: does not check at home   24h ABPM completed: not tested   Adherence to current HTN meds: compliant all of the time     Anticoagulation/antiplats:   No    T2D:  Mild nausea since starting Trulicity, doing ok with it currently   Seeing DM pharmacist in a couple weeks    HLD:   Taking atorvastatin and fenofibrate   Did not start Vascepa yet due to delay at pharmacy     History of ASCVD: No  Age at Initial Diagnosis: Oct 2018 - high triglycerides    Current Prescription Lipid Lowering Medications - including dose:   Statin: Atorvastatin  Non-Statin: Fenofibrate  Current Lipid Lowering and Related Supplements:   None  Any Current Side Effects Potentially Related to Lipid Lowering therapy?   No  Current Adherence to Lipid Lowering Therapies   Complete  Previously Attempted Interventions for Lipids - including outcome  Statin: None    Outcome: N/A  Non-Statin: None   Outcome: N/A  Any Previous History of Statin Intolerance?   No  Baseline Lipids Prior to Treatment:   Trigs >2000  Other Pertinent History:   No pancreatitis  No known thyroid disease  No liver or kidney issues  No pregnancies or kids  Not currently sexual active  She does have a history of intermittent chest pain.  She says it is worse with exercise.  She also complains of dyspnea on exertion but has not experience this lately.    CURRENT MEDICATIONS:   Current Outpatient Medications:   •  Trulicity, 1 PEN, Subcutaneous, Q7 DAYS, Taking  •  Icosapent Ethyl, 2 Tablet, Oral,  "BID, Taking  •  atorvastatin, Take 1.5 tabs daily in the evening, Taking  •  fenofibrate, 145 mg, Oral, DAILY, Taking  •  losartan-hydrochlorothiazide, 1 Tablet, Oral, DAILY, Taking  •  buPROPion, 150 mg, Oral, QDAY, Taking  •  Farxiga, 10 mg, Oral, DAILY, Taking  •  ALBUTEROL INH, Inhale  by mouth., Taking     ALLERGIES: Patient has no known allergies.     SOCIAL HISTORY   Social History     Tobacco Use   Smoking Status Current Some Day Smoker   • Packs/day: 0.00   Smokeless Tobacco Never Used   Had quit smoking, but now back to smoking a few a week    Change in weight: decreased   BMI Readings from Last 5 Encounters:   10/13/21 36.34 kg/m²   09/08/21 37.28 kg/m²   09/23/20 39.38 kg/m²   03/18/20 35.05 kg/m²   12/27/19 36.15 kg/m²      Exercise habits: limited by back pain; starting with stretching and yoga-goal to loose 10 lbs in 3 months, genet system   Diet:Gained 21 lbs since last visit - low carbohydrate, diabetic going to do a diet with her friend    Review of Systems   Eyes: Positive for blurred vision.   Respiratory: Positive for shortness of breath.    Cardiovascular: Negative for chest pain, palpitations and leg swelling.   Gastrointestinal: Positive for nausea.   Neurological: Negative for dizziness and headaches.   Psychiatric/Behavioral: The patient is nervous/anxious.      Vitals:    10/13/21 1507   BP: 129/88   BP Location: Left arm   Patient Position: Sitting   BP Cuff Size: Adult   Pulse: (!) 118   Weight: 105 kg (232 lb)   Height: 1.702 m (5' 7\")     BP Readings from Last 5 Encounters:   10/13/21 129/88   09/13/21 123/91   09/08/21 113/81   09/23/20 124/86   03/18/20 122/82      Physical Exam  Constitutional:       Appearance: Normal appearance.   HENT:      Head: Normocephalic and atraumatic.   Cardiovascular:      Rate and Rhythm: Normal rate and regular rhythm.   Pulmonary:      Effort: Pulmonary effort is normal.   Musculoskeletal:         General: Normal range of motion.      Cervical back: " Normal range of motion.   Skin:     General: Skin is warm and dry.   Neurological:      Mental Status: She is alert. Mental status is at baseline.   Psychiatric:         Mood and Affect: Mood normal.         Behavior: Behavior normal.       DATA REVIEW:    Other Pertinent Blood Work:   Lab Results   Component Value Date    SODIUM 137 10/08/2021    POTASSIUM 4.2 10/08/2021    CHLORIDE 100 10/08/2021    CO2 21 10/08/2021    GLUCOSE 191 (H) 10/08/2021    BUN 14 10/08/2021    CREATININE 0.81 10/08/2021    CALCIUM 10.2 10/08/2021    ASTSGOT 16 2021    ALTSGPT 12 2021    ALKPHOSPHAT 68 2021    TBILIRUBIN 0.4 2021    ALBUMIN 4.1 2021    AGRATIO 1.8 2021     Blood work from 2018  A1c 8.4  GFR 99  Albumin creatinine ratio in the urine normal  Total cholesterol 360  Triglycerides 2164  HDL 21    Stress echo 19  Good functional capacity.  negative stress echo     Lab Results   Component Value Date/Time    CHOLSTRLTOT 180 10/08/2021 07:35 AM    LDL 96 2020 11:10 AM    HDL 27 (L) 10/08/2021 07:35 AM    TRIGLYCERIDE 681 (HH) 10/08/2021 07:35 AM     Results for MARTIN CORTEZ (MRN [id number]) as of 2019 13:14   Ref. Range 2019 08:51   Glycohemoglobin Latest Ref Range: 4.8 - 5.6 % 9.9 (H)     VASCULAR IMAGING:     Last EKG:   Results for orders placed or performed during the hospital encounter of 18   EKG (NOW)   Result Value Ref Range    Report       [facility] Emergency [facility]    Test Date:  2018  Pt Name:    MARTIN CORTEZ             Department: [facility]  MRN:        [id number]                      Room:       -ROOM 5  Gender:     Female                       Technician: HRS  :        1980                   Requested By:MIKE SANTIAGO  Order #:    [id number]                    Reading MD:    Measurements  Intervals                                Axis  Rate:       99                           P:          62  IN:         132                           QRS:        33  QRSD:       86                           T:          18  QT:         341  QTc:        438    Interpretive Statements  Sinus rhythm  Probable left atrial enlargement  Baseline wander in lead(s) II,V6  No previous ECG available for comparison       Stress echo 8/27/19  Good functional capacity.  negative stress echo.    1. Type 2 diabetes mellitus without complication, without long-term current use of insulin (HCC)  POCT Hemoglobin A1C     ASSESSMENT AND PLAN  Patient Type, check all that apply:   Primary Prevention, Type 2 Diabetes Mellitus and Metabolic Syndrome     Established Atherosclerotic Cardiovascular Disease (ASCVD):  None     Other Established (non-atherosclerotic) Vascular Disease, if Present:  None    Evidence of Heterozygous Familial Hypercholesterolemia (FH):   No     ACC/AHA Indication for Statin Therapy, stefano all that apply:  Diabetes aged 40-75: Indication for High intensity statin      Calculated Risk for ASCVD, if applicable    N/A     Other Significant Risk Markers, if any, stefano all that apply   Family history of premature ASCVD in first degree relative and Other: severe elevated triglycerides      1) Elevated trigs - severely elevated, high risk for pancreatitis   Assistant called to determine problem at pharmacy for dispensing Vascepa- apparently there is a delay due to supply issues, but the prescription is approved and will ship when able.  - Continue to reduce etoh, weight, fried/fatty foods   - Continue atorvastatin 40mg   - Continue fenofibrate 145mg daily  - Start Vascepa (given samples today until she is able to get prescription); instructed her to call if needs additional samples     National Lipid Association (NLA) Goal  LDL-C:   <100 mg/dL and non-HDL less than 130, not at goal   Trigs <150 not at goal on last lab work   Low HDL  - Repeat labs after starting Vascepa     Lifestyle Recommendations From Today’s Visit:    Eating Plan: Concentrate on  " Low simple carb   Some exercise every day  Slow steady weight loss-has continued to lose weight!  Continue no ETOH (been alcohol free since July 2021)    Statin Therapy Recommendations from Today’s Visit:   - Continue atorvastatin 40mg daily     Non-Statin Medications Recommendations from Today’s Visit:   - Continue fenofibrate 145mg daily   - Start Vascepa 2g twice daily     Indication for PCSK9 Inhibitor, if applicable:  Not currently indicated  Supplements Recommended at this visit:   -Continue vitamin D at least 2 g daily    Recommendations for Other Cardiovascular Risk Factors, stefano all that apply:     Blood Pressure Management: Goal: ACC/AHA (2017) goal <130/80  Home BP at goal: yes,  mostly a few highs when stressed while working   Office BP at goal: yes  Echo: normal  ACR: normal   Device candidate? No  Needs FMLA paperwork for uncontrolled T2DM regular appts-- filled out today- copy in media  Plan:   Monitoring:   - order 24h ABPM:  UNDECIDED, consider if persistent white coat effect   - monitor lytes/gfr routinely   - contact office if BP consistently >140/>90 to discussion of tx adjustments   Medications:  ACEi/ARB: continue losartan 100mg (combo)  DHP-CCB: Consider adding amlodipine 5mg as next options  Thiazide: continue HCTZ 25mg (combo), consider change to chlorthalidone   Other:   Aldosterone Antagonist: Consider adding spironolactone 25mg as 4th agent   Loop Diuretic: not indicated   Peripheral Alpha Blocker: not indicated   Other CCB: not indicated   Direct Vasodilator: not indicated   Centrally Acting Alpha Agonist: not indicated   Other:   BB: not indicated   DRI: not indicated     T2D: non-insulin, uncontrolled   Did not tolerate metformin due to GI issues.   Continues to have times throughout day where she feels she \"crashes\"  Usually eats something and feels better  Has had continued nausea with Trulicity   Last A1c = 8.3  Goal A1c < 7.0  ACR: normal   Stressed importance of close follow up " as currently DM poorly controlled  Plan:  - Continue follow up with DM clinic for management (appt on 11/1)  - Continue meds as rec  - May be good candidate for continuous glucose monitor (message sent to DM pharmacist Carmen Cosby to consider)   - Recommmend for routine care with PCP (or endocrine) to include regular A1c monitoring, annual albumin/creatinine ratio (ACR), annual diabetic retinopathy screening, foot exams, annual flu vaccine, and updates to pneumonia vaccines as appropriate     Studies Ordered at Todays Visit: none   Blood Work Ordered At Today’s visit: as previously ordered  Follow-Up: 3 months, as planned with DM clinic     Total time: 40-54min - chart review/prep, review of other providers' records, imaging/lab review, face-to-face time for history/examination, ordering, prescribing,  review of results/meds/ treatment plan with patient/family/caregiver, documentation in EMR, care coordination (as needed)    DEN Escobar.

## 2021-10-14 ENCOUNTER — DOCUMENTATION (OUTPATIENT)
Dept: VASCULAR LAB | Facility: MEDICAL CENTER | Age: 41
End: 2021-10-14

## 2021-10-14 NOTE — PROGRESS NOTES
STEPHIE De Los Santos requested I see this patient for follow up diabetes.  Left patient message to see if we could reschedule her to a day that I am in the office.  Preferably a Thursday    Carmen Cosby, Clinical Pharmacist, CDE, CACP

## 2021-11-03 DIAGNOSIS — E11.9 TYPE 2 DIABETES MELLITUS WITHOUT COMPLICATION, WITHOUT LONG-TERM CURRENT USE OF INSULIN (HCC): ICD-10-CM

## 2021-11-03 DIAGNOSIS — I10 ESSENTIAL HYPERTENSION: ICD-10-CM

## 2021-11-03 DIAGNOSIS — E88.810 METABOLIC SYNDROME: ICD-10-CM

## 2021-11-03 DIAGNOSIS — E78.5 DYSLIPIDEMIA: ICD-10-CM

## 2021-11-03 RX ORDER — ICOSAPENT ETHYL 1000 MG/1
2 CAPSULE ORAL
Qty: 360 CAPSULE | Refills: 3 | Status: SHIPPED | OUTPATIENT
Start: 2021-11-03 | End: 2021-11-05 | Stop reason: SDUPTHER

## 2021-11-04 ENCOUNTER — NON-PROVIDER VISIT (OUTPATIENT)
Dept: VASCULAR LAB | Facility: MEDICAL CENTER | Age: 41
End: 2021-11-04
Attending: INTERNAL MEDICINE
Payer: COMMERCIAL

## 2021-11-04 DIAGNOSIS — E11.9 TYPE 2 DIABETES MELLITUS WITHOUT COMPLICATION, WITHOUT LONG-TERM CURRENT USE OF INSULIN (HCC): ICD-10-CM

## 2021-11-04 PROCEDURE — 99212 OFFICE O/P EST SF 10 MIN: CPT

## 2021-11-04 RX ORDER — FLASH GLUCOSE SENSOR
1 KIT MISCELLANEOUS DAILY
Qty: 6 EACH | Refills: 3 | Status: SHIPPED | OUTPATIENT
Start: 2021-11-04

## 2021-11-04 RX ORDER — FLASH GLUCOSE SCANNING READER
1 EACH MISCELLANEOUS DAILY
Qty: 1 EACH | Refills: 0 | Status: SHIPPED | OUTPATIENT
Start: 2021-11-04 | End: 2022-01-03

## 2021-11-04 NOTE — NON-PROVIDER
Patient Consult Note    Primary care physician: Ana Lilia Pollock M.D.    Reason for consult: Management of Uncontrolled Type 2 Diabetes    HPI:  Gladys Lombardi is a 40 y.o. old patient who comes in today for evaluation of above stated problem.    Most Recent HbA1c:   Lab Results   Component Value Date/Time    HBA1C 8.3 (A) 10/13/2021 03:20 PM      Lab Results   Component Value Date/Time    CREATININE 0.81 10/08/2021 07:35 AM    CREATININE 0.69 04/14/2018 02:01 AM        Diabetes Medication History and Current Regimen  GLP-1 Agent: Dulaglutide 1.5 mg once weekly - pt states she increased up from 0.75 ~ 4 weeks ago  SGLT-2 Inhibitor:  Dapagliflozin 10 mg once daily    Pt has home glucometer and proper testing technique - Yes    Pt reports blood sugars:   Before Breakfast: 160's typically. Sometimes 180's    Hypoglycemia awareness - Yes  Nocturnal hypoglycemia- Denies  Hypoglycemia:  None noted  · Pt states she's had panic attacks since her last visit, but did not think to test BG. Low concern for hypoglycemia w/ her reported BG as well as combination of her medications both have a low incidence of hypoglycemia.    Pt's treatment of Hypoglycemia - 15:15 Rule     Current Exercise - Limited w/ back pain. More dedicated walking (20 min 3x/week)  Exercise Goal - Increase as tolerated    Dietary: Since her last visit, increased vegetable intake  Breakfast - frozen sausage biscuit (small portions)  Lunch - frozen meal (lean cuisine), top ramen sometimes   Dinner - potstickers, sticky rice, frozen meal   Snack - mini snack packs, pretzels and cheese  Beverages - Diet soda, water, 1 cup of juice    Foot Exam:  Monofilament exam - Completed 9/2021    Preventative Management  BP regimen (ACE/ARB) - Losartan 100 mg once daily  ASA - N/a  Statin - Atorvastatin 40 mg once daily  Last Retinal Scan - Completed 6/2021  Last Foot Exam - Pt to start checking regularly  Last A1c -   Lab Results   Component Value Date/Time     HBA1C 8.3 (A) 10/13/2021 03:20 PM      Last Microalbuminuria -    Ref. Range 10/8/2021 07:35   Microalbumin, Urine Random Latest Ref Range: Not Estab. ug/mL <3.0       Past Medical History:  Patient Active Problem List    Diagnosis Date Noted   • BMI 36.0-36.9,adult 12/27/2019   • Hypertriglyceridemia 12/27/2019   • Metabolic syndrome 12/27/2019   • Essential hypertension 02/21/2019   • Dyslipidemia 02/21/2019   • Type 2 diabetes mellitus without complication, without long-term current use of insulin (HCC) 02/21/2019   • Other chest pain 02/21/2019   • MERINO (dyspnea on exertion) 02/21/2019       Past Surgical History:  No past surgical history on file.    Allergies:  Patient has no known allergies.    Social History:  Social History     Socioeconomic History   • Marital status: Single     Spouse name: Not on file   • Number of children: Not on file   • Years of education: Not on file   • Highest education level: Not on file   Occupational History   • Not on file   Tobacco Use   • Smoking status: Current Some Day Smoker     Packs/day: 0.00   • Smokeless tobacco: Never Used   Substance and Sexual Activity   • Alcohol use: Yes     Alcohol/week: 6.0 oz     Types: 10 Standard drinks or equivalent per week   • Drug use: Yes     Types: Inhaled     Comment: marijuanna   • Sexual activity: Not on file   Other Topics Concern   • Not on file   Social History Narrative   • Not on file     Social Determinants of Health     Financial Resource Strain:    • Difficulty of Paying Living Expenses:    Food Insecurity:    • Worried About Running Out of Food in the Last Year:    • Ran Out of Food in the Last Year:    Transportation Needs:    • Lack of Transportation (Medical):    • Lack of Transportation (Non-Medical):    Physical Activity:    • Days of Exercise per Week:    • Minutes of Exercise per Session:    Stress:    • Feeling of Stress :    Social Connections:    • Frequency of Communication with Friends and Family:    •  Frequency of Social Gatherings with Friends and Family:    • Attends Christian Services:    • Active Member of Clubs or Organizations:    • Attends Club or Organization Meetings:    • Marital Status:    Intimate Partner Violence:    • Fear of Current or Ex-Partner:    • Emotionally Abused:    • Physically Abused:    • Sexually Abused:        Family History:  No family history on file.    Medications:    Current Outpatient Medications:   •  Icosapent Ethyl (VASCEPA) 1 g Cap, Take 2 Tablets by mouth 2 (two) times a day., Disp: 360 Capsule, Rfl: 3  •  Dulaglutide (TRULICITY) 1.5 MG/0.5ML Solution Pen-injector, Inject 1 PEN under the skin every 7 days., Disp: 2 mL, Rfl: 3  •  atorvastatin (LIPITOR) 40 MG Tab, Take 1.5 tabs daily in the evening, Disp: 135 Tablet, Rfl: 3  •  fenofibrate (TRICOR) 145 MG Tab, Take 1 Tablet by mouth every day., Disp: 90 Tablet, Rfl: 3  •  losartan-hydrochlorothiazide (HYZAAR) 100-25 MG per tablet, Take 1 Tablet by mouth every day., Disp: 90 Tablet, Rfl: 3  •  buPROPion (WELLBUTRIN XL) 150 MG XL tablet, Take 150 mg by mouth every day., Disp: , Rfl:   •  Dapagliflozin Propanediol (FARXIGA) 10 MG Tab, Take 10 mg by mouth every day., Disp: 90 tablet, Rfl: 3  •  ALBUTEROL INH, Inhale  by mouth., Disp: , Rfl:     Labs: Reviewed    Physical Examination:  Vital signs: There were no vitals taken for this visit. There is no height or weight on file to calculate BMI.    Assessment and Plan:    1. DM2  • Pt's A1c decreased from 10.1% (6/30/21) to 8.3% (10/13/21).  • Pt SMFBG are significantly above goal at this point in time w/ no readings near goal range.  • Pt states since increasing her Trulicity up to 1.5 mg once weekly that she's noted a decent amount of nausea. She is able to tolerate the nausea at this point. Counseled pt on nausea mitigation techniques while on GLP1 therapy.  • Pt complains that her work is not very conducive to her needs in regard to feeling nauseous/GI upset. She is unwilling  to retrial metformin at this time d/t this. She's trialed metformin in the past w/ GI upset - unclear if this was ER formulation.  • Pt adamant that she cannot trial a new medication at this point in time d/t work - counseled her on the risks/detriments of prolonged uncontrolled hyperglycemia.  • Of note, pt is interested in CGM - sending in Rx for Freestyle Joey reader and sensor to pt's preferred drug outlet    - Medication changes:  • Continue current therapy - pt would like to defer any changes until her mandatory overtime is over at work (anticipated beginning of 2022)    - Lifestyle changes:  Diet: Continue to maximize lean proteins and veggies. Continue to cut out/down on carbs. Avoid simple sugars.   Exercise: Increase as tolerated    FU: 3 months for updated A1c w/ lifestyle measures and discussion of metformin initiation if not at goal    Guille Milian, PharmD  11/04/21    CC:   Ana Lilia Pollock M.D.

## 2021-11-05 DIAGNOSIS — E11.9 TYPE 2 DIABETES MELLITUS WITHOUT COMPLICATION, WITHOUT LONG-TERM CURRENT USE OF INSULIN (HCC): ICD-10-CM

## 2021-11-05 DIAGNOSIS — I10 ESSENTIAL HYPERTENSION: ICD-10-CM

## 2021-11-05 DIAGNOSIS — E78.5 DYSLIPIDEMIA: ICD-10-CM

## 2021-11-05 DIAGNOSIS — E88.810 METABOLIC SYNDROME: ICD-10-CM

## 2021-11-05 RX ORDER — ICOSAPENT ETHYL 1000 MG/1
2 CAPSULE ORAL
Qty: 360 CAPSULE | Refills: 3 | Status: SHIPPED | OUTPATIENT
Start: 2021-11-05 | End: 2022-06-07 | Stop reason: SDUPTHER

## 2021-12-29 DIAGNOSIS — E11.9 TYPE 2 DIABETES MELLITUS WITHOUT COMPLICATION, WITHOUT LONG-TERM CURRENT USE OF INSULIN (HCC): ICD-10-CM

## 2022-01-03 ENCOUNTER — OFFICE VISIT (OUTPATIENT)
Dept: VASCULAR LAB | Facility: MEDICAL CENTER | Age: 42
End: 2022-01-03
Attending: NURSE PRACTITIONER
Payer: COMMERCIAL

## 2022-01-03 VITALS
DIASTOLIC BLOOD PRESSURE: 76 MMHG | HEIGHT: 67 IN | BODY MASS INDEX: 35.79 KG/M2 | WEIGHT: 228 LBS | SYSTOLIC BLOOD PRESSURE: 111 MMHG | HEART RATE: 101 BPM

## 2022-01-03 DIAGNOSIS — E78.1 HYPERTRIGLYCERIDEMIA: ICD-10-CM

## 2022-01-03 DIAGNOSIS — E78.5 DYSLIPIDEMIA: ICD-10-CM

## 2022-01-03 DIAGNOSIS — E88.810 METABOLIC SYNDROME: ICD-10-CM

## 2022-01-03 DIAGNOSIS — E11.9 TYPE 2 DIABETES MELLITUS WITHOUT COMPLICATION, WITHOUT LONG-TERM CURRENT USE OF INSULIN (HCC): ICD-10-CM

## 2022-01-03 DIAGNOSIS — I10 ESSENTIAL HYPERTENSION: ICD-10-CM

## 2022-01-03 PROCEDURE — 99214 OFFICE O/P EST MOD 30 MIN: CPT | Performed by: NURSE PRACTITIONER

## 2022-01-03 PROCEDURE — 99212 OFFICE O/P EST SF 10 MIN: CPT

## 2022-01-03 RX ORDER — ESOMEPRAZOLE MAGNESIUM 40 MG/1
CAPSULE, DELAYED RELEASE ORAL
COMMUNITY

## 2022-01-03 RX ORDER — GLIMEPIRIDE 2 MG/1
TABLET ORAL
COMMUNITY

## 2022-01-03 RX ORDER — IBUPROFEN 200 MG
TABLET ORAL
COMMUNITY

## 2022-01-03 RX ORDER — BRIMONIDINE TARTRATE 2 MG/ML
SOLUTION/ DROPS OPHTHALMIC
COMMUNITY
Start: 2021-11-27

## 2022-01-03 ASSESSMENT — ENCOUNTER SYMPTOMS
PALPITATIONS: 0
NAUSEA: 1
DIZZINESS: 0
NERVOUS/ANXIOUS: 1
HEADACHES: 1
SHORTNESS OF BREATH: 0
BLURRED VISION: 0
BLOOD IN STOOL: 0

## 2022-01-03 NOTE — PROGRESS NOTES
[facility] - Follow-Up   Date of Service: 1/3/22    Gladys Lombardi has been referred for evaluation and management of dyslipidemia    Referral Source: PCP    HPI  Needs FMLA paperwork completed    HTN:  Current HTN concerns: No specific concerns since last visit   ADRs: No  Recent studies/labs completed (reviewed with patient, noted below): yes  HTN sx:  No current blurred or changed vision, chest pain, shortness of breath, headache, nausea, dizziness/vertigo   Home BP log: does not check at home   24h ABPM completed: not tested   Adherence to current HTN meds: compliant all of the time     Anticoagulation/antiplats:   No    T2D:  Mild nausea since starting Trulicity, doing ok with it currently   Seeing DM pharmacist in a couple weeks    HLD:   Taking atorvastatin and fenofibrate   Did not start Vascepa yet due to delay at pharmacy     History of ASCVD: No  Age at Initial Diagnosis: Oct 2018 - high triglycerides    Current Prescription Lipid Lowering Medications - including dose:   Statin: Atorvastatin  Non-Statin: Fenofibrate  Current Lipid Lowering and Related Supplements:   None  Any Current Side Effects Potentially Related to Lipid Lowering therapy?   No  Current Adherence to Lipid Lowering Therapies   Complete  Previously Attempted Interventions for Lipids - including outcome  Statin: None    Outcome: N/A  Non-Statin: None   Outcome: N/A  Any Previous History of Statin Intolerance?   No  Baseline Lipids Prior to Treatment:   Trigs >2000  Other Pertinent History:   No pancreatitis  No known thyroid disease  No liver or kidney issues  No pregnancies or kids  Not currently sexual active  She does have a history of intermittent chest pain.  She says it is worse with exercise.  She also complains of dyspnea on exertion but has not experience this lately.    CURRENT MEDICATIONS:   Current Outpatient Medications:   •  Icosapent Ethyl, 2 Tablet, Oral, BID  •  FreeStyle Joey 14 Day Sensor, 1 Each, Does not  apply, DAILY  •  FreeStyle Joey 14 Day [place], 1 Each, Does not apply, DAILY  •  Trulicity, 1 PEN, Subcutaneous, Q7 DAYS  •  atorvastatin, Take 1.5 tabs daily in the evening  •  fenofibrate, 145 mg, Oral, DAILY  •  losartan-hydrochlorothiazide, 1 Tablet, Oral, DAILY  •  buPROPion, 150 mg, Oral, QDAY  •  Farxiga, 10 mg, Oral, DAILY  •  ALBUTEROL INH, Inhale  by mouth.     ALLERGIES: Patient has no known allergies.     SOCIAL HISTORY   Social History     Tobacco Use   Smoking Status Current Some Day Smoker   • Packs/day: 0.00   Smokeless Tobacco Never Used   Had quit smoking, but now back to smoking a few a week    Change in weight: decreased   BMI Readings from Last 5 Encounters:   10/13/21 36.34 kg/m²   09/08/21 37.28 kg/m²   09/23/20 39.38 kg/m²   03/18/20 35.05 kg/m²   12/27/19 36.15 kg/m²      Exercise habits: limited by back pain; starting with stretching and yoga-goal to loose 10 lbs in 3 months, genet system   Diet:Gained 21 lbs since last visit - low carbohydrate, diabetic going to do a diet with her friend    Review of Systems   Eyes: Negative for blurred vision.   Respiratory: Negative for shortness of breath.    Cardiovascular: Negative for chest pain, palpitations and leg swelling.   Gastrointestinal: Positive for nausea. Negative for blood in stool.   Genitourinary: Negative for hematuria.   Neurological: Positive for headaches. Negative for dizziness.   Psychiatric/Behavioral: The patient is nervous/anxious.      There were no vitals filed for this visit.  BP Readings from Last 5 Encounters:   10/13/21 129/88   09/13/21 123/91   09/08/21 113/81   09/23/20 124/86   03/18/20 122/82      Physical Exam  Constitutional:       Appearance: Normal appearance.   HENT:      Head: Normocephalic and atraumatic.   Cardiovascular:      Rate and Rhythm: Normal rate and regular rhythm.   Pulmonary:      Effort: Pulmonary effort is normal.   Musculoskeletal:         General: Normal range of motion.      Cervical back:  Normal range of motion.   Skin:     General: Skin is warm and dry.   Neurological:      Mental Status: She is alert. Mental status is at baseline.   Psychiatric:         Mood and Affect: Mood normal.         Behavior: Behavior normal.       DATA REVIEW:    Other Pertinent Blood Work:   Lab Results   Component Value Date    SODIUM 137 10/08/2021    POTASSIUM 4.2 10/08/2021    CHLORIDE 100 10/08/2021    CO2 21 10/08/2021    GLUCOSE 191 (H) 10/08/2021    BUN 14 10/08/2021    CREATININE 0.81 10/08/2021    CALCIUM 10.2 10/08/2021    ASTSGOT 16 2021    ALTSGPT 12 2021    ALKPHOSPHAT 68 2021    TBILIRUBIN 0.4 2021    ALBUMIN 4.1 2021    AGRATIO 1.8 2021     Blood work from 2018  A1c 8.4  GFR 99  Albumin creatinine ratio in the urine normal  Total cholesterol 360  Triglycerides 2164  HDL 21    Stress echo 19  Good functional capacity.  negative stress echo     Lab Results   Component Value Date/Time    CHOLSTRLTOT 180 10/08/2021 07:35 AM    LDL 96 2020 11:10 AM    HDL 27 (L) 10/08/2021 07:35 AM    TRIGLYCERIDE 681 (HH) 10/08/2021 07:35 AM     Results for MARTIN CORTEZ (MRN [id number]) as of 2019 13:14   Ref. Range 2019 08:51   Glycohemoglobin Latest Ref Range: 4.8 - 5.6 % 9.9 (H)     VASCULAR IMAGING:     Last EKG:   Results for orders placed or performed during the hospital encounter of 18   EKG (NOW)   Result Value Ref Range    Report       [facility] Emergency Dept.    Test Date:  2018  Pt Name:    MARTIN CORTEZ             Department: [facility]  MRN:        [id number]                      Room:       -ROOM 5  Gender:     Female                       Technician: HRS  :        1980                   Requested By:MIKE SANTIAGO  Order #:    [id number]                    Reading MD:    Measurements  Intervals                                Axis  Rate:       99                           P:          62  NH:         132                           QRS:        33  QRSD:       86                           T:          18  QT:         341  QTc:        438    Interpretive Statements  Sinus rhythm  Probable left atrial enlargement  Baseline wander in lead(s) II,V6  No previous ECG available for comparison       Stress echo 8/27/19  Good functional capacity.  negative stress echo.    1. Essential hypertension     2. Dyslipidemia     3. Type 2 diabetes mellitus without complication, without long-term current use of insulin (HCC)     4. Hypertriglyceridemia     5. Metabolic syndrome       ASSESSMENT AND PLAN  Patient Type, check all that apply:   Primary Prevention, Type 2 Diabetes Mellitus and Metabolic Syndrome     Established Atherosclerotic Cardiovascular Disease (ASCVD):  None     Other Established (non-atherosclerotic) Vascular Disease, if Present:  None    Evidence of Heterozygous Familial Hypercholesterolemia (FH):   No     ACC/AHA Indication for Statin Therapy, stefano all that apply:  Diabetes aged 40-75: Indication for High intensity statin      Calculated Risk for ASCVD, if applicable    N/A     Other Significant Risk Markers, if any, stefano all that apply   Family history of premature ASCVD in first degree relative and Other: severe elevated triglycerides      1) Elevated trigs - severely elevated, high risk for pancreatitis   Assistant called to determine problem at pharmacy for dispensing Vascepa- apparently there is a delay due to supply issues, but the prescription is approved and will ship when able.   - Continue to reduce etoh, weight, fried/fatty foods   - Continue atorvastatin 40mg   - Continue fenofibrate 145mg daily  - Continue Vascepa; instructed her to call if needs additional samples     National Lipid Association (NLA) Goal  LDL-C:   <100 mg/dL and non-HDL less than 130, not at goal   Trigs <150 not at goal on last lab work   Low HDL. Since last lab work has only had an alcohol 3 times   - Repeat labs Prior to next visit    -Continue to try to get the Vascepa through her pharmacy      Lifestyle Recommendations From Today’s Visit:    Eating Plan: Concentrate on  Low simple carb   Some exercise every day  Slow steady weight loss-has continued to lose weight!  Continue no ETOH (been alcohol free since July 2021) Drank 3 times since summer     Statin Therapy Recommendations from Today’s Visit:   - Continue atorvastatin 40mg daily     Non-Statin Medications Recommendations from Today’s Visit:   - Continue fenofibrate 145mg daily   - Start  Vascepa 2g twice daily when available-start OTC Omega 3 fatty acids  until it is available    Indication for PCSK9 Inhibitor, if applicable:  Not currently indicated  Supplements Recommended at this visit:   -Continue vitamin D at least 2 g daily    Recommendations for Other Cardiovascular Risk Factors, stefano all that apply:     Blood Pressure Management: Goal: ACC/AHA (2017) goal <130/80  Home BP at goal: yes,  mostly a few highs when stressed while working   Office BP at goal: yes  Echo: normal  ACR: normal   Device candidate? No  Needs FMLA paperwork for uncontrolled T2DM regular appts-- filled out on last visit- copy in media  Plan:   Monitoring:   - order 24h ABPM:  UNDECIDED, consider if persistent white coat effect   - monitor lytes/gfr routinely   - contact office if BP consistently >140/>90 to discussion of tx adjustments. Take BP at least weekly to make sure it is staying down.     Medications:  ACEi/ARB: continue losartan 100mg (combo)  DHP-CCB: Consider adding amlodipine 5mg as next options  Thiazide: continue HCTZ 25mg (combo), consider change to chlorthalidone   Other:   Aldosterone Antagonist: Consider adding spironolactone 25mg as 4th agent   Loop Diuretic: not indicated   Peripheral Alpha Blocker: not indicated   Other CCB: not indicated   Direct Vasodilator: not indicated   Centrally Acting Alpha Agonist: not indicated   Other:   BB: not indicated   DRI: not indicated     T2D:  non-insulin, uncontrolled   Did not tolerate metformin due to GI issues.   Blood sugars running 168 average fasting per continuous glucose monitoring   Has had continued nausea with Trulicity   Last A1c = 8.3  Goal A1c < 7.0  ACR: normal   Stressed importance of close follow up as currently DM poorly controlled  Plan:  - Continue follow up with DM clinic for management (appt on 1/13/22)  - Continue meds as rec  - Recommmend for routine care with PCP (or endocrine) to include regular A1c monitoring, annual albumin/creatinine ratio (ACR), annual diabetic retinopathy screening, foot exams, annual flu vaccine, and updates to pneumonia vaccines as appropriate     Studies Ordered at Todays Visit: none   Blood Work Ordered At Today’s visit: Lipid and CMP  Follow-Up:  as planned with DM clinic     Time: 36 min - chart review/prep, review of other providers' records, imaging/lab review, face-to-face time for history/examination, ordering, prescribing,  review of results/meds/ treatment plan with patient/family/caregiver, documentation in EMR, care coordination (as needed)      DEN Velázquez.

## 2022-01-04 RX ORDER — DULAGLUTIDE 1.5 MG/.5ML
INJECTION, SOLUTION SUBCUTANEOUS
Qty: 6 ML | Refills: 1 | Status: SHIPPED | OUTPATIENT
Start: 2022-01-04 | End: 2022-07-05

## 2022-01-05 ENCOUNTER — TELEPHONE (OUTPATIENT)
Dept: VASCULAR LAB | Facility: MEDICAL CENTER | Age: 42
End: 2022-01-05

## 2022-01-05 NOTE — TELEPHONE ENCOUNTER
I have contacted the patient and left a voicemail at the preferred number on file. The patient has the [facility]. [facility] offer free delivery to the patient’s preferred address.        The patient has a copay of $368.32/ days for Trulicity. Additional FA maybe available to reduce cost.     Thank you,    [facility]  Pharmacy Coordinator  [phone]

## 2022-01-06 ENCOUNTER — TELEPHONE (OUTPATIENT)
Dept: VASCULAR LAB | Facility: MEDICAL CENTER | Age: 42
End: 2022-01-06

## 2022-01-06 NOTE — TELEPHONE ENCOUNTER
I have contacted the patient and left a voicemail at the preferred number on file. The [facility] has the [facility]. [facility] offer free delivery to the patient’s preferred address.          The patient has a copay of $368.32/ days for Trulicity. Additional FA maybe available to reduce cost.      Thank you,     [facility]  Pharmacy Coordinator  [phone]

## 2022-02-18 ENCOUNTER — NON-PROVIDER VISIT (OUTPATIENT)
Dept: VASCULAR LAB | Facility: MEDICAL CENTER | Age: 42
End: 2022-02-18
Attending: INTERNAL MEDICINE
Payer: COMMERCIAL

## 2022-02-18 ENCOUNTER — APPOINTMENT (OUTPATIENT)
Dept: VASCULAR LAB | Facility: MEDICAL CENTER | Age: 42
End: 2022-02-18
Payer: COMMERCIAL

## 2022-02-18 DIAGNOSIS — E11.9 TYPE 2 DIABETES MELLITUS WITHOUT COMPLICATION, WITHOUT LONG-TERM CURRENT USE OF INSULIN (HCC): ICD-10-CM

## 2022-02-18 PROCEDURE — 99212 OFFICE O/P EST SF 10 MIN: CPT

## 2022-02-18 RX ORDER — METFORMIN HYDROCHLORIDE 500 MG/1
500 TABLET, EXTENDED RELEASE ORAL DAILY
Qty: 30 TABLET | Refills: 2 | Status: SHIPPED | OUTPATIENT
Start: 2022-02-18 | End: 2022-03-16

## 2022-02-18 NOTE — PROGRESS NOTES
Patient Consult Note    TIME IN: 3.00  TIME OUT: 3.30    Primary care physician: Ana Lilia Pollock M.D.    Reason for consult: Management of Uncontrolled Type 2 Diabetes    HPI:  Gladys Cortez is a 41 y.o. old patient who comes in today for evaluation of above stated problem.    Most Recent HbA1c:   Lab Results   Component Value Date/Time    HBA1C 8.3 (A) 10/13/2021 03:20 PM      Lab Results   Component Value Date/Time    CREATININE 0.81 10/08/2021 07:35 AM    CREATININE 0.69 04/14/2018 02:01 AM              Diabetes Medication History and Current Regimen    GLP-1 Agent: Dulaglutide 1.5 mg once weekly   SGLT-2 Inhibitor:  Dapagliflozin 10 mg once daily     Pt has home glucometer and proper testing technique -     Pt reports blood sugars:     Other times: Has not tested in a while but was averaging in the 180    Hypoglycemia awareness - Yes  Nocturnal hypoglycemia- None  Hypoglycemia:  None    Pt's treatment of Hypoglycemia -  - 15:15 Rule    Current Exercise - None  Exercise Goal - 30 mins 5 days a week of physical activity     Foot Exam:  Monofilament exam - Monofilament exam - Completed 9/2021    Preventative Management  BP regimen (ACE/ARB) - Losartan 100 mg once daily  ASA - N/a  Statin - Atorvastatin 40 mg once daily  Last Retinal Scan - Completed 6/2021  Last Foot Exam - Pt to start checking regularly  Last A1c -   Lab Results   Component Value Date/Time    HBA1C 8.3 (A) 10/13/2021 03:20 PM      Last Microalbuminuria -     Ref. Range 10/8/2021 07:35   Microalbumin, Urine Random Latest Ref Range: Not Estab. ug/mL      Results for GLADYS CORTEZ (MRN [id number]) as of 2/18/2022 15:12   Ref. Range 3/4/2020 11:10 9/15/2020 12:13 6/30/2021 08:35 10/8/2021 07:35 10/13/2021 15:20   GFR If Non  Latest Ref Range: >59 mL/min/1.73 77 83 111 91      Past Medical History:  Patient Active Problem List    Diagnosis Date Noted   • BMI 36.0-36.9,adult 12/27/2019   • Hypertriglyceridemia  12/27/2019   • Metabolic syndrome 12/27/2019   • Essential hypertension 02/21/2019   • Dyslipidemia 02/21/2019   • Type 2 diabetes mellitus without complication, without long-term current use of insulin (HCC) 02/21/2019   • Other chest pain 02/21/2019   • MERINO (dyspnea on exertion) 02/21/2019   • Depressive disorder 12/15/2008   • Chalazion 10/23/2007   • Tobacco use disorder 01/08/2007   • 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    Allergies:  Patient has no known allergies.    Social History:  Social History     Socioeconomic History   • Marital status: Single     Spouse name: Not on file   • Number of children: Not on file   • Years of education: Not on file   • Highest education level: Not on file   Occupational History   • Not on file   Tobacco Use   • Smoking status: Current Some Day Smoker     Packs/day: 0.00   • Smokeless tobacco: Never Used   Substance and Sexual Activity   • Alcohol use: Yes     Alcohol/week: 6.0 oz     Types: 10 Standard drinks or equivalent per week   • Drug use: Yes     Types: Inhaled     Comment: marijuanna   • Sexual activity: Not on file   Other Topics Concern   • Not on file   Social History Narrative   • Not on file     Social Determinants of Health     Financial Resource Strain: Not on file   Food Insecurity: Not on file   Transportation Needs: Not on file   Physical Activity: Not on file   Stress: Not on file   Social Connections: Not on file   Intimate Partner Violence: Not on file   Housing Stability: Not on file       Family History:  No family history on file.    Medications:    Current Outpatient Medications:   •  TRULICITY 1.5 MG/0.5ML Solution Pen-injector, INJECT 1 PEN SUBCUTANEOUSLYEVERY 7 DAYS, Disp: 6 mL, Rfl: 1  •  brimonidine (ALPHAGAN) 0.2 % Solution,  , Disp: , Rfl:   •  esomeprazole (NEXIUM) 40 MG delayed-release capsule, , Disp: , Rfl:   •  ibuprofen (MOTRIN) 200 MG Tab, , Disp: , Rfl:   •  timolol (TIMOPTIC) 0.25 % Solution, , Disp: , Rfl:   •  Icosapent Ethyl (VASCEPA) 1 g Cap, Take 2 Tablets by mouth 2 (two) times a day., Disp: 360 Capsule, Rfl: 3  •  Continuous Blood Gluc Sensor (FREESTYLE BIB 14 DAY SENSOR) Misc, 1 Each every day. Use to test blood glucose daily as directed, Disp: 6 Each, Rfl: 3  •  atorvastatin (LIPITOR) 40 MG Tab, Take 1.5 tabs daily in the evening, Disp: 135 Tablet, Rfl: 3  •  fenofibrate (TRICOR) 145 MG Tab, Take 1 Tablet by mouth every day., Disp: 90 Tablet, Rfl: 3  •  losartan-hydrochlorothiazide (HYZAAR) 100-25 MG per tablet, Take 1 Tablet by mouth every day., Disp: 90 Tablet, Rfl: 3  •  buPROPion (WELLBUTRIN XL) 150 MG XL tablet, Take 150 mg by mouth every day., Disp: , Rfl:   •  Dapagliflozin Propanediol (FARXIGA) 10 MG Tab, Take 10 mg by mouth every day., Disp: 90 tablet, Rfl: 3  •  ALBUTEROL INH, Inhale  by mouth., Disp: , Rfl:     Labs: Reviewed    Physical Examination:  Vital signs: There were no vitals taken for this visit. There is no height or weight on file to calculate BMI.    Assessment and Plan:    1. DM2  • Patient was seen today for a follow up on DM II. Since last apt, patient states that she had a difficult month. She missed 3 weeks of Trulicity and also ate unhealthy. She attributes this to depression. She did not perform any physical activity during this period either.  • She is now motivated and willing to resume a healthy lifestyle  • Patient had been resistant to re-trial of metformin in the past but after further discussion she is willing to try metformin  mg once daily. GFR >45 ml/min.    - Medication changes:  • Start metformin 500 mg ER once daily with dinner. (Start on the weekend due to possible intolerance)   • Continue other DM medications     - Lifestyle changes:  • Start walking again daily    • Decrease carb consumption    No follow-ups on file.    Taco Brennan, PharmD  02/18/22    CC:   Ana Lilia Pollock M.D.

## 2022-03-16 DIAGNOSIS — E11.9 TYPE 2 DIABETES MELLITUS WITHOUT COMPLICATION, WITHOUT LONG-TERM CURRENT USE OF INSULIN (HCC): ICD-10-CM

## 2022-03-16 RX ORDER — METFORMIN HYDROCHLORIDE 500 MG/1
500 TABLET, EXTENDED RELEASE ORAL DAILY
Qty: 90 TABLET | Refills: 1 | Status: SHIPPED | OUTPATIENT
Start: 2022-03-16 | End: 2022-03-16 | Stop reason: SDUPTHER

## 2022-03-16 RX ORDER — METFORMIN HYDROCHLORIDE 500 MG/1
500 TABLET, EXTENDED RELEASE ORAL DAILY
Qty: 90 TABLET | Refills: 1 | Status: SHIPPED | OUTPATIENT
Start: 2022-03-16 | End: 2023-03-27 | Stop reason: SDUPTHER

## 2022-04-14 ENCOUNTER — DOCUMENTATION (OUTPATIENT)
Dept: VASCULAR LAB | Facility: MEDICAL CENTER | Age: 42
End: 2022-04-14
Payer: COMMERCIAL

## 2022-04-14 DIAGNOSIS — E11.9 TYPE 2 DIABETES MELLITUS WITHOUT COMPLICATION, WITHOUT LONG-TERM CURRENT USE OF INSULIN (HCC): ICD-10-CM

## 2022-04-18 ENCOUNTER — APPOINTMENT (OUTPATIENT)
Dept: VASCULAR LAB | Facility: MEDICAL CENTER | Age: 42
End: 2022-04-18
Payer: COMMERCIAL

## 2022-05-16 ENCOUNTER — APPOINTMENT (OUTPATIENT)
Dept: VASCULAR LAB | Facility: MEDICAL CENTER | Age: 42
End: 2022-05-16
Payer: COMMERCIAL

## 2022-05-19 ENCOUNTER — TELEPHONE (OUTPATIENT)
Dept: VASCULAR LAB | Facility: MEDICAL CENTER | Age: 42
End: 2022-05-19
Payer: COMMERCIAL

## 2022-05-19 NOTE — TELEPHONE ENCOUNTER
Left vm message to reschedule cancelled follow up visit with the pharmacist (DM)    Carmen Cosby, Clinical Pharmacist, CDE, CACP

## 2022-06-07 ENCOUNTER — OFFICE VISIT (OUTPATIENT)
Dept: VASCULAR LAB | Facility: MEDICAL CENTER | Age: 42
End: 2022-06-07
Attending: NURSE PRACTITIONER
Payer: COMMERCIAL

## 2022-06-07 VITALS
HEART RATE: 98 BPM | WEIGHT: 228 LBS | HEIGHT: 67 IN | BODY MASS INDEX: 35.79 KG/M2 | DIASTOLIC BLOOD PRESSURE: 78 MMHG | SYSTOLIC BLOOD PRESSURE: 106 MMHG

## 2022-06-07 DIAGNOSIS — E78.5 DYSLIPIDEMIA: ICD-10-CM

## 2022-06-07 DIAGNOSIS — E11.9 TYPE 2 DIABETES MELLITUS WITHOUT COMPLICATION, WITHOUT LONG-TERM CURRENT USE OF INSULIN (HCC): ICD-10-CM

## 2022-06-07 DIAGNOSIS — E88.810 METABOLIC SYNDROME: ICD-10-CM

## 2022-06-07 DIAGNOSIS — E78.1 HYPERTRIGLYCERIDEMIA: ICD-10-CM

## 2022-06-07 DIAGNOSIS — I10 ESSENTIAL HYPERTENSION: ICD-10-CM

## 2022-06-07 LAB
HBA1C MFR BLD: 8 % (ref 0–5.6)
INT CON NEG: ABNORMAL
INT CON POS: ABNORMAL

## 2022-06-07 PROCEDURE — 99212 OFFICE O/P EST SF 10 MIN: CPT

## 2022-06-07 PROCEDURE — 83036 HEMOGLOBIN GLYCOSYLATED A1C: CPT | Performed by: NURSE PRACTITIONER

## 2022-06-07 PROCEDURE — 99214 OFFICE O/P EST MOD 30 MIN: CPT | Performed by: NURSE PRACTITIONER

## 2022-06-07 RX ORDER — CHLORAL HYDRATE 500 MG
1000 CAPSULE ORAL
COMMUNITY
End: 2022-06-07

## 2022-06-07 RX ORDER — ICOSAPENT ETHYL 1000 MG/1
2 CAPSULE ORAL
Qty: 120 CAPSULE | Refills: 3 | Status: SHIPPED | OUTPATIENT
Start: 2022-06-07 | End: 2022-07-11 | Stop reason: SDUPTHER

## 2022-06-07 ASSESSMENT — ENCOUNTER SYMPTOMS
BLOOD IN STOOL: 0
NERVOUS/ANXIOUS: 1
HEADACHES: 1
NAUSEA: 1
BLURRED VISION: 0
SHORTNESS OF BREATH: 0
PALPITATIONS: 0
DIZZINESS: 0

## 2022-06-07 NOTE — PROGRESS NOTES
[facility]   Date of Service: 06/07/2022    Gladys Lombardi has been referred for evaluation and management of dyslipidemia    Referral Source: PCP    HPI    HTN:  Current HTN concerns: No specific concerns since last visit   ADRs: No  Recent studies/labs completed (reviewed with patient, noted below): yes  HTN sx:  No current blurred or changed vision, chest pain, shortness of breath, headache, nausea, dizziness/vertigo   Home BP log: does not check at home   24h ABPM completed: not tested   Adherence to current HTN meds: compliant all of the time     Anticoagulation/antiplats:   No    T2D:  Mild nausea since starting Trulicity, doing ok with it currently   Seeing DM pharmacist in a couple weeks    HLD:   Taking atorvastatin and fenofibrate   Did not start Vascepa due to cost   [facility]- [facility] currently     History of ASCVD: No  Age at Initial Diagnosis: Oct 2018 - high triglycerides    Current Prescription Lipid Lowering Medications - including dose:   Statin: Atorvastatin  Non-Statin: Fenofibrate  Current Lipid Lowering and Related Supplements:   None  Any Current Side Effects Potentially Related to Lipid Lowering therapy?   No  Current Adherence to Lipid Lowering Therapies   Complete  Previously Attempted Interventions for Lipids - including outcome  Statin: None    Outcome: N/A  Non-Statin: None   Outcome: N/A  Any Previous History of Statin Intolerance?   No  Baseline Lipids Prior to Treatment:   Trigs >2000  Other Pertinent History:   No pancreatitis  No known thyroid disease  No liver or kidney issues  No pregnancies or kids  Not currently sexual active  She does have a history of intermittent chest pain.  She says it is worse with exercise.  She also complains of dyspnea on exertion but has not experience this lately.    CURRENT MEDICATIONS:   Current Outpatient Medications:   •  fish oil, 1,000 mg, Oral, TID WITH MEALS, Taking  •  metFORMIN ER, 500 mg, Oral, DAILY,  "Taking  •  Trulicity, INJECT 1 PEN SUBCUTANEOUSLYEVERY 7 DAYS, Taking  •  brimonidine, , Taking  •  esomeprazole, , Taking  •  ibuprofen, , Taking  •  timolol, , Taking  •  FreeStyle Joey 14 Day Sensor, 1 Each, Does not apply, DAILY, Taking  •  atorvastatin, Take 1.5 tabs daily in the evening, Taking  •  fenofibrate, 145 mg, Oral, DAILY, Taking  •  losartan-hydrochlorothiazide, 1 Tablet, Oral, DAILY, Taking  •  buPROPion, 150 mg, Oral, QDAY, Taking  •  Farxiga, 10 mg, Oral, DAILY, Taking  •  ALBUTEROL INH, Inhale  by mouth., Taking  •  Icosapent Ethyl, 2 Tablet, Oral, BID     ALLERGIES: Patient has no known allergies.     SOCIAL HISTORY   Social History     Tobacco Use   Smoking Status Current Some Day Smoker   • Packs/day: 0.00   Smokeless Tobacco Never Used   Had quit smoking, but now back to smoking a few a week    Change in weight: decreased   BMI Readings from Last 5 Encounters:   06/07/22 35.71 kg/m²   01/03/22 35.71 kg/m²   10/13/21 36.34 kg/m²   09/08/21 37.28 kg/m²   09/23/20 39.38 kg/m²      Exercise habits: limited by back pain; starting with stretching and yoga-goal to loose 10 lbs in 3 months, genet system   Diet:Gained 21 lbs since last visit - low carbohydrate, diabetic going to do a diet with her friend    Review of Systems   Eyes: Negative for blurred vision.   Respiratory: Negative for shortness of breath.    Cardiovascular: Negative for chest pain, palpitations and leg swelling.   Gastrointestinal: Positive for nausea. Negative for blood in stool.   Genitourinary: Negative for hematuria.   Neurological: Positive for headaches. Negative for dizziness.   Psychiatric/Behavioral: The patient is nervous/anxious.      Vitals:    06/07/22 1526   BP: 106/78   BP Location: Left arm   Patient Position: Sitting   BP Cuff Size: Adult   Pulse: 98   Weight: 103 kg (228 lb)   Height: 1.702 m (5' 7\")     BP Readings from Last 5 Encounters:   06/07/22 106/78   01/03/22 111/76   10/13/21 129/88   09/13/21 123/91 "   09/08/21 113/81      Physical Exam  Constitutional:       Appearance: Normal appearance.   HENT:      Head: Normocephalic and atraumatic.   Cardiovascular:      Rate and Rhythm: Normal rate and regular rhythm.   Pulmonary:      Effort: Pulmonary effort is normal.   Musculoskeletal:         General: Normal range of motion.      Cervical back: Normal range of motion.   Skin:     General: Skin is warm and dry.   Neurological:      Mental Status: She is alert. Mental status is at baseline.   Psychiatric:         Mood and Affect: Mood normal.         Behavior: Behavior normal.       DATA REVIEW:    Other Pertinent Blood Work:   Lab Results   Component Value Date    SODIUM 137 10/08/2021    POTASSIUM 4.2 10/08/2021    CHLORIDE 100 10/08/2021    CO2 21 10/08/2021    GLUCOSE 191 (H) 10/08/2021    BUN 14 10/08/2021    CREATININE 0.81 10/08/2021    CALCIUM 10.2 10/08/2021    ASTSGOT 16 06/30/2021    ALTSGPT 12 06/30/2021    ALKPHOSPHAT 68 06/30/2021    TBILIRUBIN 0.4 06/30/2021    ALBUMIN 4.1 06/30/2021    AGRATIO 1.8 06/30/2021     Blood work from October 2018  A1c 8.4  GFR 99  Albumin creatinine ratio in the urine normal  Total cholesterol 360  Triglycerides 2164  HDL 21    Stress echo 8/27/19  Good functional capacity.  negative stress echo     Lab Results   Component Value Date/Time    CHOLSTRLTOT 180 10/08/2021 07:35 AM    LDL 96 03/04/2020 11:10 AM    HDL 27 (L) 10/08/2021 07:35 AM    TRIGLYCERIDE 681 (HH) 10/08/2021 07:35 AM     Results for MARTIN CORTEZ (MRN [id number]) as of 12/27/2019 13:14   Ref. Range 11/22/2019 08:51   Glycohemoglobin Latest Ref Range: 4.8 - 5.6 % 9.9 (H)     VASCULAR IMAGING:     Last EKG:   Results for orders placed or performed during the hospital encounter of 04/14/18   EKG (NOW)   Result Value Ref Range    Report       [facility]    Test Date:  2018-04-14  Pt Name:    MARTIN CORTEZ             Department: [facility]  MRN:        [id number]                       [facility]  Gender:     Female                       Technician: HRS  :        1980                   Requested By:MIKE SANTIAGO  Order #:    [id number]                    Reading MD:    Measurements  Intervals                                Axis  Rate:       99                           P:          62  PA:         132                          QRS:        33  QRSD:       86                           T:          18  QT:         341  QTc:        438    Interpretive Statements  Sinus rhythm  Probable left atrial enlargement  Baseline wander in lead(s) II,V6  No previous ECG available for comparison       Stress echo 19  Good functional capacity.  negative stress echo.    1. Type 2 diabetes mellitus without complication, without long-term current use of insulin (HCC)  POCT Hemoglobin A1C     ASSESSMENT AND PLAN  Patient Type, check all that apply:   Primary Prevention, Type 2 Diabetes Mellitus and Metabolic Syndrome     Established Atherosclerotic Cardiovascular Disease (ASCVD):  None     Other Established (non-atherosclerotic) Vascular Disease, if Present:  None    Evidence of Heterozygous Familial Hypercholesterolemia (FH):   No     ACC/AHA Indication for Statin Therapy, stefano all that apply:  Diabetes aged 40-75: Indication for High intensity statin      Calculated Risk for ASCVD, if applicable    N/A     Other Significant Risk Markers, if any, stefano all that apply   Family history of premature ASCVD in first degree relative and Other: severe elevated triglycerides      1) Elevated trigs - severely elevated, high risk for pancreatitis   Vascepa supply issues in past and she was never able to fill, may be cost prohibitive, but will reorder to determine cost  - Continue to reduce etoh, weight, fried/fatty foods   - Continue atorvastatin 40mg   - Continue fenofibrate 145mg daily  - Restart Vascepa- given copay card and she has info for [employer]  - Consider Lovaza trial if unable to fill Vascepa      National Lipid Association (NLA) Goal  LDL-C:   <100 mg/dL and non-HDL less than 130, not at goal   Trigs <150 not at goal on last lab work   Low HDL.   Labs drawn but not available at this visit- MA requesting results   - Repeat labs Prior to next visit   - Continue to try to get the Vascepa through her pharmacy- given copay card       Lifestyle Recommendations From Today’s Visit:    Eating Plan: Concentrate on  Low simple carb   Increase exercise every day  Slow steady weight loss-has continued to lose weight!  Keep it up with goal of at least down 5 lbs by next appt  Continue no ETOH (been alcohol free x 1 year!!!- Congratulated on her great accomplishment     Statin Therapy Recommendations from Today’s Visit:   - Continue atorvastatin 40mg daily     Non-Statin Medications Recommendations from Today’s Visit:   - Continue fenofibrate 145mg daily   - Re-Start Vascepa 2g twice daily when available-start OTC Omega 3 fatty acids until it is available    Indication for PCSK9 Inhibitor, if applicable:  Not currently indicated  Supplements Recommended at this visit:   - Continue vitamin D at least 2 g daily    Recommendations for Other Cardiovascular Risk Factors, stefano all that apply:     Blood Pressure Management: Goal: ACC/AHA (2017) goal <130/80  Home BP at goal: yes,  mostly a few highs when stressed while working   Office BP at goal: yes  Echo: normal  ACR: normal   Device candidate? No  HR tachycardic in past, notes HR mostly 90's at home  Plan:   Monitoring:   - order 24h ABPM:  UNDECIDED, consider if persistent white coat effect   - monitor lytes/gfr routinely   - contact office if BP consistently >140/>90 to discussion of tx adjustments. Take BP at least weekly to make sure it is staying down.     Medications:  ACEi/ARB: continue losartan 100mg (combo)  DHP-CCB: Consider adding amlodipine 5mg as next options  Thiazide: continue HCTZ 25mg (combo), consider change to chlorthalidone   Other:   Aldosterone  Antagonist: Consider adding spironolactone 25mg as 4th agent   Loop Diuretic: not indicated   Peripheral Alpha Blocker: not indicated   Other CCB: not indicated   Direct Vasodilator: not indicated   Centrally Acting Alpha Agonist: not indicated   Other:   BB: not indicated   DRI: not indicated     T2D: non-insulin, uncontrolled   Did not tolerate metformin due to GI issues.   Blood sugars running 168 average fasting per continuous glucose monitoring   Has had continued nausea with Trulicity   Last A1c = 8.0  Goal A1c < 7.0  ACR: normal   Stressed importance of close follow up- usually sees DM clinic but due to her job it is difficult to get time off for appts- A1C stable for now, recommend follow up in 6 months   Plan:  - Continue follow up with DM clinic for management in 6 months  - Continue meds as rec  - Stressed importance of continued weight loss and exercise/dietary changes to help continue to decrease her A1C- she is motivated.  - Recommmend for routine care with PCP (or endocrine) to include regular A1c monitoring, annual albumin/creatinine ratio (ACR), annual diabetic retinopathy screening, foot exams, annual flu vaccine, and updates to pneumonia vaccines as appropriate     Studies Ordered at Todays Visit: none   Blood Work Ordered At Today’s visit: Lipid, CMP, A1C  Follow-Up:  3 months; DM clinic in 6 months      DEN Escobar.

## 2022-07-11 DIAGNOSIS — I10 ESSENTIAL HYPERTENSION: ICD-10-CM

## 2022-07-11 DIAGNOSIS — E78.5 DYSLIPIDEMIA: ICD-10-CM

## 2022-07-11 DIAGNOSIS — E11.9 TYPE 2 DIABETES MELLITUS WITHOUT COMPLICATION, WITHOUT LONG-TERM CURRENT USE OF INSULIN (HCC): ICD-10-CM

## 2022-07-11 DIAGNOSIS — E88.810 METABOLIC SYNDROME: ICD-10-CM

## 2022-07-11 RX ORDER — ICOSAPENT ETHYL 1000 MG/1
2 CAPSULE ORAL
Qty: 360 CAPSULE | Refills: 1 | Status: SHIPPED | OUTPATIENT
Start: 2022-07-11 | End: 2023-03-09 | Stop reason: SDUPTHER

## 2022-09-06 ENCOUNTER — APPOINTMENT (OUTPATIENT)
Dept: VASCULAR LAB | Facility: MEDICAL CENTER | Age: 42
End: 2022-09-06
Payer: COMMERCIAL

## 2022-09-09 DIAGNOSIS — I10 ESSENTIAL HYPERTENSION: ICD-10-CM

## 2022-09-09 DIAGNOSIS — E78.5 DYSLIPIDEMIA: ICD-10-CM

## 2022-09-09 DIAGNOSIS — E78.1 HYPERTRIGLYCERIDEMIA: ICD-10-CM

## 2022-09-09 DIAGNOSIS — E11.9 TYPE 2 DIABETES MELLITUS WITHOUT COMPLICATION, WITHOUT LONG-TERM CURRENT USE OF INSULIN (HCC): ICD-10-CM

## 2022-09-13 RX ORDER — DAPAGLIFLOZIN 10 MG/1
TABLET, FILM COATED ORAL
Qty: 90 TABLET | Refills: 0 | Status: SHIPPED | OUTPATIENT
Start: 2022-09-13 | End: 2023-03-09 | Stop reason: SDUPTHER

## 2022-09-13 RX ORDER — LOSARTAN POTASSIUM AND HYDROCHLOROTHIAZIDE 25; 100 MG/1; MG/1
TABLET ORAL
Qty: 90 TABLET | Refills: 0 | Status: SHIPPED | OUTPATIENT
Start: 2022-09-13 | End: 2023-03-09 | Stop reason: SDUPTHER

## 2022-09-13 RX ORDER — FENOFIBRATE 145 MG/1
TABLET, COATED ORAL
Qty: 90 TABLET | Refills: 0 | Status: SHIPPED | OUTPATIENT
Start: 2022-09-13 | End: 2023-03-09 | Stop reason: SDUPTHER

## 2023-02-01 ENCOUNTER — TELEPHONE (OUTPATIENT)
Dept: VASCULAR LAB | Facility: MEDICAL CENTER | Age: 43
End: 2023-02-01
Payer: COMMERCIAL

## 2023-02-08 ENCOUNTER — DOCUMENTATION (OUTPATIENT)
Dept: VASCULAR LAB | Facility: MEDICAL CENTER | Age: 43
End: 2023-02-08
Payer: COMMERCIAL

## 2023-02-08 NOTE — PROGRESS NOTES
Patient left  returning call to schedule appt. Called patient back and left a message to call back.     Selam Saini, Med Ass't  [facility]

## 2023-03-09 ENCOUNTER — OFFICE VISIT (OUTPATIENT)
Dept: VASCULAR LAB | Facility: MEDICAL CENTER | Age: 43
End: 2023-03-09
Attending: NURSE PRACTITIONER
Payer: COMMERCIAL

## 2023-03-09 VITALS
HEIGHT: 67 IN | WEIGHT: 229 LBS | DIASTOLIC BLOOD PRESSURE: 104 MMHG | HEART RATE: 96 BPM | BODY MASS INDEX: 35.94 KG/M2 | SYSTOLIC BLOOD PRESSURE: 141 MMHG

## 2023-03-09 DIAGNOSIS — I10 ESSENTIAL HYPERTENSION: ICD-10-CM

## 2023-03-09 DIAGNOSIS — E88.810 METABOLIC SYNDROME: ICD-10-CM

## 2023-03-09 DIAGNOSIS — E11.9 TYPE 2 DIABETES MELLITUS WITHOUT COMPLICATION, WITHOUT LONG-TERM CURRENT USE OF INSULIN (HCC): ICD-10-CM

## 2023-03-09 DIAGNOSIS — Z59.82 TRANSPORTATION INSECURITY DUE TO LACK OF ACCESS TO VEHICLE: ICD-10-CM

## 2023-03-09 DIAGNOSIS — F17.219 CIGARETTE NICOTINE DEPENDENCE WITH NICOTINE-INDUCED DISORDER: ICD-10-CM

## 2023-03-09 DIAGNOSIS — E78.1 HYPERTRIGLYCERIDEMIA: ICD-10-CM

## 2023-03-09 DIAGNOSIS — E78.5 DYSLIPIDEMIA: ICD-10-CM

## 2023-03-09 PROCEDURE — 99214 OFFICE O/P EST MOD 30 MIN: CPT | Performed by: NURSE PRACTITIONER

## 2023-03-09 PROCEDURE — 99212 OFFICE O/P EST SF 10 MIN: CPT

## 2023-03-09 RX ORDER — ICOSAPENT ETHYL 1000 MG/1
2 CAPSULE ORAL
Qty: 360 CAPSULE | Refills: 0 | Status: SHIPPED | OUTPATIENT
Start: 2023-03-09 | End: 2023-06-23

## 2023-03-09 RX ORDER — FENOFIBRATE 145 MG/1
145 TABLET, COATED ORAL DAILY
Qty: 90 TABLET | Refills: 0 | Status: SHIPPED | OUTPATIENT
Start: 2023-03-09 | End: 2023-06-23

## 2023-03-09 RX ORDER — DAPAGLIFLOZIN 10 MG/1
10 TABLET, FILM COATED ORAL DAILY
Qty: 90 TABLET | Refills: 0 | Status: SHIPPED | OUTPATIENT
Start: 2023-03-09 | End: 2023-06-19 | Stop reason: SDUPTHER

## 2023-03-09 RX ORDER — LOSARTAN POTASSIUM AND HYDROCHLOROTHIAZIDE 25; 100 MG/1; MG/1
1 TABLET ORAL DAILY
Qty: 90 TABLET | Refills: 0 | Status: SHIPPED | OUTPATIENT
Start: 2023-03-09 | End: 2023-06-23

## 2023-03-09 RX ORDER — ATORVASTATIN CALCIUM 40 MG/1
TABLET, FILM COATED ORAL
Qty: 90 TABLET | Refills: 0 | Status: SHIPPED | OUTPATIENT
Start: 2023-03-09 | End: 2023-06-23

## 2023-03-09 RX ORDER — DULAGLUTIDE 1.5 MG/.5ML
1 INJECTION, SOLUTION SUBCUTANEOUS
Qty: 6 ML | Refills: 0 | Status: SHIPPED | OUTPATIENT
Start: 2023-03-09 | End: 2023-06-19 | Stop reason: SDUPTHER

## 2023-03-09 SDOH — ECONOMIC STABILITY - TRANSPORTATION SECURITY: TRANSPORTATION INSECURITY: Z59.82

## 2023-03-09 ASSESSMENT — ENCOUNTER SYMPTOMS
WEAKNESS: 0
MYALGIAS: 0
NERVOUS/ANXIOUS: 0
SHORTNESS OF BREATH: 0

## 2023-03-09 NOTE — PROGRESS NOTES
"[facility] - [facility]   Date of Service: 3/9/23    Gladys Lombardi has been referred for evaluation and management of dyslipidemia    Referral Source: PCP    HPI: has not been seen since 6/2022 d/t financial struggles.  Has been off all meds for a few mo; ran out and couldn't afford copay.    HTN:  Current HTN concerns: none at present; elevated off meds  ADRs: No  Recent studies/labs completed (reviewed with patient, noted below): yes  HTN sx:  No current blurred or changed vision, chest pain, shortness of breath, headache, nausea, dizziness/vertigo   Home BP log: was, \"well controlled\" when she was on her meds  24h ABPM completed: not tested   Adherence to current HTN meds: noncompliant at present; ran out and did not request refills    T2D:  Has not followed up with DMII as scheduled; last visit >1yr ago  Was taking and tolerating metformin, trulicity and farxiga; been off as she ran out    HLD:   Atorvastatin, vascepa, and fenofibrate - tolerated while taking. Again, off at this time    History of ASCVD: No  Age at Initial Diagnosis: Oct 2018 - high triglycerides    Current Prescription Lipid Lowering Medications:  Statin: Atorvastatin 40  Non-Statin: Fenofibrate 145, vascepa  Current Lipid Lowering and Related Supplements:   None  Any Current Side Effects Potentially Related to Lipid Lowering therapy?   No  Current Adherence to Lipid Lowering Therapies   Complete  Previously Attempted Interventions for Lipids - including outcome  Statin: None    Outcome: N/A  Non-Statin: None   Outcome: N/A  Any Previous History of Statin Intolerance?   No  Baseline Lipids Prior to Treatment:   Trigs >2000  Other Pertinent History:   No pancreatitis  No known thyroid disease  No liver or kidney issues  No pregnancies or kids      CURRENT MEDICATIONS:   Current Outpatient Medications:     Diclofenac Sodium, Apply  topically., Taking    losartan-hydrochlorothiazide, 1 Tablet, Oral, DAILY    fenofibrate, 145 mg, Oral, " "DAILY    Icosapent Ethyl, 2 Tablet, Oral, BID    Trulicity, 1 PEN, Subcutaneous, Q7 DAYS    atorvastatin, 1 tab po qhs    dapagliflozin propanediol, 10 mg, Oral, DAILY    metFORMIN ER, 500 mg, Oral, DAILY, Taking    brimonidine, , Taking    esomeprazole, , Taking    ibuprofen, , Taking    timolol, , Taking    FreeStyle Joey 14 Day Sensor, 1 Each, Does not apply, DAILY, Taking    buPROPion, 150 mg, Oral, QDAY, Taking    ALBUTEROL INH, Inhale  by mouth., Taking     ALLERGIES: Patient has no known allergies.     SOCIAL HISTORY   Social History     Tobacco Use   Smoking Status Some Days    Packs/day: 0.00    Types: Cigarettes   Smokeless Tobacco Never   Smokin cig/day; says she's decreasing over time.  Has quit in the past    Change in weight: none  BMI Readings from Last 5 Encounters:   23 35.87 kg/m²   22 35.71 kg/m²   22 35.71 kg/m²   10/13/21 36.34 kg/m²   21 37.28 kg/m²        Review of Systems   Respiratory:  Negative for shortness of breath.    Cardiovascular:  Negative for chest pain and leg swelling.   Musculoskeletal:  Negative for myalgias.   Neurological:  Negative for weakness.   Psychiatric/Behavioral:  The patient is not nervous/anxious.    Vitals:    23 1507 23 1511   BP: (!) 143/101 (!) 141/104   BP Location: Left arm Left arm   Patient Position: Sitting Sitting   BP Cuff Size: Adult Adult   Pulse: (!) 109 96   Weight: 104 kg (229 lb)    Height: 1.702 m (5' 7\")      BP Readings from Last 5 Encounters:   23 (!) 141/104   22 106/78   22 111/76   10/13/21 129/88   21 123/91      Physical Exam  Constitutional:       Appearance: Normal appearance.   HENT:      Head: Normocephalic and atraumatic.   Cardiovascular:      Rate and Rhythm: Normal rate and regular rhythm.   Pulmonary:      Effort: Pulmonary effort is normal.   Musculoskeletal:         General: Normal range of motion.      Cervical back: Normal range of motion.   Skin:     General: " Skin is warm and dry.   Neurological:      Mental Status: She is alert. Mental status is at baseline.   Psychiatric:         Mood and Affect: Mood normal.         Behavior: Behavior normal.     DATA REVIEW:    Other Pertinent Blood Work:   Lab Results   Component Value Date    SODIUM 137 10/08/2021    POTASSIUM 4.2 10/08/2021    CHLORIDE 100 10/08/2021    CO2 21 10/08/2021    GLUCOSE 191 (H) 10/08/2021    BUN 14 10/08/2021    CREATININE 0.81 10/08/2021    CALCIUM 10.2 10/08/2021    ASTSGOT 16 2021    ALTSGPT 12 2021    ALKPHOSPHAT 68 2021    TBILIRUBIN 0.4 2021    ALBUMIN 4.1 2021    AGRATIO 1.8 2021     Blood work from 2018  A1c 8.4  GFR 99  Albumin creatinine ratio in the urine normal  Total cholesterol 360  Triglycerides 2164  HDL 21    Stress echo 19  Good functional capacity.  negative stress echo     Lab Results   Component Value Date/Time    CHOLSTRLTOT 180 10/08/2021 07:35 AM    LDL 96 2020 11:10 AM    HDL 27 (L) 10/08/2021 07:35 AM    TRIGLYCERIDE 681 (HH) 10/08/2021 07:35 AM     Results for MARTIN CORTEZ (MRN [id number]) as of 2019 13:14   Ref. Range 2019 08:51   Glycohemoglobin Latest Ref Range: 4.8 - 5.6 % 9.9 (H)     VASCULAR IMAGING:     Last EKG:   Results for orders placed or performed during the hospital encounter of 18   EKG (NOW)   Result Value Ref Range    Report       [facility].    Test Date:  2018  Pt Name:    MARTIN CORTEZ             Department: [facility]  MRN:        [id number]                      Room:       -ROOM 5  Gender:     Female                       Technician: HRS  :        1980                   Requested By:MIKE SANTIAGO  Order #:    [id number]                    Reading MD:    Measurements  Intervals                                Axis  Rate:       99                           P:          62  DE:         132                          QRS:        33  QRSD:        86                           T:          18  QT:         341  QTc:        438    Interpretive Statements  Sinus rhythm  Probable left atrial enlargement  Baseline wander in lead(s) II,V6  No previous ECG available for comparison       Stress echo 8/27/19  Good functional capacity.  negative stress echo.    1. Essential hypertension  losartan-hydrochlorothiazide (HYZAAR) 100-25 MG per tablet    Icosapent Ethyl (VASCEPA) 1 g Cap      2. Hypertriglyceridemia  fenofibrate (TRICOR) 145 MG Tab    atorvastatin (LIPITOR) 40 MG Tab      3. Dyslipidemia  fenofibrate (TRICOR) 145 MG Tab    Icosapent Ethyl (VASCEPA) 1 g Cap    atorvastatin (LIPITOR) 40 MG Tab      4. Type 2 diabetes mellitus without complication, without long-term current use of insulin (HCC)  Icosapent Ethyl (VASCEPA) 1 g Cap    Dulaglutide (TRULICITY) 1.5 MG/0.5ML Solution Pen-injector    dapagliflozin propanediol (FARXIGA) 10 MG Tab      5. Metabolic syndrome  Icosapent Ethyl (VASCEPA) 1 g Cap      6. BMI 36.0-36.9,adult  Icosapent Ethyl (VASCEPA) 1 g Cap      7. Transportation insecurity due to lack of access to vehicle        8. Cigarette nicotine dependence with nicotine-induced disorder          ASSESSMENT AND PLAN  Patient Type, check all that apply:   Primary Prevention, Type 2 Diabetes Mellitus and Metabolic Syndrome     Established Atherosclerotic Cardiovascular Disease (ASCVD):  None     Other Established (non-atherosclerotic) Vascular Disease, if Present:  None    Evidence of Heterozygous Familial Hypercholesterolemia (FH):   No     ACC/AHA Indication for Statin Therapy, stefano all that apply:  Diabetes aged 40-75: Indication for High intensity statin      Calculated Risk for ASCVD, if applicable    N/A     Other Significant Risk Markers, if any, stefano all that apply   Family history of premature ASCVD in first degree relative and Other: severe elevated triglycerides       1) Elevated trigs - severely elevated, high risk for pancreatitis   -  "Continue to reduce etoh, weight, fried/fatty foods   - Restart atorvastatin 40mg, fenofibrate 145mg, and Vascepa daily    National Lipid Association (NLA) Goal  LDL-C:   <100 mg/dL and non-HDL less than 130   Trigs <150   No new labs; been off meds    Lifestyle Recommendations From Today’s Visit:    Eating Plan: Concentrate on  Low simple carb   Increase exercise with a goal of elevating HR daily for 20-30min  Continue to limit ETOH; she drinks, \"a few drinks every 2-3mo\"    Statin Therapy Recommendations from Today’s Visit:   - Restart atorvastatin 40mg daily     Non-Statin Medications Recommendations from Today’s Visit:   - Restart fenofibrate 145mg daily   - Restart Vascepa 2g twice daily     Indication for PCSK9 Inhibitor, if applicable:  Not currently indicated    Supplements Recommended at this visit:   - Continue vitamin D     Recommendations for Other Cardiovascular Risk Factors, stefano all that apply:     Blood Pressure Management: Goal: ACC/AHA (2017) goal <130/80  Home BP at goal: no  Office BP at goal: no  Plan:   Monitoring:   - monitor lytes/gfr routinely   - contact office if BP consistently >140/>90 to discussion of tx adjustments.     Medications:  ACEi/ARB: restart losartan 100mg (combo)  DHP-CCB: Consider adding amlodipine 5mg as next options  Thiazide: restart HCTZ 25mg (combo)  Other:   Aldosterone Antagonist: Consider adding spironolactone 25mg as 4th agent   Loop Diuretic: not indicated   Peripheral Alpha Blocker: not indicated   Other CCB: not indicated   Direct Vasodilator: not indicated   Centrally Acting Alpha Agonist: not indicated   Other:   BB: not indicated   DRI: not indicated     T2D: non-insulin, uncontrolled previously  Did not tolerate metformin due to GI issues in the past, but doing well on ER dose while on  Goal A1c < 7.0  ACR: normal   Stressed importance of close follow up  Plan:  - Re-establish with DM clinic   - Restart previously prescribed meds  - Stressed importance of " continued weight loss and exercise/dietary changes to help continue to decrease her A1C- she is motivated.  - Recommmend for routine care to include regular A1c monitoring, annual albumin/creatinine ratio (ACR), annual diabetic retinopathy screening, foot exams, annual flu vaccine, and updates to pneumonia vaccines as appropriate       Smoking:  encouraged continued decrease in cig/day with overall goal of 0.  She may be increasing her buproprion dose in the near future which she hopes will help  - continue to address at every visit  Provided strong recommendation for complete cessation and informed this is the primary contributor to the majority of all ASCVD and cancer-related conditions and can result in significant morbidity and early mortality.       Lack of transportation and financial difficulty- she has no car at present; hoping to get repairs done.  Her transportation struggles have impacted her ability to come to f/u visits and get proper testing, like lab work.  Her financial struggles have affected her taking her medications as well as transportation.      Studies Ordered at Todays Visit: none   Blood Work Ordered At Today’s visit: Lipid, CMP, A1C (as previously ordered, re-printed today)  Follow-Up: 3mo      DEN Crowley.

## 2023-03-13 ENCOUNTER — TELEPHONE (OUTPATIENT)
Dept: VASCULAR LAB | Facility: MEDICAL CENTER | Age: 43
End: 2023-03-13
Payer: COMMERCIAL

## 2023-03-13 NOTE — TELEPHONE ENCOUNTER
Contacted patient at [phone] to discuss [facility] and services/benefits offered. No answer, left voicemail.    Ella Zheng  Rx Coordinator   [phone]

## 2023-03-27 DIAGNOSIS — E11.9 TYPE 2 DIABETES MELLITUS WITHOUT COMPLICATION, WITHOUT LONG-TERM CURRENT USE OF INSULIN (HCC): ICD-10-CM

## 2023-03-27 RX ORDER — METFORMIN HYDROCHLORIDE 500 MG/1
500 TABLET, EXTENDED RELEASE ORAL DAILY
Qty: 90 TABLET | Refills: 3 | Status: SHIPPED | OUTPATIENT
Start: 2023-03-27 | End: 2023-06-19

## 2023-06-06 DIAGNOSIS — E11.9 TYPE 2 DIABETES MELLITUS WITHOUT COMPLICATION, WITHOUT LONG-TERM CURRENT USE OF INSULIN (HCC): ICD-10-CM

## 2023-06-06 DIAGNOSIS — E78.5 DYSLIPIDEMIA: ICD-10-CM

## 2023-06-06 DIAGNOSIS — I10 ESSENTIAL HYPERTENSION: ICD-10-CM

## 2023-06-19 ENCOUNTER — NON-PROVIDER VISIT (OUTPATIENT)
Dept: VASCULAR LAB | Facility: MEDICAL CENTER | Age: 43
End: 2023-06-19
Attending: INTERNAL MEDICINE
Payer: COMMERCIAL

## 2023-06-19 ENCOUNTER — DOCUMENTATION (OUTPATIENT)
Dept: VASCULAR LAB | Facility: MEDICAL CENTER | Age: 43
End: 2023-06-19
Payer: COMMERCIAL

## 2023-06-19 ENCOUNTER — APPOINTMENT (OUTPATIENT)
Dept: VASCULAR LAB | Facility: MEDICAL CENTER | Age: 43
End: 2023-06-19
Payer: COMMERCIAL

## 2023-06-19 VITALS
DIASTOLIC BLOOD PRESSURE: 81 MMHG | SYSTOLIC BLOOD PRESSURE: 116 MMHG | BODY MASS INDEX: 35 KG/M2 | WEIGHT: 223 LBS | HEIGHT: 67 IN | HEART RATE: 114 BPM

## 2023-06-19 DIAGNOSIS — F17.219 CIGARETTE NICOTINE DEPENDENCE WITH NICOTINE-INDUCED DISORDER: ICD-10-CM

## 2023-06-19 DIAGNOSIS — E78.1 HYPERTRIGLYCERIDEMIA: ICD-10-CM

## 2023-06-19 DIAGNOSIS — E78.5 DYSLIPIDEMIA: ICD-10-CM

## 2023-06-19 DIAGNOSIS — E11.9 TYPE 2 DIABETES MELLITUS WITHOUT COMPLICATION, WITHOUT LONG-TERM CURRENT USE OF INSULIN (HCC): ICD-10-CM

## 2023-06-19 DIAGNOSIS — I10 ESSENTIAL HYPERTENSION: ICD-10-CM

## 2023-06-19 PROCEDURE — 99213 OFFICE O/P EST LOW 20 MIN: CPT

## 2023-06-19 PROCEDURE — 99214 OFFICE O/P EST MOD 30 MIN: CPT | Performed by: INTERNAL MEDICINE

## 2023-06-19 PROCEDURE — 3074F SYST BP LT 130 MM HG: CPT | Performed by: INTERNAL MEDICINE

## 2023-06-19 PROCEDURE — 99212 OFFICE O/P EST SF 10 MIN: CPT

## 2023-06-19 PROCEDURE — 3079F DIAST BP 80-89 MM HG: CPT | Performed by: INTERNAL MEDICINE

## 2023-06-19 RX ORDER — METFORMIN HYDROCHLORIDE 750 MG/1
750 TABLET, EXTENDED RELEASE ORAL DAILY
Qty: 90 TABLET | Refills: 0 | Status: SHIPPED | OUTPATIENT
Start: 2023-06-19 | End: 2023-09-07 | Stop reason: SDUPTHER

## 2023-06-19 RX ORDER — DULAGLUTIDE 1.5 MG/.5ML
0.5 INJECTION, SOLUTION SUBCUTANEOUS
Qty: 6 ML | Refills: 0 | Status: SHIPPED | OUTPATIENT
Start: 2023-06-19 | End: 2023-09-18 | Stop reason: SDUPTHER

## 2023-06-19 RX ORDER — DAPAGLIFLOZIN 10 MG/1
10 TABLET, FILM COATED ORAL DAILY
Qty: 90 TABLET | Refills: 0 | Status: SHIPPED | OUTPATIENT
Start: 2023-06-19 | End: 2023-06-23

## 2023-06-19 NOTE — PROGRESS NOTES
"[facility] - Follow-Up   Date of Service: 23    Here for vascular medicine follow-up    Subjective    HPI:   Here for follow-up of hypertension, dyslipidemia, and diabetes  As pharmacotherapy visit later today  Denies cardiovascular complaints  No abdominal pain or other symptoms consistent with pancreatitis  She is back on all her medicines which she appears to be tolerating    SOCIAL HISTORY   Social History     Tobacco Use   Smoking Status Some Days    Packs/day: 0.00    Types: Cigarettes   Smokeless Tobacco Never   Smokin-10 cig/day; says she's decreasing over time.  Has quit in the past  Change in weight: none  Exercise -relatively active  Diet -trying to begin to decrease carbohydrates        Objective      Vitals:    23 1434   BP: 116/81   BP Location: Left arm   Patient Position: Sitting   BP Cuff Size: Large adult   Pulse: (!) 114   Weight: 101 kg (223 lb)   Height: 1.702 m (5' 7\")       Physical Exam  Vitals reviewed.   Constitutional:       Appearance: Normal appearance. She is well-developed.   HENT:      Head: Normocephalic and atraumatic.   Neck:      Vascular: No carotid bruit.   Cardiovascular:      Rate and Rhythm: Normal rate and regular rhythm.      Heart sounds: Normal heart sounds. No murmur heard.  Pulmonary:      Effort: Pulmonary effort is normal. No respiratory distress.      Breath sounds: No wheezing or rales.   Abdominal:      Tenderness: There is no abdominal tenderness.   Musculoskeletal:      Right lower leg: No edema.      Left lower leg: No edema.   Skin:     General: Skin is warm and dry.      Coloration: Skin is not pale.   Neurological:      General: No focal deficit present.      Mental Status: She is alert and oriented to person, place, and time. Mental status is at baseline.      Cranial Nerves: No cranial nerve deficit.   Psychiatric:         Mood and Affect: Mood normal.         Behavior: Behavior normal.       DATA REVIEW:    Blood work " 2023  Glucose 173  BUN 17, creatinine 0.77, GFR 99  Total cholesterol 177, triglycerides 567, LDL 61  A1c is 8.3    VASCULAR IMAGING:     Stress echo 8/27/19  Good functional capacity.  negative stress echo.          1. Type 2 diabetes mellitus without complication, without long-term current use of insulin (HCC)  Comp Metabolic Panel    HEMOGLOBIN A1C    MICROALBUMIN CREAT RATIO URINE      2. Dyslipidemia  Comp Metabolic Panel    Lipid Profile    TSH      3. Essential hypertension  Comp Metabolic Panel    MICROALBUMIN CREAT RATIO URINE      4. Hypertriglyceridemia        5. Cigarette nicotine dependence with nicotine-induced disorder          ASSESSMENT AND PLAN  Patient Type, check all that apply:   Primary Prevention, Type 2 Diabetes Mellitus and Metabolic Syndrome     Lipid management  ACC AHA goal LDL less than 100  Mostly characterized by hypertriglyceridemia  No known history of pancreatitis  LDL at goal  Triglycerides and non-HDL improved but not at target  Benefits of multiple lipid-lowering medications outweighs risk  Plan:  -Continue atorvastatin  -Continue fenofibrate  -Continue icosapent ethyl  -Diet and exercise as per below  -Recheck lipid panel prior to next visit    Blood pressure management  ACC AHA goal less than 130/80  At goal at least in the office  GFR and electrolytes stable  Plan:  -Continue losartan HCT  -Encouraged home blood pressure monitoring  -Recheck GFR, electrolytes, urine for albumin prior to next visit    Diabetes/impaired fasting glucose - Type 2 DM  Goal A1c less than 7  Control improving but above target still  Just recently started back on medications  -Continue current medications  -Follow-up in diabetes pharmacotherapy clinic  -Recheck A1c and urine for albumin prior to next visit    Antiplatelet anticoagulant therapy -not currently indicated    Tobacco use -  current everyday smoker  She is really decreased her alcohol use  She is under a lot of stress and does not find it a  good time to establish a quit date  -We will try to decrease to no more than 5 cigarettes a day  -Nicotine gum or lozenge as needed  -Consider quit date at next visit  - continue to evaluate at every visit    Exercise therapy -continue much improved exercise habits.  Try to do something every day    Weight management and eating plan -discussed low simple carbohydrates and low overall fat.  High protein and high complex carbohydrates.  I offered her referral to a dietitian but she is limited by transportation issues at the present time.  Hopefully as we escalate the dose of her SGLT2 we will begin to see considerable weight loss.  Continue to limit alcohol    Studies Ordered at Todays Visit: none   Blood Work Ordered At Today’s visit: As above prior to next visit  Follow-Up: 2 mo    Time: 36 min - chart review/prep, review of other providers' records, imaging/lab review, face-to-face time for history/examination, ordering, prescribing,  review of results/meds/ treatment plan with patient/family/caregiver, documentation in EMR, care coordination (as needed)       Michael J Bloch, M.D.    Cc: JAMES Pollock

## 2023-06-19 NOTE — PROGRESS NOTES
Left message to r/s appt with Vascular NP on 06/19.   Please r/s for next available with Vascular nurse.

## 2023-06-19 NOTE — PROGRESS NOTES
Called and requested most recent A1C results from [facility] to be faxed to our vascular office.  Pending response.

## 2023-06-19 NOTE — PROGRESS NOTES
Patient Consult Note      Primary care physician: Ana Lilia Pollock M.D.    Reason for consult: Management of Uncontrolled Type 2 Diabetes    HPI:  Gladys Lombardi is a 42 y.o. old patient who comes in today for evaluation of above stated problem.    Most Recent HbA1c and POCT glucose:   Lab Results   Component Value Date/Time    HBA1C 8 (A) 06/07/2022 12:00 AM            Most Recent Scr:  Lab Results   Component Value Date/Time    CREATININE 0.81 10/08/2021 07:35 AM    CREATININE 0.69 04/14/2018 02:01 AM        Current Diabetes Medication Regimen  Metformin:  mg every night   GLP-1 Agent: trulicity 1.5 mg weekly (Saturday)   SGLT2-I: farxiga 10 mg daily    Previous Diabetes Medications and Reason for Discontinuation  Metformin IR - GI upset  Piolgitazone - de-escalation  Glipizide - de-escalation  Januvia - de-escalation    Pt has home glucometer and proper testing technique - yes, has cgm. Last checked 3 months.  Discussed BG Goals: FBG 80 - 130, 2hPP < 180, A1c < 7%    Pt reports blood sugars:   N/A    Hypoglycemia awareness - yes  Nocturnal hypoglycemia- yes, ~1 week ago  Hypoglycemia:  Frequent, reports symptoms at work, levels do not match    Pt's treatment of Hypoglycemia - pretzels, cheese stick, beef sticks, grapefruit - 15:15 Rule   - clarified with pt she has cheese stick and meat stick in addition to carbs.    Current Exercise - yes, walking 4-5 days per week about 15 minutes  Exercise Goal - Would like to walk without being sore (muscle soreness)    Dietary - regular diet  Breakfast - jimmie whittington biscuit  Lunch - frozen dinner, sometimes smart meal or not  Dinner - lean pork, instant mash potatoes, frozen dinner, chilli with cheese  Snacks - none, only if feeling hypoglycemic  Drinks - water, diet coke, gatorade zero, orange juice (for hypoBG), coffee  EtOH - social ~every 3 months    Foot Exam:  Monofilament exam - unable to address, denies numbness or tingling    Preventative  Management  BP regimen (ACE/ARB) - losartan-hctz 100-25 mg  BP < 140/90 - yes  Statin - atorvastatin 40 mg daily,   LDL <100 - 10/2021, 53  Last Retinal Scan - pt endorses last done summer 2022. Sees ophthalmologist for glaucoma and eye pain  Last Microalbumin/Cr - 11/2019, 11.5  Last A1c -   Lab Results   Component Value Date/Time    HBA1C 8 (A) 06/07/2022 12:00 AM          Past Medical History:  Patient Active Problem List    Diagnosis Date Noted    BMI 36.0-36.9,adult 12/27/2019    Hypertriglyceridemia 12/27/2019    Metabolic syndrome 12/27/2019    Essential hypertension 02/21/2019    Dyslipidemia 02/21/2019    Type 2 diabetes mellitus without complication, without long-term current use of insulin ([facility]) 02/21/2019    Other chest pain 02/21/2019    MERINO (dyspnea on exertion) 02/21/2019    Depressive disorder 12/15/2008    Chalazion 10/23/2007    Tobacco use disorder 01/08/2007    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    Allergies:  Patient has no known allergies.    Social History:  Social History     Socioeconomic History    Marital status: Single     Spouse name: Not on file    Number of children: Not on file    Years of education: Not on file    Highest education level: Not on file   Occupational History    Not on file   Tobacco Use    Smoking status: Some Days     Packs/day: 0.00     Types: Cigarettes    Smokeless tobacco: Never   Substance and Sexual Activity    Alcohol use: Yes     Alcohol/week: 6.0 oz     Types: 10 Standard drinks or equivalent per week    Drug use: Yes     Types: Inhaled     Comment: marijuanna    Sexual activity: Not on file   Other Topics Concern    Not on file   Social History Narrative    Not on file     Social Determinants of Health     Financial Resource Strain: Not on  file   Food Insecurity: Not on file   Transportation Needs: Not on file   Physical Activity: Not on file   Stress: Not on file   Social Connections: Not on file   Intimate Partner Violence: Not on file   Housing Stability: Not on file       Family History:  No family history on file.    Medications:    Current Outpatient Medications:     metFORMIN ER (GLUCOPHAGE XR) 500 MG TABLET SR 24 HR, Take 1 Tablet by mouth every day., Disp: 90 Tablet, Rfl: 3    Diclofenac Sodium 1 % Cream, Apply  topically., Disp: , Rfl:     losartan-hydrochlorothiazide (HYZAAR) 100-25 MG per tablet, Take 1 Tablet by mouth every day., Disp: 90 Tablet, Rfl: 0    fenofibrate (TRICOR) 145 MG Tab, Take 1 Tablet by mouth every day., Disp: 90 Tablet, Rfl: 0    Icosapent Ethyl (VASCEPA) 1 g Cap, Take 2 Tablets by mouth 2 (two) times a day., Disp: 360 Capsule, Rfl: 0    Dulaglutide (TRULICITY) 1.5 MG/0.5ML Solution Pen-injector, Inject 1 PEN under the skin every 7 days., Disp: 6 mL, Rfl: 0    atorvastatin (LIPITOR) 40 MG Tab, 1 tab po qhs, Disp: 90 Tablet, Rfl: 0    dapagliflozin propanediol (FARXIGA) 10 MG Tab, Take 1 Tablet by mouth every day., Disp: 90 Tablet, Rfl: 0    brimonidine (ALPHAGAN) 0.2 % Solution, , Disp: , Rfl:     esomeprazole (NEXIUM) 40 MG delayed-release capsule, , Disp: , Rfl:     ibuprofen (MOTRIN) 200 MG Tab, , Disp: , Rfl:     timolol (TIMOPTIC) 0.25 % Solution, , Disp: , Rfl:     Continuous Blood Gluc Sensor (FREESTYLE BIB 14 DAY SENSOR) [facility], 1 Each every day. Use to test blood glucose daily as directed, Disp: 6 Each, Rfl: 3    buPROPion (WELLBUTRIN XL) 150 MG XL tablet, Take 150 mg by mouth every day., Disp: , Rfl:     ALBUTEROL INH, Inhale  by mouth., Disp: , Rfl:     Labs: Reviewed    Physical Examination:  Vital signs: There were no vitals taken for this visit. There is no height or weight on file to calculate BMI.    Assessment and Plan:    1. DM2  Basic physiology of DMII was explained to patient as well as  microvascular/macrovascular complications. The importance of increasing physical activity to improve diabetes control was discussed with the patient. Patient was also educated on changing diet and making better choices to help control blood sugar.   Discussed Goals: FBG 80 - 130, 2hPP < 180, a1c < 7.0%   Diabetes currently uncontrolled, BG elevated and latest A1c 8.0.  Medications  Metformin: tolerating well, can increase the dose. Pt is concerned of GI AEs as her work does not permit her enough breaks during the day. Recommended increasing dose to  mg nightly, pt would like to trial 750 mg first. Will increase conservatively.  Trulicity: Tolerating well, notices n/v the day after injecting. Would not increase dose until n/v resolved  Dapagliflozin: max dose, tolerating well  Lifestyle  Diet: Cooks for herself, finds it difficult to make proper portions. Discussed utilizing low carb alternatives when possible  Exercise: Pt notes that she gets sore from daily living activities (vacuuming). Advised pt to exercise to comfort level.  A1c due at next visit    - Medication changes:  Increase metformin to  mg daily  Continue trulicity  Continue dapagliflozin    - Lifestyle changes:  Diet: Discussed portion control and potential for substituting low carb alternatives when possible  Exercise: recommended achieving 90 minutes of exercise weekly    Follow Up:  1 months    Jeronimo Boss, PharmD    CC:   Ana Lilia Pollock M.D.  Allan Banuelos M.D.  [place]

## 2023-06-21 ENCOUNTER — TELEPHONE (OUTPATIENT)
Dept: VASCULAR LAB | Facility: MEDICAL CENTER | Age: 43
End: 2023-06-21
Payer: COMMERCIAL

## 2023-06-21 NOTE — TELEPHONE ENCOUNTER
Attempted to contact patient at [phone] to discuss [facility] and services/benefits offered. No answer, left voicemail.    Camryn Griffith

## 2023-06-23 DIAGNOSIS — I10 ESSENTIAL HYPERTENSION: ICD-10-CM

## 2023-06-23 DIAGNOSIS — E78.5 DYSLIPIDEMIA: ICD-10-CM

## 2023-06-23 DIAGNOSIS — E11.9 TYPE 2 DIABETES MELLITUS WITHOUT COMPLICATION, WITHOUT LONG-TERM CURRENT USE OF INSULIN (HCC): ICD-10-CM

## 2023-06-23 DIAGNOSIS — E88.810 METABOLIC SYNDROME: ICD-10-CM

## 2023-06-23 DIAGNOSIS — E78.1 HYPERTRIGLYCERIDEMIA: ICD-10-CM

## 2023-06-23 RX ORDER — DAPAGLIFLOZIN 10 MG/1
TABLET, FILM COATED ORAL
Qty: 90 TABLET | Refills: 0 | Status: SHIPPED | OUTPATIENT
Start: 2023-06-23 | End: 2023-09-07 | Stop reason: SDUPTHER

## 2023-06-23 RX ORDER — FENOFIBRATE 145 MG/1
TABLET, COATED ORAL
Qty: 90 TABLET | Refills: 0 | Status: SHIPPED | OUTPATIENT
Start: 2023-06-23 | End: 2023-09-07 | Stop reason: SDUPTHER

## 2023-06-23 RX ORDER — LOSARTAN POTASSIUM AND HYDROCHLOROTHIAZIDE 25; 100 MG/1; MG/1
TABLET ORAL
Qty: 90 TABLET | Refills: 0 | Status: SHIPPED | OUTPATIENT
Start: 2023-06-23 | End: 2023-09-07 | Stop reason: SDUPTHER

## 2023-06-23 RX ORDER — ICOSAPENT ETHYL 1000 MG/1
CAPSULE ORAL
Qty: 360 CAPSULE | Refills: 0 | Status: SHIPPED | OUTPATIENT
Start: 2023-06-23 | End: 2023-09-07 | Stop reason: SDUPTHER

## 2023-06-23 RX ORDER — ATORVASTATIN CALCIUM 40 MG/1
TABLET, FILM COATED ORAL
Qty: 90 TABLET | Refills: 0 | Status: SHIPPED | OUTPATIENT
Start: 2023-06-23 | End: 2023-09-07 | Stop reason: SDUPTHER

## 2023-07-31 ENCOUNTER — APPOINTMENT (OUTPATIENT)
Dept: VASCULAR LAB | Facility: MEDICAL CENTER | Age: 43
End: 2023-07-31
Payer: COMMERCIAL

## 2023-08-28 ENCOUNTER — TELEPHONE (OUTPATIENT)
Dept: VASCULAR LAB | Facility: MEDICAL CENTER | Age: 43
End: 2023-08-28
Payer: COMMERCIAL

## 2023-08-28 ENCOUNTER — DOCUMENTATION (OUTPATIENT)
Dept: PHARMACY | Facility: MEDICAL CENTER | Age: 43
End: 2023-08-28
Payer: COMMERCIAL

## 2023-08-28 NOTE — PROGRESS NOTES
Recieved request via [employer]    Ran Test Claim    $15 for 90t/90 days w/ automatic co-pay card  $15 for 30t/30 days w/ automatic co-pay card    Pharmacy on File  [facility] - [facility] - [facility] AT PORTAL TO [facility]ISTERED [facility] SITES    Sending to Outreach

## 2023-08-28 NOTE — TELEPHONE ENCOUNTER
[facility]    Received refill request for Trulicity.  However pt has missed their last apt.  Left VM with pt that we prefer to see them in clinic before refilling the medicine.     Gold Felix, PharmD

## 2023-09-01 ENCOUNTER — TELEPHONE (OUTPATIENT)
Dept: VASCULAR LAB | Facility: MEDICAL CENTER | Age: 43
End: 2023-09-01
Payer: COMMERCIAL

## 2023-09-01 ENCOUNTER — DOCUMENTATION (OUTPATIENT)
Dept: VASCULAR LAB | Facility: MEDICAL CENTER | Age: 43
End: 2023-09-01
Payer: COMMERCIAL

## 2023-09-01 LAB
ALBUMIN SERPL-MCNC: 4.5 G/DL (ref 3.9–4.9)
ALBUMIN/CREAT UR: 5 MG/G CREAT (ref 0–29)
ALBUMIN/GLOB SERPL: 2.4 {RATIO} (ref 1.2–2.2)
ALP SERPL-CCNC: 40 IU/L (ref 44–121)
ALT SERPL-CCNC: 23 IU/L (ref 0–32)
AST SERPL-CCNC: 18 IU/L (ref 0–40)
BILIRUB SERPL-MCNC: 0.4 MG/DL (ref 0–1.2)
BUN SERPL-MCNC: 19 MG/DL (ref 6–24)
BUN/CREAT SERPL: 25 (ref 9–23)
CALCIUM SERPL-MCNC: 9.4 MG/DL (ref 8.7–10.2)
CHLORIDE SERPL-SCNC: 104 MMOL/L (ref 96–106)
CHOLEST SERPL-MCNC: 174 MG/DL (ref 100–199)
CO2 SERPL-SCNC: 19 MMOL/L (ref 20–29)
CREAT SERPL-MCNC: 0.77 MG/DL (ref 0.57–1)
CREAT UR-MCNC: 145.4 MG/DL
EGFRCR SERPLBLD CKD-EPI 2021: 99 ML/MIN/1.73
GLOBULIN SER CALC-MCNC: 1.9 G/DL (ref 1.5–4.5)
GLUCOSE SERPL-MCNC: 168 MG/DL (ref 70–99)
HBA1C MFR BLD: 7.9 % (ref 4.8–5.6)
HDLC SERPL-MCNC: 26 MG/DL
LABORATORY COMMENT REPORT: ABNORMAL
LDLC SERPL CALC-MCNC: 45 MG/DL (ref 0–99)
MICROALBUMIN UR-MCNC: 7.3 UG/ML
POTASSIUM SERPL-SCNC: 4.1 MMOL/L (ref 3.5–5.2)
PROT SERPL-MCNC: 6.4 G/DL (ref 6–8.5)
SODIUM SERPL-SCNC: 138 MMOL/L (ref 134–144)
TRIGL SERPL-MCNC: 731 MG/DL (ref 0–149)
TSH SERPL DL<=0.005 MIU/L-ACNC: 1.1 UIU/ML (ref 0.45–4.5)
VLDLC SERPL CALC-MCNC: 103 MG/DL (ref 5–40)

## 2023-09-01 NOTE — PROGRESS NOTES
Recent lab from [facility] reviewed from 8/31/2023.  Notable for triglycerides 731, know hypertriglyceridemia.  Is on Vascepa and fenofibrate.  No known history of pancreatitis.  Will have RN call and make sure she is not having s/s pancreatitis and she is taking her medications.  Overdue for follow up.  Will have RN schedule follow up appt when able.  Linda VALENTIN  [facility]

## 2023-09-01 NOTE — TELEPHONE ENCOUNTER
----- Message from ALYSA Scott sent at 9/1/2023  2:25 PM PDT -----  Regarding: please call  Can you please call pt and see if she is having any signs of pancreatitis?    [facility]31!  She has been running high, but not that high (last ones we have 6/2023 were 567).  She should also be on vascepa and fenofibrate, make sure she is still taking.   She had an appt 7/31 and looks like she canceled it.  She will need a follow up appt as well.    Thanks!  Linda

## 2023-09-07 DIAGNOSIS — E78.5 DYSLIPIDEMIA: ICD-10-CM

## 2023-09-07 DIAGNOSIS — I10 ESSENTIAL HYPERTENSION: ICD-10-CM

## 2023-09-07 DIAGNOSIS — E78.1 HYPERTRIGLYCERIDEMIA: ICD-10-CM

## 2023-09-07 DIAGNOSIS — E88.810 METABOLIC SYNDROME: ICD-10-CM

## 2023-09-07 DIAGNOSIS — E11.9 TYPE 2 DIABETES MELLITUS WITHOUT COMPLICATION, WITHOUT LONG-TERM CURRENT USE OF INSULIN (HCC): ICD-10-CM

## 2023-09-07 NOTE — PROGRESS NOTES
S/w pt, she is asymptomatic for pancreatitis, her symptoms of abd pain and occ nausea are chronic. Scheduled f/u visit with [facility] med and pharmacy per pt request. She also requesting medication refills to get there to f/u appt. Advised would send request to MB

## 2023-09-08 RX ORDER — DAPAGLIFLOZIN 10 MG/1
10 TABLET, FILM COATED ORAL DAILY
Qty: 90 TABLET | Refills: 0 | Status: SHIPPED | OUTPATIENT
Start: 2023-09-08 | End: 2023-10-23

## 2023-09-08 RX ORDER — METFORMIN HYDROCHLORIDE 750 MG/1
750 TABLET, EXTENDED RELEASE ORAL DAILY
Qty: 90 TABLET | Refills: 0 | Status: SHIPPED | OUTPATIENT
Start: 2023-09-08 | End: 2023-10-23

## 2023-09-08 RX ORDER — ICOSAPENT ETHYL 1000 MG/1
2 CAPSULE ORAL
Qty: 360 CAPSULE | Refills: 0 | Status: SHIPPED | OUTPATIENT
Start: 2023-09-08 | End: 2023-10-23

## 2023-09-08 RX ORDER — FENOFIBRATE 145 MG/1
145 TABLET, COATED ORAL DAILY
Qty: 90 TABLET | Refills: 0 | Status: SHIPPED | OUTPATIENT
Start: 2023-09-08 | End: 2023-10-23

## 2023-09-08 RX ORDER — LOSARTAN POTASSIUM AND HYDROCHLOROTHIAZIDE 25; 100 MG/1; MG/1
1 TABLET ORAL DAILY
Qty: 90 TABLET | Refills: 0 | Status: SHIPPED | OUTPATIENT
Start: 2023-09-08 | End: 2023-10-23

## 2023-09-08 RX ORDER — ATORVASTATIN CALCIUM 40 MG/1
TABLET, FILM COATED ORAL
Qty: 90 TABLET | Refills: 0 | Status: SHIPPED | OUTPATIENT
Start: 2023-09-08 | End: 2023-10-23

## 2023-09-18 ENCOUNTER — NON-PROVIDER VISIT (OUTPATIENT)
Dept: VASCULAR LAB | Facility: MEDICAL CENTER | Age: 43
End: 2023-09-18
Attending: INTERNAL MEDICINE
Payer: COMMERCIAL

## 2023-09-18 VITALS
HEART RATE: 96 BPM | DIASTOLIC BLOOD PRESSURE: 83 MMHG | SYSTOLIC BLOOD PRESSURE: 116 MMHG | WEIGHT: 215 LBS | BODY MASS INDEX: 33.67 KG/M2

## 2023-09-18 DIAGNOSIS — E11.9 TYPE 2 DIABETES MELLITUS WITHOUT COMPLICATION, WITHOUT LONG-TERM CURRENT USE OF INSULIN (HCC): ICD-10-CM

## 2023-09-18 PROCEDURE — 99212 OFFICE O/P EST SF 10 MIN: CPT

## 2023-09-18 RX ORDER — DULAGLUTIDE 1.5 MG/.5ML
0.5 INJECTION, SOLUTION SUBCUTANEOUS
Qty: 6 ML | Refills: 0 | Status: SHIPPED | OUTPATIENT
Start: 2023-09-18 | End: 2023-10-23

## 2023-09-18 NOTE — TELEPHONE ENCOUNTER
Received Refill PA request via [employer]  for Trulicity 1.5mg/0.5ml Solution Pen-injector . (Quantity:6ml, Day Supply:84)     Insurance: [facility]   Member ID:  [id number]  BIN: [id number]  PCN: [id number]  Group: [id number]     Ran Test claim via [facility] medication Pays for a $25 for 28 or 84 DS copay. Will outreach to patient to offer specialty pharmacy services and or release to preferred pharmacy    LENNY Lake, PhT  Pharmacy Liaison  P: [phone]  9/18/2023 2:33 PM

## 2023-09-18 NOTE — PROGRESS NOTES
Patient Consult Note      Primary care physician: Ana Lilia Pollock M.D.    Reason for consult: Management of Uncontrolled Type 2 Diabetes    HPI:  Gladys Lombardi is a 42 y.o. old patient who comes in today for evaluation of above stated problem.    Most Recent HbA1c and POCT glucose:   Lab Results   Component Value Date/Time    HBA1C 7.9 (H) 08/31/2023 08:52 AM    HBA1C 8 (A) 06/07/2022 12:00 AM            Most Recent Scr:  Lab Results   Component Value Date/Time    CREATININE 0.77 08/31/2023 08:52 AM    CREATININE 0.69 04/14/2018 02:01 AM        Current Diabetes Medication Regimen  Metformin:  ER 750mg QD   GLP-1 Agent: Trulicity 1.5 Q7d   SGLT-2 Inhibitor: Farxiga 10mg QAM       Previous Diabetes Medications and Reason for Discontinuation:  (copied fwd)  Metformin IR - GI upset  Piolgitazone - unknown  Glipizide - unknown  Januvia - unknown     SMBG  Pt has home glucometer and proper testing technique - yes  Discussed BG Goals: FBG 80 - 130, 2hPP < 180, A1c < 7%    Pt reports blood sugars: has not been using CGM for about last month   Freestyle Joey- but was ave ~150 for the day   FBG -     Hypoglycemia  Hypoglycemia awareness - yes  Nocturnal hypoglycemia- denies  Hypoglycemia:  None    Pt's treatment of Hypoglycemia -  15:15 Rule    Lifestyle  Current Exercise - none  Exercise Goal - 150 min/wk     Dietary - common adult- working on portion control   Breakfast - jimmie juan carlos sausage/ham breakfast sandwich   Lunch - WW meal, frozen meals, cup-o-noodles, fast food (once a week)  Dinner - potstickers, rice, chili,   Snacks - pretzel snack, beef stick, apple sauce packets, pork rinds, granola  Drinks - coffee with 2% milk, 1/2 cup OJ     Foot Exam:  Monofilament exam - did not have time to discuss      Preventative Management  BP regimen (ACE/ARB) - Losartan/HCTZ 100-25mg QD  BP < 140/90 - yes- /83  Statin - Atorvastatin 40mg QD  LDL <100 - yes LDL = 45  Last Retinal Scan - did not  discuss  Last Microalbumin/Cr - 8/31/2023  Last A1c -   Lab Results   Component Value Date/Time    HBA1C 7.9 (H) 08/31/2023 08:52 AM    HBA1C 8 (A) 06/07/2022 12:00 AM        Updated caregaps    Past Medical History:  Patient Active Problem List    Diagnosis Date Noted    BMI 36.0-36.9,adult 12/27/2019    Hypertriglyceridemia 12/27/2019    Metabolic syndrome 12/27/2019    Essential hypertension 02/21/2019    Dyslipidemia 02/21/2019    Type 2 diabetes mellitus without complication, without long-term current use of insulin (HCC) 02/21/2019    Other chest pain 02/21/2019    MERINO (dyspnea on exertion) 02/21/2019    Depressive disorder 12/15/2008    Chalazion 10/23/2007    Tobacco use disorder 01/08/2007    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    Allergies:  Patient has no known allergies.    Social History:  Social History     Socioeconomic History    Marital status: Single     Spouse name: Not on file    Number of children: Not on file    Years of education: Not on file    Highest education level: Not on file   Occupational History    Not on file   Tobacco Use    Smoking status: Some Days     Types: Cigarettes    Smokeless tobacco: Never   Substance and Sexual Activity    Alcohol use: Yes     Alcohol/week: 6.0 oz     Types: 10 Standard drinks or equivalent per week    Drug use: Yes     Types: Inhaled     Comment: marijuanna    Sexual activity: Not on file   Other Topics Concern    Not on file   Social History Narrative    Not on file     Social Determinants of Health     Financial Resource Strain: Not on file   Food Insecurity: Not on file   Transportation Needs: Not on file   Physical Activity: Not on file   Stress: Not on file   Social Connections: Not on file   Intimate Partner Violence: Not on file    Housing Stability: Not on file       Family History:  No family history on file.    Medications:    Current Outpatient Medications:     Dulaglutide (TRULICITY) 1.5 MG/0.5ML Solution Pen-injector, Inject 0.5 mL under the skin every 7 days., Disp: 6 mL, Rfl: 0    dapagliflozin propanediol (FARXIGA) 10 MG Tab, Take 1 Tablet by mouth every day., Disp: 90 Tablet, Rfl: 0    fenofibrate (TRICOR) 145 MG Tab, Take 1 Tablet by mouth every day., Disp: 90 Tablet, Rfl: 0    losartan-hydrochlorothiazide (HYZAAR) 100-25 MG per tablet, Take 1 Tablet by mouth every day., Disp: 90 Tablet, Rfl: 0    atorvastatin (LIPITOR) 40 MG Tab, TAKE 1 TABLET AT BEDTIME, Disp: 90 Tablet, Rfl: 0    metFORMIN ER (GLUCOPHAGE XR) 750 MG TABLET SR 24 HR, Take 1 Tablet by mouth every day., Disp: 90 Tablet, Rfl: 0    Icosapent Ethyl 1 g Cap, Take 2 Capsules by mouth 2 times a day., Disp: 360 Capsule, Rfl: 0    Diclofenac Sodium 1 % Cream, Apply  topically., Disp: , Rfl:     brimonidine (ALPHAGAN) 0.2 % Solution, , Disp: , Rfl:     esomeprazole (NEXIUM) 40 MG delayed-release capsule, , Disp: , Rfl:     ibuprofen (MOTRIN) 200 MG Tab, , Disp: , Rfl:     timolol (TIMOPTIC) 0.25 % Solution, , Disp: , Rfl:     Continuous Blood Gluc Sensor (FREESTYLE BIB 14 DAY SENSOR) Misc, 1 Each every day. Use to test blood glucose daily as directed, Disp: 6 Each, Rfl: 3    buPROPion (WELLBUTRIN XL) 150 MG XL tablet, Take 150 mg by mouth every day., Disp: , Rfl:     ALBUTEROL INH, Inhale  by mouth., Disp: , Rfl:     Labs: Reviewed    Physical Examination:  Vital signs: /83   Pulse 96   Wt 97.5 kg (215 lb)   BMI 33.67 kg/m²  Body mass index is 33.67 kg/m².    Assessment and Plan:    1. DM2  Patient presents to clinic today to follow up on DM management  Discussed Goals: FBG 80 - 130, 2hPP < 180, a1c < 7.0%   Patient's most recent A1c was above goal (<7.0%) at 7.9% (8/31/23) which is improved from previous value of 8.0% (6/7/23)   Patient reports that she  "struggles with portion control and will be working on making small changes to help her control that better.   We discussed implementing the plate method, waiting 15 min after eating and drinking full glass of water before getting \"2nd helping\" and to have healthy protein based snacks readily available to curb appetite in between meals.   Discussed having smaller meals more often throughout the day as well and drinking lots of water and staying hydrated  Patient struggles with pain but aware she has room from improvement with exercise and movement and will start small by walking on days off for 10 min and building from there  Patient currently has room for titrating up on current regimen, but she is not ready to increase metformin (as her GI symptoms are just getting regulated) and not ready to increase Trulicity just yet (but will discuss at next follow up)  Patient will concentrate on lifestyle modifiable factors for now and will follow up again in 6 weeks.     - Medication changes:  None   - Continue:  Metformin 750mg QD   Trulicity 1.5mg Q7d  Farxiga 10mg QAM    - Lifestyle changes:  See above. Will maximize lean meats and veggies, implement plate method, work on portion control, healthy substitutions, waiting 15 min after meal before reaching for seconds, drinking extra water and preparing healthy snacks as well as eating smaller meals more often throughout the day  Patient will begin walking for 10 min on days off and will increase as tolerable    Follow Up:  6 weeks    Mary Munoz  PharmD      CC:   Ana Lilia Pollock M.D.  Allan Banuelos M.D.     "

## 2023-10-19 ENCOUNTER — HOSPITAL ENCOUNTER (OUTPATIENT)
Dept: RADIOLOGY | Facility: MEDICAL CENTER | Age: 43
End: 2023-10-19
Attending: FAMILY MEDICINE
Payer: COMMERCIAL

## 2023-10-19 DIAGNOSIS — Z12.31 VISIT FOR SCREENING MAMMOGRAM: ICD-10-CM

## 2023-10-19 PROCEDURE — 77063 BREAST TOMOSYNTHESIS BI: CPT

## 2023-10-30 ENCOUNTER — APPOINTMENT (OUTPATIENT)
Dept: VASCULAR LAB | Facility: MEDICAL CENTER | Age: 43
End: 2023-10-30
Payer: COMMERCIAL

## 2023-12-21 ENCOUNTER — DOCUMENTATION (OUTPATIENT)
Dept: VASCULAR LAB | Facility: MEDICAL CENTER | Age: 43
End: 2023-12-21
Payer: COMMERCIAL

## 2023-12-21 NOTE — PROGRESS NOTES
Mailed out lab work from Dr Bloch to patient's home address. Per patient request.    Perla Vee, Med Ass't  [facility]  Ph. [phone]  Fx. [phone]

## 2024-01-03 ENCOUNTER — OFFICE VISIT (OUTPATIENT)
Dept: URGENT CARE | Facility: CLINIC | Age: 44
End: 2024-01-03
Payer: COMMERCIAL

## 2024-01-03 ENCOUNTER — APPOINTMENT (OUTPATIENT)
Dept: RADIOLOGY | Facility: IMAGING CENTER | Age: 44
End: 2024-01-03
Attending: PHYSICIAN ASSISTANT
Payer: COMMERCIAL

## 2024-01-03 VITALS
HEART RATE: 120 BPM | RESPIRATION RATE: 22 BRPM | DIASTOLIC BLOOD PRESSURE: 72 MMHG | HEIGHT: 67 IN | WEIGHT: 215 LBS | SYSTOLIC BLOOD PRESSURE: 118 MMHG | OXYGEN SATURATION: 96 % | BODY MASS INDEX: 33.74 KG/M2 | TEMPERATURE: 97.1 F

## 2024-01-03 DIAGNOSIS — R06.02 SOB (SHORTNESS OF BREATH): ICD-10-CM

## 2024-01-03 DIAGNOSIS — R05.9 COUGH IN ADULT: ICD-10-CM

## 2024-01-03 DIAGNOSIS — B34.9 VIRAL ILLNESS: ICD-10-CM

## 2024-01-03 DIAGNOSIS — J45.21 MILD INTERMITTENT ASTHMA WITH ACUTE EXACERBATION: ICD-10-CM

## 2024-01-03 LAB
FLUAV RNA SPEC QL NAA+PROBE: NEGATIVE
FLUBV RNA SPEC QL NAA+PROBE: NEGATIVE
RSV RNA SPEC QL NAA+PROBE: NEGATIVE
SARS-COV-2 RNA RESP QL NAA+PROBE: NEGATIVE

## 2024-01-03 PROCEDURE — 3074F SYST BP LT 130 MM HG: CPT | Performed by: PHYSICIAN ASSISTANT

## 2024-01-03 PROCEDURE — 3078F DIAST BP <80 MM HG: CPT | Performed by: PHYSICIAN ASSISTANT

## 2024-01-03 PROCEDURE — 0241U POCT CEPHEID COV-2, FLU A/B, RSV - PCR: CPT | Performed by: PHYSICIAN ASSISTANT

## 2024-01-03 PROCEDURE — 99204 OFFICE O/P NEW MOD 45 MIN: CPT | Mod: 25 | Performed by: PHYSICIAN ASSISTANT

## 2024-01-03 PROCEDURE — 94640 AIRWAY INHALATION TREATMENT: CPT | Performed by: PHYSICIAN ASSISTANT

## 2024-01-03 PROCEDURE — 71046 X-RAY EXAM CHEST 2 VIEWS: CPT | Mod: TC | Performed by: RADIOLOGY

## 2024-01-03 RX ORDER — PREDNISONE 20 MG/1
TABLET ORAL
Qty: 10 TABLET | Refills: 0 | Status: SHIPPED | OUTPATIENT
Start: 2024-01-03

## 2024-01-03 RX ORDER — BENZONATATE 100 MG/1
100 CAPSULE ORAL 3 TIMES DAILY PRN
Qty: 30 CAPSULE | Refills: 0 | Status: SHIPPED | OUTPATIENT
Start: 2024-01-03

## 2024-01-03 RX ORDER — ALBUTEROL SULFATE 90 UG/1
2 AEROSOL, METERED RESPIRATORY (INHALATION) EVERY 6 HOURS PRN
Qty: 8.5 G | Refills: 0 | Status: SHIPPED | OUTPATIENT
Start: 2024-01-03

## 2024-01-03 RX ORDER — IPRATROPIUM BROMIDE AND ALBUTEROL SULFATE 2.5; .5 MG/3ML; MG/3ML
3 SOLUTION RESPIRATORY (INHALATION) ONCE
Status: COMPLETED | OUTPATIENT
Start: 2024-01-03 | End: 2024-01-03

## 2024-01-03 RX ORDER — DEXAMETHASONE SODIUM PHOSPHATE 10 MG/ML
10 INJECTION INTRAMUSCULAR; INTRAVENOUS ONCE
Status: COMPLETED | OUTPATIENT
Start: 2024-01-03 | End: 2024-01-03

## 2024-01-03 RX ADMIN — DEXAMETHASONE SODIUM PHOSPHATE 10 MG: 10 INJECTION INTRAMUSCULAR; INTRAVENOUS at 15:58

## 2024-01-03 RX ADMIN — IPRATROPIUM BROMIDE AND ALBUTEROL SULFATE 3 ML: 2.5; .5 SOLUTION RESPIRATORY (INHALATION) at 15:58

## 2024-01-03 ASSESSMENT — ENCOUNTER SYMPTOMS
FEVER: 1
SORE THROAT: 0
RHINORRHEA: 1
SHORTNESS OF BREATH: 0
DIZZINESS: 0
CHILLS: 1
HEADACHES: 1
PALPITATIONS: 0
WHEEZING: 1
COUGH: 1
CARDIOVASCULAR NEGATIVE: 1
MYALGIAS: 1
VOMITING: 0
NAUSEA: 0
DIARRHEA: 0
ABDOMINAL PAIN: 0

## 2024-01-03 NOTE — PROGRESS NOTES
Subjective     Gladys Lombardi is a very pleasant 43 y.o. female who presents with Cough (Productive cough, headache, x last night )            Cough  This is a new problem. The current episode started yesterday. The problem has been gradually worsening. The problem occurs every few minutes. The cough is Productive of sputum. Associated symptoms include chills, a fever, headaches, myalgias, nasal congestion, rhinorrhea and wheezing. Pertinent negatives include no chest pain, ear congestion, ear pain, postnasal drip, rash, sore throat or shortness of breath. She has tried a beta-agonist inhaler (NSAIDS) for the symptoms. The treatment provided mild relief. Her past medical history is significant for asthma. There is no history of bronchitis, emphysema or pneumonia.       PMH:  has a past medical history of Anxiety, Asthma, Diabetes (HCC), Dyslipidemia, and Hypertension.  MEDS:   Current Outpatient Medications:     predniSONE (DELTASONE) 20 MG Tab, Take 2 tabs PO QD x 5 days, Disp: 10 Tablet, Rfl: 0    albuterol 108 (90 Base) MCG/ACT Aero Soln inhalation aerosol, Inhale 2 Puffs every 6 hours as needed for Shortness of Breath., Disp: 8.5 g, Rfl: 0    benzonatate (TESSALON) 100 MG Cap, Take 1 Capsule by mouth 3 times a day as needed for Cough., Disp: 30 Capsule, Rfl: 0    FARXIGA 10 MG Tab, TAKE 1 TABLET DAILY, Disp: 90 Tablet, Rfl: 0    atorvastatin (LIPITOR) 40 MG Tab, TAKE 1 TABLET AT BEDTIME, Disp: 90 Tablet, Rfl: 0    TRULICITY 1.5 MG/0.5ML Solution Pen-injector, INJECT 0.5ML (=1.5 MG)     SUBCUTANEOUSLY EVERY 7 DAYS, Disp: 6 mL, Rfl: 0    fenofibrate (TRICOR) 145 MG Tab, TAKE 1 TABLET DAILY, Disp: 90 Tablet, Rfl: 0    Icosapent Ethyl 1 g Cap, TAKE 2 CAPSULES TWICE DAILY, Disp: 360 Capsule, Rfl: 0    metFORMIN ER (GLUCOPHAGE XR) 750 MG TABLET SR 24 HR, TAKE 1 TABLET DAILY, Disp: 90 Tablet, Rfl: 0    losartan-hydrochlorothiazide (HYZAAR) 100-25 MG per tablet, TAKE 1 TABLET DAILY, Disp: 90 Tablet, Rfl: 0     "brimonidine (ALPHAGAN) 0.2 % Solution, , Disp: , Rfl:     esomeprazole (NEXIUM) 40 MG delayed-release capsule, , Disp: , Rfl:     ibuprofen (MOTRIN) 200 MG Tab, , Disp: , Rfl:     timolol (TIMOPTIC) 0.25 % Solution, , Disp: , Rfl:     Continuous Blood Gluc Sensor (FREESTYLE BIB 14 DAY SENSOR) Misc, 1 Each every day. Use to test blood glucose daily as directed, Disp: 6 Each, Rfl: 3    buPROPion (WELLBUTRIN XL) 150 MG XL tablet, Take 150 mg by mouth every day., Disp: , Rfl:   ALLERGIES: No Known Allergies  SURGHX: History reviewed. No pertinent surgical history.  SOCHX:  reports that she has been smoking cigarettes. She has never used smokeless tobacco. She reports current alcohol use of about 6.0 oz of alcohol per week. She reports current drug use. Drug: Inhaled.  FH: family history is not on file.      Review of Systems   Constitutional:  Positive for chills, fever and malaise/fatigue.   HENT:  Positive for congestion and rhinorrhea. Negative for ear pain, postnasal drip and sore throat.    Respiratory:  Positive for cough and wheezing. Negative for shortness of breath.    Cardiovascular: Negative.  Negative for chest pain, palpitations and leg swelling.   Gastrointestinal:  Negative for abdominal pain, diarrhea, nausea and vomiting.   Genitourinary: Negative.    Musculoskeletal:  Positive for myalgias.   Skin:  Negative for rash.   Neurological:  Positive for headaches. Negative for dizziness.       Medications, Allergies, and current problem list reviewed today in Epic           Objective     /72   Pulse (!) 120   Temp 36.2 °C (97.1 °F)   Resp (!) 22   Ht 1.702 m (5' 7\")   Wt 97.5 kg (215 lb)   SpO2 96%   BMI 33.67 kg/m²      Physical Exam  Vitals and nursing note reviewed.   Constitutional:       General: She is not in acute distress.     Appearance: Normal appearance. She is well-developed. She is not ill-appearing, toxic-appearing or diaphoretic.   HENT:      Head: Normocephalic and atraumatic. "      Right Ear: Tympanic membrane, ear canal and external ear normal.      Left Ear: Tympanic membrane, ear canal and external ear normal.      Nose: Congestion and rhinorrhea present.      Mouth/Throat:      Mouth: Mucous membranes are moist.      Pharynx: No oropharyngeal exudate or posterior oropharyngeal erythema.   Eyes:      General:         Right eye: No discharge.         Left eye: No discharge.      Conjunctiva/sclera: Conjunctivae normal.   Cardiovascular:      Rate and Rhythm: Regular rhythm. Tachycardia present.      Pulses: Normal pulses.      Heart sounds: Normal heart sounds.   Pulmonary:      Effort: Pulmonary effort is normal. No respiratory distress.      Breath sounds: Decreased breath sounds and wheezing present. No rhonchi or rales.      Comments: Productive bronchial cough with bronchospasm  Musculoskeletal:      Cervical back: Normal range of motion and neck supple.      Right lower leg: No edema.      Left lower leg: No edema.   Lymphadenopathy:      Cervical: No cervical adenopathy.   Skin:     General: Skin is warm and dry.   Neurological:      General: No focal deficit present.      Mental Status: She is alert and oriented to person, place, and time. Mental status is at baseline.   Psychiatric:         Mood and Affect: Mood normal.         Behavior: Behavior normal.         Thought Content: Thought content normal.         Judgment: Judgment normal.                             Assessment & Plan     This is a very pleasant 43-year-old female presenting with fever, chills and bodyaches for the last 2 days.  Cough congestion with shortness of breath and wheezing.  No chest pain, palpitations or leg swelling.  Eating and drinking normal without vomiting or diarrhea, normal urine output.  Possible sick contacts noted at work.  Patient does have a history of mild asthma for which she uses albuterol.  No other pertinent past respiratory history.  Patient is tachycardic and tachypneic.  pO2 is  adequate.  Nasal congestion and rhinorrhea seen.  She does have decreased breath sounds with scattered aspiratory wheezing, productive cough and bronchospasm.  No rhonchi or rales.  Remainder of exam unremarkable.  Chest x-ray negative.  Patient given DuoNeb treatment and oral Decadron.  Post treatment auscultation shows significant improvement in her bronchospasm and breath sounds.  In clinic COVID, flu, RSV testing negative.  No clinical symptoms/signs of focal bacterial infection.  Patient will be treated for self-limiting viral URI with concurrent asthma exacerbation with OTC meds, conservative measures, and symptomatic relief.  ER and red flag symptoms discussed at length.      1. Cough in adult  POCT CEPHEID COV-2, FLU A/B, RSV - PCR    DX-CHEST-2 VIEWS      2. SOB (shortness of breath)  ipratropium-albuterol (DUONEB) nebulizer solution    dexamethasone (Decadron) injection (check route below) 10 mg    DX-CHEST-2 VIEWS      3. Viral illness  predniSONE (DELTASONE) 20 MG Tab    albuterol 108 (90 Base) MCG/ACT Aero Soln inhalation aerosol    benzonatate (TESSALON) 100 MG Cap      4. Mild intermittent asthma with acute exacerbation  predniSONE (DELTASONE) 20 MG Tab    albuterol 108 (90 Base) MCG/ACT Aero Soln inhalation aerosol    benzonatate (TESSALON) 100 MG Cap            I personally reviewed prior external notes and test results pertinent to today's visit. Return to clinic or go to ED if symptoms worsen or persist. Red flag symptoms and indications for ED discussed at length. Patient/Parent/Guardian voices understanding.  AVS with post-visit instructions provided or given verbally.  Follow-up with your primary care provider in 3-5 days. All side effects and potential interactions of prescribed medication discussed including allergic response, GI upset, tendon injury, rash, sedation, OCP effectiveness, etc.    Please note that this dictation was created using voice recognition software. I have made every  reasonable attempt to correct obvious errors, but I expect that there are errors of grammar and possibly content that I did not discover before finalizing the note.

## 2024-01-05 LAB
ALBUMIN SERPL-MCNC: 4.8 G/DL (ref 3.9–4.9)
ALBUMIN/CREAT UR: 14 MG/G CREAT (ref 0–29)
ALBUMIN/GLOB SERPL: 2.3 {RATIO} (ref 1.2–2.2)
ALP SERPL-CCNC: 49 IU/L (ref 44–121)
ALT SERPL-CCNC: 28 IU/L (ref 0–32)
AST SERPL-CCNC: 17 IU/L (ref 0–40)
BILIRUB SERPL-MCNC: 0.3 MG/DL (ref 0–1.2)
BUN SERPL-MCNC: 14 MG/DL (ref 6–24)
BUN/CREAT SERPL: 19 (ref 9–23)
CALCIUM SERPL-MCNC: 9.8 MG/DL (ref 8.7–10.2)
CHLORIDE SERPL-SCNC: 101 MMOL/L (ref 96–106)
CHOLEST SERPL-MCNC: 179 MG/DL (ref 100–199)
CO2 SERPL-SCNC: 21 MMOL/L (ref 20–29)
CREAT SERPL-MCNC: 0.75 MG/DL (ref 0.57–1)
CREAT UR-MCNC: 281.9 MG/DL
EGFRCR SERPLBLD CKD-EPI 2021: 101 ML/MIN/1.73
GLOBULIN SER CALC-MCNC: 2.1 G/DL (ref 1.5–4.5)
GLUCOSE SERPL-MCNC: 155 MG/DL (ref 70–99)
HBA1C MFR BLD: 7 % (ref 4.8–5.6)
HDLC SERPL-MCNC: 40 MG/DL
LABORATORY COMMENT REPORT: ABNORMAL
LDLC SERPL CALC-MCNC: 87 MG/DL (ref 0–99)
MICROALBUMIN UR-MCNC: 38.7 UG/ML
POTASSIUM SERPL-SCNC: 3.8 MMOL/L (ref 3.5–5.2)
PROT SERPL-MCNC: 6.9 G/DL (ref 6–8.5)
SODIUM SERPL-SCNC: 138 MMOL/L (ref 134–144)
TRIGL SERPL-MCNC: 315 MG/DL (ref 0–149)
TSH SERPL DL<=0.005 MIU/L-ACNC: 0.66 UIU/ML (ref 0.45–4.5)
VLDLC SERPL CALC-MCNC: 52 MG/DL (ref 5–40)

## 2024-01-25 ENCOUNTER — OFFICE VISIT (OUTPATIENT)
Dept: VASCULAR LAB | Facility: MEDICAL CENTER | Age: 44
End: 2024-01-25
Attending: INTERNAL MEDICINE
Payer: COMMERCIAL

## 2024-01-25 VITALS
HEART RATE: 102 BPM | SYSTOLIC BLOOD PRESSURE: 115 MMHG | BODY MASS INDEX: 32.33 KG/M2 | WEIGHT: 206 LBS | DIASTOLIC BLOOD PRESSURE: 79 MMHG | HEIGHT: 67 IN

## 2024-01-25 DIAGNOSIS — E78.5 DYSLIPIDEMIA: ICD-10-CM

## 2024-01-25 DIAGNOSIS — E11.9 TYPE 2 DIABETES MELLITUS WITHOUT COMPLICATION, WITHOUT LONG-TERM CURRENT USE OF INSULIN (HCC): ICD-10-CM

## 2024-01-25 DIAGNOSIS — E78.1 HYPERTRIGLYCERIDEMIA: ICD-10-CM

## 2024-01-25 DIAGNOSIS — F17.219 CIGARETTE NICOTINE DEPENDENCE WITH NICOTINE-INDUCED DISORDER: ICD-10-CM

## 2024-01-25 DIAGNOSIS — E88.810 METABOLIC SYNDROME: ICD-10-CM

## 2024-01-25 DIAGNOSIS — I10 ESSENTIAL HYPERTENSION: ICD-10-CM

## 2024-01-25 DIAGNOSIS — M79.10 MYALGIA: ICD-10-CM

## 2024-01-25 PROCEDURE — 3074F SYST BP LT 130 MM HG: CPT

## 2024-01-25 PROCEDURE — 3078F DIAST BP <80 MM HG: CPT

## 2024-01-25 PROCEDURE — 99212 OFFICE O/P EST SF 10 MIN: CPT

## 2024-01-25 PROCEDURE — 99214 OFFICE O/P EST MOD 30 MIN: CPT

## 2024-01-25 PROCEDURE — 99213 OFFICE O/P EST LOW 20 MIN: CPT

## 2024-01-25 RX ORDER — LOSARTAN POTASSIUM AND HYDROCHLOROTHIAZIDE 25; 100 MG/1; MG/1
1 TABLET ORAL DAILY
Qty: 90 TABLET | Refills: 3 | Status: SHIPPED | OUTPATIENT
Start: 2024-01-25

## 2024-01-25 RX ORDER — FENOFIBRATE 145 MG/1
145 TABLET, COATED ORAL DAILY
Qty: 90 TABLET | Refills: 3 | Status: SHIPPED | OUTPATIENT
Start: 2024-01-25

## 2024-01-25 RX ORDER — ICOSAPENT ETHYL 1000 MG/1
CAPSULE ORAL
Qty: 360 CAPSULE | Refills: 3 | Status: SHIPPED | OUTPATIENT
Start: 2024-01-25

## 2024-01-25 RX ORDER — ATORVASTATIN CALCIUM 40 MG/1
40 TABLET, FILM COATED ORAL
Qty: 90 TABLET | Refills: 3 | Status: SHIPPED | OUTPATIENT
Start: 2024-01-25

## 2024-01-25 NOTE — PROGRESS NOTES
"[facility] - Follow-Up   Date of Service: 01/25/2024    Here for vascular medicine follow-up    Subjective    HPI:   Here for follow-up of hypertension, dyslipidemia, and diabetes  Has pharmacotherapy visit today as well  Denies cardiovascular complaints, but does note her HR is fast, usually >100.  Denies irregularity, or feeling faint.  Typically low 100s  No abdominal pain or other symptoms consistent with pancreatitis  Remains on all medications, and describes good med adherence.  Home bps 110-120s/70s typically  Denies dizziness/lightheaded  Is having some leg cramping and muscle aches, reports have been going on for couple years, does not think it is related to statin, but not sure  Does not take vit d supplement  Has been working on changing diet - now looking at labels, trying to limit/reduce saturated fats, specifically cheese, and mexican food  Still smoking, down to 6 packs every 2 weeks, (previous 12/2 weeks)    SOCIAL HISTORY   Social History     Tobacco Use   Smoking Status Some Days    Types: Cigarettes   Smokeless Tobacco Never   Smoking: ~8 cig/day; says she's decreasing over time.  Has quit in the past  Change in weight: none  Exercise -relatively active  Diet -decreasing saturated fats, cheese, mexican foods        Objective      Vitals:    01/25/24 1548   BP: 115/79   BP Location: Left arm   Patient Position: Sitting   BP Cuff Size: Adult   Pulse: (!) 102   Weight: 93.4 kg (206 lb)   Height: 1.702 m (5' 7\")       Physical Exam  Vitals reviewed.   Constitutional:       Appearance: Normal appearance. She is well-developed.   HENT:      Head: Normocephalic and atraumatic.   Neck:      Vascular: No carotid bruit.   Cardiovascular:      Rate and Rhythm: Regular rhythm. Tachycardia present.      Heart sounds: Normal heart sounds. No murmur heard.  Pulmonary:      Effort: Pulmonary effort is normal. No respiratory distress.      Breath sounds: Wheezing present. No rales.   Abdominal:      " Tenderness: There is no abdominal tenderness.   Musculoskeletal:      Right lower leg: No edema.      Left lower leg: No edema.   Skin:     General: Skin is warm and dry.      Coloration: Skin is not pale.   Neurological:      General: No focal deficit present.      Mental Status: She is alert and oriented to person, place, and time. Mental status is at baseline.      Cranial Nerves: No cranial nerve deficit.   Psychiatric:         Mood and Affect: Mood normal.         Behavior: Behavior normal.       DATA REVIEW:    Blood work June 2023  Glucose 173  BUN 17, creatinine 0.77, GFR 99  Total cholesterol 177, triglycerides 567, LDL 61  A1c is 8.3    VASCULAR IMAGING:     Stress echo 8/27/19  Good functional capacity.  negative stress echo.          1. Myalgia  VITAMIN D,25 HYDROXY (DEFICIENCY)      2. Dyslipidemia  VITAMIN D,25 HYDROXY (DEFICIENCY)    Lipid Profile    Comp Metabolic Panel    APOLIPOPROTEIN B    atorvastatin (LIPITOR) 40 MG Tab    fenofibrate (TRICOR) 145 MG Tab    Icosapent Ethyl 1 g Cap      3. Type 2 diabetes mellitus without complication, without long-term current use of insulin ([facility])  Comp Metabolic Panel    HEMOGLOBIN A1C (Glycohemoglobin GHB Total/A1C with MBG Estimate)    MICROALBUMIN CREAT RATIO URINE    Icosapent Ethyl 1 g Cap      4. Hypertriglyceridemia  Lipid Profile    APOLIPOPROTEIN B    atorvastatin (LIPITOR) 40 MG Tab    fenofibrate (TRICOR) 145 MG Tab      5. Essential hypertension  Comp Metabolic Panel    MICROALBUMIN CREAT RATIO URINE    Icosapent Ethyl 1 g Cap    losartan-hydrochlorothiazide (HYZAAR) 100-25 MG per tablet      6. Cigarette nicotine dependence with nicotine-induced disorder        7. Metabolic syndrome  Icosapent Ethyl 1 g Cap      8. BMI 36.0-36.9,adult  Icosapent Ethyl 1 g Cap        ASSESSMENT AND PLAN  Patient Type, check all that apply:   Primary Prevention, Type 2 Diabetes Mellitus and Metabolic Syndrome     Lipid management  ACC AHA goal LDL less than  100  Mostly characterized by hypertriglyceridemia  No known history of pancreatitis  LDL at goal  Triglycerides and non-HDL much improved with dietary changes but not at target 1/2024  Benefits of multiple lipid-lowering medications outweighs risk  Plan:  -Continue atorvastatin, consider change to other statin to see if improvement in myalgias.  Also discussed possibility of statin holiday for 2 weeks to see if improvement, but pt declined at this time.  Can consider in future.    -Continue fenofibrate  -Continue icosapent ethyl  -START vit d 2000u/day  -Diet and exercise as per below  -Recheck lipid panel and vit d level prior to next visit    Blood pressure management  ACC AHA goal less than 130/80  At goal at least in the office  GFR and electrolytes stable  Plan:  -Continue losartan HCT  -Encouraged home blood pressure monitoring  -Recheck GFR, electrolytes, urine for albumin prior to next visit    Diabetes/impaired fasting glucose - Type 2 DM  Goal A1c < 7  Lab Results   Component Value Date    HBA1C 7.0 (H) 01/04/2024    HBA1C 8 (A) 06/07/2022      Lab Results   Component Value Date/Time    MICRALB 38.7 01/04/2024 08:58 AM   Control improving and A1c at goal today, 7.0 on most recent labs  Good adherence with all medications   Plan:  -Continue current medications  -Follow-up in diabetes pharmacotherapy clinic  -Recheck A1c and urine for albumin prior to next visit    Antiplatelet anticoagulant therapy -not currently indicated    Tobacco use -  current everyday smoker  She is really decreased her alcohol use  She is under a lot of stress and does not find it a good time to establish a quit date - again stated  -We will try to decrease to no more than 5 cigarettes a day - reviewed and encouraged  -Nicotine gum or lozenge as needed  -Consider quit date at next visit  - continue to evaluate at every visit    Exercise therapy -continue much improved exercise habits.  Try to do something every day    Weight  management and eating plan -discussed low simple carbohydrates and low overall fat.  High protein and high complex carbohydrates.  I offered her referral to a dietitian but she is limited by transportation issues at the present time.  Hopefully as we escalate the dose of her SGLT2 we will begin to see considerable weight loss.  Continue to limit alcohol    Studies Ordered at Todays Visit: none   Blood Work Ordered At Today’s visit: As above prior to next visit  Follow-Up: 6 mo, sooner prn          ALYSA Scott    Cc: JAMES Pollock       No

## 2024-01-26 DIAGNOSIS — E11.9 TYPE 2 DIABETES MELLITUS WITHOUT COMPLICATION, WITHOUT LONG-TERM CURRENT USE OF INSULIN (HCC): ICD-10-CM

## 2024-01-26 RX ORDER — DAPAGLIFLOZIN 10 MG/1
10 TABLET, FILM COATED ORAL DAILY
Qty: 90 TABLET | Refills: 1 | Status: SHIPPED | OUTPATIENT
Start: 2024-01-26

## 2024-01-26 RX ORDER — DULAGLUTIDE 1.5 MG/.5ML
0.5 INJECTION, SOLUTION SUBCUTANEOUS
Qty: 6 ML | Refills: 1 | Status: SHIPPED | OUTPATIENT
Start: 2024-01-26

## 2024-01-26 RX ORDER — METFORMIN HYDROCHLORIDE 750 MG/1
750 TABLET, EXTENDED RELEASE ORAL DAILY
Qty: 90 TABLET | Refills: 1 | Status: SHIPPED | OUTPATIENT
Start: 2024-01-26

## 2024-01-26 NOTE — PROGRESS NOTES
Patient Consult Note      Primary care physician: Ana Lilia Pollock M.D.    Reason for consult: Management of Uncontrolled Type 2 Diabetes    HPI:  Gladys Lombardi is a 42 y.o. old patient who comes in today for evaluation of above stated problem.    Most Recent HbA1c and POCT glucose:   Lab Results   Component Value Date/Time    HBA1C 7.0 (H) 01/04/2024 08:58 AM    HBA1C 8 (A) 06/07/2022 12:00 AM            Most Recent Scr:  Lab Results   Component Value Date/Time    CREATININE 0.75 01/04/2024 08:58 AM    CREATININE 0.69 04/14/2018 02:01 AM        Current Diabetes Medication Regimen  Metformin:  ER 750mg QD   GLP-1 Agent: Trulicity 1.5 Q7d   SGLT-2 Inhibitor: Farxiga 10mg QAM       Previous Diabetes Medications and Reason for Discontinuation:  (copied fwd)  Metformin IR - GI upset  Piolgitazone - unknown  Glipizide - unknown  Januvia - unknown     SMBG  Pt has home glucometer and proper testing technique - yes  Discussed BG Goals: FBG 80 - 130, 2hPP < 180, A1c < 7%    Pt reports blood sugars: has not been using CGM for about last month   Freestyle Joey- but was ave ~150 for the day   FBG -     Hypoglycemia  Hypoglycemia awareness - yes  Nocturnal hypoglycemia- denies  Hypoglycemia:  None    Pt's treatment of Hypoglycemia -  15:15 Rule    Lifestyle  Current Exercise - has not been doing recently - chores  Exercise Goal - 20 minutes per day to increase to 50 minutes 5 days weekly, resume yoga program    Dietary - common adult- working on portion control - trying to cut out bread  Breakfast - biscuit w/ turkey sausage sandwich  Lunch - WW meal, frozen meals, cup-o-noodles, fast food (once a week)  Dinner - potstickers, rice, chili,   Snacks - pretzel snack, beef stick, apple sauce packets, pork rinds, granola  Drinks - coffee with 2% milk, 1/2 cup OJ     Foot Exam:  Monofilament exam - did not have time to discuss      Preventative Management  BP regimen (ACE/ARB) - Losartan/HCTZ 100-25mg QD  BP <  140/90 - yes  Statin - Atorvastatin 40mg QD  LDL <100 - yes   Last Retinal Scan - reminded patient  Last Microalbumin/Cr - 8/31/2023  Last A1c -   Lab Results   Component Value Date/Time    HBA1C 7.0 (H) 01/04/2024 08:58 AM    HBA1C 8 (A) 06/07/2022 12:00 AM        Updated caregaps    Past Medical History:  Patient Active Problem List    Diagnosis Date Noted    BMI 36.0-36.9,adult 12/27/2019    Hypertriglyceridemia 12/27/2019    Metabolic syndrome 12/27/2019    Essential hypertension 02/21/2019    Dyslipidemia 02/21/2019    Type 2 diabetes mellitus without complication, without long-term current use of insulin (HCC) 02/21/2019    Other chest pain 02/21/2019    MERINO (dyspnea on exertion) 02/21/2019    Depressive disorder 12/15/2008    Chalazion 10/23/2007    Tobacco use disorder 01/08/2007    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    Allergies:  Patient has no known allergies.    Social History:  Social History     Socioeconomic History    Marital status: Single     Spouse name: Not on file    Number of children: Not on file    Years of education: Not on file    Highest education level: Not on file   Occupational History    Not on file   Tobacco Use    Smoking status: Some Days     Types: Cigarettes    Smokeless tobacco: Never   Substance and Sexual Activity    Alcohol use: Yes     Alcohol/week: 6.0 oz     Types: 10 Standard drinks or equivalent per week    Drug use: Yes     Types: Inhaled     Comment: latoya    Sexual activity: Not on file   Other Topics Concern    Not on file   Social History Narrative    Not on file     Social Determinants of Health     Financial Resource Strain: Not on file   Food Insecurity: Not on file   Transportation Needs: Not on file   Physical Activity: Not on file   Stress:  Not on file   Social Connections: Not on file   Intimate Partner Violence: Not on file   Housing Stability: Not on file       Family History:  No family history on file.    Medications:    Current Outpatient Medications:     atorvastatin (LIPITOR) 40 MG Tab, Take 1 Tablet by mouth at bedtime., Disp: 90 Tablet, Rfl: 3    fenofibrate (TRICOR) 145 MG Tab, Take 1 Tablet by mouth every day., Disp: 90 Tablet, Rfl: 3    Icosapent Ethyl 1 g Cap, TAKE 2 CAPSULES TWICE DAILY, Disp: 360 Capsule, Rfl: 3    losartan-hydrochlorothiazide (HYZAAR) 100-25 MG per tablet, Take 1 Tablet by mouth every day., Disp: 90 Tablet, Rfl: 3    predniSONE (DELTASONE) 20 MG Tab, Take 2 tabs PO QD x 5 days, Disp: 10 Tablet, Rfl: 0    albuterol 108 (90 Base) MCG/ACT Aero Soln inhalation aerosol, Inhale 2 Puffs every 6 hours as needed for Shortness of Breath., Disp: 8.5 g, Rfl: 0    benzonatate (TESSALON) 100 MG Cap, Take 1 Capsule by mouth 3 times a day as needed for Cough., Disp: 30 Capsule, Rfl: 0    FARXIGA 10 MG Tab, TAKE 1 TABLET DAILY, Disp: 90 Tablet, Rfl: 0    TRULICITY 1.5 MG/0.5ML Solution Pen-injector, INJECT 0.5ML (=1.5 MG)     SUBCUTANEOUSLY EVERY 7 DAYS, Disp: 6 mL, Rfl: 0    metFORMIN ER (GLUCOPHAGE XR) 750 MG TABLET SR 24 HR, TAKE 1 TABLET DAILY, Disp: 90 Tablet, Rfl: 0    brimonidine (ALPHAGAN) 0.2 % Solution, , Disp: , Rfl:     esomeprazole (NEXIUM) 40 MG delayed-release capsule, , Disp: , Rfl:     ibuprofen (MOTRIN) 200 MG Tab, , Disp: , Rfl:     timolol (TIMOPTIC) 0.25 % Solution, , Disp: , Rfl:     Continuous Blood Gluc Sensor (FREESTYLE BIB 14 DAY SENSOR) Mis, 1 Each every day. Use to test blood glucose daily as directed, Disp: 6 Each, Rfl: 3    buPROPion (WELLBUTRIN XL) 150 MG XL tablet, Take 150 mg by mouth every day., Disp: , Rfl:     Labs: Reviewed    Physical Examination:  Vital signs: There were no vitals taken for this visit. There is no height or weight on file to calculate BMI.    Assessment and Plan:    1.  DM2  Discussed Goals: FBG 80 - 130, 2hPP < 180, a1c < 7.0%   Patient's most recent A1c slightly above goal (<7.0%) at 7%  Patient having side effects to medications - will not increase at this time  Patient is having nausea mostly in the mornings (around the time she eats) but states it's fairly consistent - discussed strategies to decrease portion size, eat smaller meals more often. Focus on protein.   Diarrhea every morning, states it is consistent. Feels that it got worse with increase to 750 mg but is better with XR formulation. Discussed reducing dose vs keeping dose - patient would like to continue current dose and will try adding fiber to diet to help. She is also having a banana daily.   Discussed importance of improving exercise and lifestyle - patient is motivated to make improvements    - Medication changes:  None   - Continue:  Metformin 750mg QD   Trulicity 1.5mg Q7d  Farxiga 10mg QAM    - Lifestyle changes:  Will increase exercise to 20 minutes daily, and start yoga routine  Will start snacking on carrots/celery and humus, improve protein     Follow Up:  3 months    David SingletaryD    CC:   Ana Lilia Barr M.D.  Allan Banuelos M.D.

## 2024-01-29 ENCOUNTER — TELEPHONE (OUTPATIENT)
Dept: VASCULAR LAB | Facility: MEDICAL CENTER | Age: 44
End: 2024-01-29
Payer: COMMERCIAL

## 2024-01-29 NOTE — TELEPHONE ENCOUNTER
Received Refill PA request via [employer]  for FARXIGA 10MG TABLETS. (Quantity:90, Day Supply:90)     Insurance: RX [employer]  Member ID:  [id number]  BIN: [id number]  PCN: [id number]  Group: [id number]     Ran Test claim via [place] & medication Pays for a $89.80/90DS ; $15/30DS copay. Will outreach to patient to offer specialty pharmacy services and or release to preferred pharmacy    LENNY Lake, PhT  Pharmacy Liaison (Rx Coordinator)  P: [phone]  1/29/2024 12:07 PM

## 2024-04-11 ENCOUNTER — APPOINTMENT (OUTPATIENT)
Dept: VASCULAR LAB | Facility: MEDICAL CENTER | Age: 44
End: 2024-04-11
Payer: COMMERCIAL

## 2024-05-06 ENCOUNTER — NON-PROVIDER VISIT (OUTPATIENT)
Dept: VASCULAR LAB | Facility: MEDICAL CENTER | Age: 44
End: 2024-05-06
Attending: INTERNAL MEDICINE
Payer: COMMERCIAL

## 2024-05-06 NOTE — PROGRESS NOTES
Patient Consult Note    Primary care physician: Ana Lilia Barr M.D.    Reason for consult: Management of Controlled Type 2 Diabetes    HPI:  Gladys Lombardi is a 43 y.o. old patient who comes in today for evaluation of above stated problem.    Allergies  Patient has no known allergies.    Current Diabetes Medication Regimen  Metformin:  ER 750mg QD   GLP-1 Agent: Trulicity 1.5 Q7d   SGLT-2 Inhibitor: Farxiga 10mg QAM     Previous Diabetes Medications and Reason for Discontinuation  Metformin IR - GI upset  Piolgitazone - unknown  Glipizide - unknown  Januvia - unknown     Potential Barriers to Care:  Adherence: denies missed doses  Side effects: diarrhea, nauseated  Affordability: yes    SMBG  Pt has home glucometer and proper testing technique -   Discussed BG Goals: FBG 80 - 130, 2hPP < 180, A1c < 7.0%    Pt reports blood sugars:   Before Breakfast: 160s about a month ago    Hypoglycemia  Hypoglycemia awareness: Yes  Nocturnal hypoglycemia: None  Hypoglycemia:  None  Pt's treatment of Hypoglycemia  Discussed 15:15 Rule    Lifestyle  Current Exercise - walking daily    Dietary - common adult- working on portion control - trying to cut out bread  Breakfast - biscuit w/ turkey sausage sandwich  Lunch - WW meal, frozen meals, cup-o-noodles, fast food (once a week)  Dinner - potstickers, rice, chili,   Snacks - pretzel snack, beef stick, apple sauce packets, pork rinds, granola  Drinks - coffee with 2% milk, 1/2 cup OJ     Labs  Lab Results   Component Value Date/Time    HBA1C 7.0 (H) 01/04/2024 08:58 AM    HBA1C 8 (A) 06/07/2022 12:00 AM      Lab Results   Component Value Date/Time    SODIUM 138 01/04/2024 08:58 AM    SODIUM 133 (L) 04/14/2018 02:01 AM    POTASSIUM 3.8 01/04/2024 08:58 AM    POTASSIUM 3.6 04/14/2018 02:01 AM    CHLORIDE 101 01/04/2024 08:58 AM    CHLORIDE 102 04/14/2018 02:01 AM    CO2 21 01/04/2024 08:58 AM    CO2 21 04/14/2018 02:01 AM    GLUCOSE 155 (H) 01/04/2024 08:58 AM    GLUCOSE 189 (H)  "04/14/2018 02:01 AM    BUN 14 01/04/2024 08:58 AM    BUN 15 04/14/2018 02:01 AM    CREATININE 0.75 01/04/2024 08:58 AM    CREATININE 0.69 04/14/2018 02:01 AM    BUNCREATRAT 19 01/04/2024 08:58 AM     Lab Results   Component Value Date/Time    ALKPHOSPHAT 49 01/04/2024 08:58 AM    ALKPHOSPHAT 74 04/14/2018 02:01 AM    ASTSGOT 17 01/04/2024 08:58 AM    ASTSGOT 19 04/14/2018 02:01 AM    ALTSGPT 28 01/04/2024 08:58 AM    ALTSGPT 27 04/14/2018 02:01 AM    TBILIRUBIN 0.3 01/04/2024 08:58 AM    TBILIRUBIN 0.5 04/14/2018 02:01 AM    ALBUMIN 4.8 01/04/2024 08:58 AM    ALBUMIN 4.3 04/14/2018 02:01 AM      Lab Results   Component Value Date/Time    CHOLSTRLTOT 179 01/04/2024 08:58 AM    LDL 96 03/04/2020 11:10 AM    HDL 40 01/04/2024 08:58 AM    TRIGLYCERIDE 315 (H) 01/04/2024 08:58 AM       No results found for: \"MALBCRT\", \"MICROALBUR\"    Physical Examination:  Vital signs: There were no vitals taken for this visit. There is no height or weight on file to calculate BMI.    Assessment and Plan:    1. DM2  Discussed Goals: FBG 80 - 130, 2hPP < 180, a1c < 7.0%  Last a1c drawn on 5/6/24 was 6.9%, which is at goal  Patient has been experiencing some diarrhea and nausea, but currently does not want to change her medication regimen. Patient reported that she takes the Trulicity at night and then wakes up with nausea. Suggested that patient move to take the Trulicity in the morning to see if this improves her symptoms.  Discussed ways to mitigate nausea by switching when she takes it and also eating smaller, lower carb meals to hopefully see some symptom improvement.  Will not be increasing the dose of Trulicity at this time due to side effects currently being experienced  Patient will follow-up in three months    - Medication changes:  None   - Continue:  Metformin 750 mg ER daily  Trulicity 1.5 mg every 7 days  Farxiga 10 mg daily     - Lifestyle changes:  Exercise Goal - Continue current exercise and increase as " tolerated.  Dietary Goal - Focus on smaller meals with lower carbs.      Follow Up:  3 months    Chip Cano, Sulema    CC:   Ana Lilia Barr M.D.  Allan Banuelos M.D.

## 2024-07-29 DIAGNOSIS — I10 ESSENTIAL HYPERTENSION: ICD-10-CM

## 2024-08-05 ENCOUNTER — APPOINTMENT (OUTPATIENT)
Dept: VASCULAR LAB | Facility: MEDICAL CENTER | Age: 44
End: 2024-08-05
Payer: COMMERCIAL

## 2024-08-09 LAB
25(OH)D3+25(OH)D2 SERPL-MCNC: 22.8 NG/ML (ref 30–100)
ALBUMIN SERPL-MCNC: 4.5 G/DL (ref 3.9–4.9)
ALBUMIN/CREAT UR: 4 MG/G CREAT (ref 0–29)
ALP SERPL-CCNC: 44 IU/L (ref 44–121)
ALT SERPL-CCNC: 21 IU/L (ref 0–32)
APO B SERPL-MCNC: 98 MG/DL
AST SERPL-CCNC: 18 IU/L (ref 0–40)
BILIRUB SERPL-MCNC: 0.3 MG/DL (ref 0–1.2)
BUN SERPL-MCNC: 21 MG/DL (ref 6–24)
BUN/CREAT SERPL: 22 (ref 9–23)
CALCIUM SERPL-MCNC: 9.8 MG/DL (ref 8.7–10.2)
CHLORIDE SERPL-SCNC: 104 MMOL/L (ref 96–106)
CHOLEST SERPL-MCNC: 166 MG/DL (ref 100–199)
CO2 SERPL-SCNC: 20 MMOL/L (ref 20–29)
CREAT SERPL-MCNC: 0.94 MG/DL (ref 0.57–1)
CREAT UR-MCNC: 139.1 MG/DL
EGFRCR SERPLBLD CKD-EPI 2021: 77 ML/MIN/1.73
GLOBULIN SER CALC-MCNC: 2.3 G/DL (ref 1.5–4.5)
GLUCOSE SERPL-MCNC: 164 MG/DL (ref 70–99)
HBA1C MFR BLD: 6.6 % (ref 4.8–5.6)
HDLC SERPL-MCNC: 39 MG/DL
LDL CALC COMMENT:: ABNORMAL
LDLC SERPL CALC-MCNC: 85 MG/DL (ref 0–99)
MICROALBUMIN UR-MCNC: 5.5 UG/ML
POTASSIUM SERPL-SCNC: 4.3 MMOL/L (ref 3.5–5.2)
PROT SERPL-MCNC: 6.8 G/DL (ref 6–8.5)
SODIUM SERPL-SCNC: 138 MMOL/L (ref 134–144)
TRIGL SERPL-MCNC: 250 MG/DL (ref 0–149)
VLDLC SERPL CALC-MCNC: 42 MG/DL (ref 5–40)

## 2024-08-12 ENCOUNTER — APPOINTMENT (OUTPATIENT)
Dept: VASCULAR LAB | Facility: MEDICAL CENTER | Age: 44
End: 2024-08-12
Payer: COMMERCIAL

## 2024-08-22 ENCOUNTER — TELEPHONE (OUTPATIENT)
Dept: VASCULAR LAB | Facility: MEDICAL CENTER | Age: 44
End: 2024-08-22
Payer: COMMERCIAL

## 2024-08-22 NOTE — TELEPHONE ENCOUNTER
Established patient  Chart prep for upcoming appointment.    Any pending/incomplete orders from last visit? No, all orders completed.  Was patient called and reminded to complete pending orders? N/A orders complete  Were any records requested?  No    Referral up to date? Yes  Referral attached to appointment (renewals and New patients only)? N/A (established with up-to-date referral)  Virtual appointment? No    Perla Vee, Med Ass't  [facility]  Ph. [phone]  Fx. [phone]

## 2024-08-26 DIAGNOSIS — E11.9 TYPE 2 DIABETES MELLITUS WITHOUT COMPLICATION, WITHOUT LONG-TERM CURRENT USE OF INSULIN (HCC): ICD-10-CM

## 2024-08-27 RX ORDER — METFORMIN HYDROCHLORIDE 750 MG/1
750 TABLET, EXTENDED RELEASE ORAL DAILY
Qty: 90 TABLET | Refills: 1 | Status: SHIPPED | OUTPATIENT
Start: 2024-08-27

## 2024-08-27 RX ORDER — DULAGLUTIDE 1.5 MG/.5ML
INJECTION, SOLUTION SUBCUTANEOUS
Qty: 6 ML | Refills: 1 | Status: SHIPPED | OUTPATIENT
Start: 2024-08-27

## 2024-08-28 ENCOUNTER — OFFICE VISIT (OUTPATIENT)
Dept: VASCULAR LAB | Facility: MEDICAL CENTER | Age: 44
End: 2024-08-28
Attending: NURSE PRACTITIONER
Payer: COMMERCIAL

## 2024-08-28 ENCOUNTER — NON-PROVIDER VISIT (OUTPATIENT)
Dept: VASCULAR LAB | Facility: MEDICAL CENTER | Age: 44
End: 2024-08-28
Attending: INTERNAL MEDICINE
Payer: COMMERCIAL

## 2024-08-28 VITALS
BODY MASS INDEX: 31.23 KG/M2 | SYSTOLIC BLOOD PRESSURE: 108 MMHG | HEIGHT: 67 IN | DIASTOLIC BLOOD PRESSURE: 77 MMHG | HEART RATE: 98 BPM | WEIGHT: 199 LBS

## 2024-08-28 DIAGNOSIS — E11.9 TYPE 2 DIABETES MELLITUS WITHOUT COMPLICATION, WITHOUT LONG-TERM CURRENT USE OF INSULIN (HCC): ICD-10-CM

## 2024-08-28 DIAGNOSIS — I10 ESSENTIAL HYPERTENSION: ICD-10-CM

## 2024-08-28 DIAGNOSIS — E78.5 DYSLIPIDEMIA: ICD-10-CM

## 2024-08-28 DIAGNOSIS — E88.810 METABOLIC SYNDROME: ICD-10-CM

## 2024-08-28 PROCEDURE — 99214 OFFICE O/P EST MOD 30 MIN: CPT | Performed by: NURSE PRACTITIONER

## 2024-08-28 PROCEDURE — 99212 OFFICE O/P EST SF 10 MIN: CPT

## 2024-08-28 PROCEDURE — 3078F DIAST BP <80 MM HG: CPT | Performed by: NURSE PRACTITIONER

## 2024-08-28 PROCEDURE — 3074F SYST BP LT 130 MM HG: CPT | Performed by: NURSE PRACTITIONER

## 2024-08-28 PROCEDURE — 99212 OFFICE O/P EST SF 10 MIN: CPT | Performed by: PHARMACIST

## 2024-08-28 RX ORDER — BLOOD-GLUCOSE SENSOR
EACH MISCELLANEOUS
Qty: 6 EACH | Refills: 3 | Status: SHIPPED | OUTPATIENT
Start: 2024-08-28

## 2024-08-28 RX ORDER — BLOOD-GLUCOSE SENSOR
EACH MISCELLANEOUS
Qty: 6 EACH | Refills: 3 | Status: SHIPPED | OUTPATIENT
Start: 2024-08-28 | End: 2024-08-28 | Stop reason: CLARIF

## 2024-08-28 RX ORDER — ALPRAZOLAM 0.5 MG
TABLET ORAL NIGHTLY PRN
COMMUNITY
Start: 2024-04-01

## 2024-08-28 NOTE — PROGRESS NOTES
"[facility] - Follow-Up   Date of Service: 08/28/2024    Here for vascular medicine follow-up    Subjective    HPI:   Here for follow-up of hypertension, dyslipidemia, and diabetes  Saw pharmacist in DM clinic today  Down 16 lbs since last visit  Not checking home BP much  Denies dizziness or lightheadedness  Tolerating medications  Still smoking about 8 cig per day- no changes  Not taking Vit D supplement   Working hard on dietary changes- trying to cut out bread    SOCIAL HISTORY   Social History     Tobacco Use   Smoking Status Some Days    Types: Cigarettes   Smokeless Tobacco Never   Smoking: ~8 cig/day; says she's decreasing over time.    Change in weight: down 15 lbs from Jan 2024  Exercise -relatively active  Diet -decreasing saturated fats, cheese, mexican foods        Objective      Vitals:    08/28/24 1530   BP: 108/77   BP Location: Left arm   Patient Position: Sitting   BP Cuff Size: Large adult   Pulse: 98   Weight: 90.3 kg (199 lb)   Height: 1.702 m (5' 7\")     Physical Exam  Vitals reviewed.   Constitutional:       Appearance: Normal appearance. She is well-developed.   HENT:      Head: Normocephalic and atraumatic.   Neck:      Vascular: No carotid bruit.   Cardiovascular:      Rate and Rhythm: Regular rhythm. Tachycardia present.      Heart sounds: Normal heart sounds. No murmur heard.  Pulmonary:      Effort: Pulmonary effort is normal. No respiratory distress.      Breath sounds: No wheezing or rales.   Abdominal:      Tenderness: There is no abdominal tenderness.   Musculoskeletal:      Right lower leg: No edema.      Left lower leg: No edema.   Skin:     General: Skin is warm and dry.      Coloration: Skin is not pale.   Neurological:      General: No focal deficit present.      Mental Status: She is alert and oriented to person, place, and time. Mental status is at baseline.      Cranial Nerves: No cranial nerve deficit.   Psychiatric:         Mood and Affect: Mood normal.         " Behavior: Behavior normal.       DATA REVIEW:    Blood work June 2023  Glucose 173  BUN 17, creatinine 0.77, GFR 99  Total cholesterol 177, triglycerides 567, LDL 61  A1c is 8.3    VASCULAR IMAGING:     Stress echo 8/27/19  Good functional capacity.  negative stress echo.        1. Dyslipidemia  Lipoprotein (a)    Lipid Profile    APOLIPOPROTEIN B      2. Essential hypertension        3. Type 2 diabetes mellitus without complication, without long-term current use of insulin (HCC)        4. Metabolic syndrome          ASSESSMENT AND PLAN  Patient Type, check all that apply:   Primary Prevention, Type 2 Diabetes Mellitus and Metabolic Syndrome     Lipid management  ACC AHA goal LDL less than 100  Mostly characterized by hypertriglyceridemia  No known history of pancreatitis  LDL at goal  Triglycerides and non-HDL much improved with dietary changes but not at target 1/2024  Benefits of multiple lipid-lowering medications outweighs risk  Plan:  -Continue atorvastatin 40mg daily   -Continue fenofibrate  -Continue icosapent ethyl  -Continue vit d 2000u/day  -Diet and exercise as per below  -Recheck lipid panel, lipoprotein a, Apo B    Blood pressure management  ACC AHA goal less than 130/80  At goal at least in the office  GFR and electrolytes stable  Plan:  -Continue losartan HCT  -Encouraged home blood pressure monitoring-- check 1-2 times per week especially due to weight loss, may need to adjust medications if she continues to lose weight  -Recheck GFR, electrolytes, urine for albumin prior to next visit    Diabetes/impaired fasting glucose - Type 2 DM  Goal A1c < 7  Lab Results   Component Value Date    HBA1C 6.6 (H) 08/08/2024      Lab Results   Component Value Date/Time    MICRALB 5.5 08/08/2024 09:26 AM   Control improving and A1c at goal today, 7.0 on most recent labs  Good adherence with all medications   Plan:  -Continue current medications per DM clinic   -Follow-up in diabetes pharmacotherapy clinic  -Recheck  A1c and urine for albumin prior to next visit    Antiplatelet anticoagulant therapy -not currently indicated    Tobacco use - current everyday smoker; 8 cigs per day   She is really decreased her alcohol use  She is under a lot of stress and does not find it a good time to establish a quit date  Discussed today, but consider Chantix again at next visit    -Attempt to replaced nicotine gum with one cig per day   -Nicotine gum or lozenge as needed  -Consider quit date   -Consider Chantix if not successful by next visit   -Continue to evaluate at every visit    Exercise therapy -continue much improved exercise habits.  Try to do something every day    Weight management and eating plan - continue more healthy eating plan as discussed in DM clinic visit.  Continue to decrease simple carbohydrates.  Eat more high protein and high complex carbohydrates.  Hopefully as we escalate the dose of her SGLT2 we will begin to see considerable weight loss.  Continue to limit alcohol.     Studies Ordered at Todays Visit: none   Blood Work Ordered At Today’s visit: As above prior to next visit  Follow-Up: 6 mo, sooner eugenia Blackwell, STEPHIEPJAKE    Cc: JAMES Pollock

## 2024-08-28 NOTE — PROGRESS NOTES
Patient Consult Note - Follow Up Visit  Primary care physician: Ana Lilia Barr M.D.    Reason for consult: Management of Uncontrolled Type 2 Diabetes    HPI:  Gladys Lombardi is a 43 y.o. old patient who comes in today for evaluation of above stated problem.    Most Recent HbA1c:   Lab Results   Component Value Date/Time    HBA1C 6.6 (H) 08/08/2024 09:26 AM    HBA1C 8 (A) 06/07/2022 12:00 AM        Current Diabetes Medication Regimen  Metformin:  ER 750mg QD   GLP-1 Agent: Trulicity 1.5 Q7d   SGLT-2 Inhibitor: Farxiga 10mg QAM      Previous Diabetes Medications and Reason for Discontinuation  Metformin IR - GI upset  Piolgitazone - unknown  Glipizide - unknown  Januvia - unknown     Potential Barriers to Care:  Adherence: denies missed doses  Side effects: diarrhea, nauseated  Affordability: yes    Discussed FBG goal of , 2hrPP <180, and A1c <7.0%.  Before Breakfast:  CGM:  7d avr:  114  No other data available today, she states FBG typically ~80 and post prandial readings 140-160 depending on meal   Before Lunch:  Before Dinner:  Before Bedtime:  Other times:  Hypoglycemia:  None    Diet/Exercise:  Working on portion control.  Limiting simple carb intake (no bun on burger, removing filling from popstickers, etc).  Trying to incorporate healthier alternatives to current foods, such as turkey burgers in place of regular burger.    Exercise still limited to work around the house and short walks.   Limiting factor is back pain.    Previous visit note:  Current Exercise - walking daily     Dietary - common adult- working on portion control - trying to cut out bread  Breakfast - biscuit w/ turkey sausage sandwich  Lunch - WW meal, frozen meals, cup-o-noodles, fast food (once a week)  Dinner - potstickers, rice, chili,   Snacks - pretzel snack, beef stick, apple sauce packets, pork rinds, granola  Drinks - coffee with 2% milk, 1/2 cup OJ     Preventative Management  BP regimen (ACEi/ARB): Losartan-HCTZ 100-25mg  "QD  BP <140/90: Yes  Statin: atorvastatin (Lipitor) 40 mg daily  LDL <100: Yes  Lab Results   Component Value Date/Time    CHOLSTRLTOT 166 08/08/2024 09:26 AM    LDL 96 03/04/2020 11:10 AM    HDL 39 (L) 08/08/2024 09:26 AM    TRIGLYCERIDE 250 (H) 08/08/2024 09:26 AM       Last Microalbumin/Cr:  No results found for: \"MALBCRT\", \"MICROALBUR\"  Last A1c:  Lab Results   Component Value Date/Time    HBA1C 6.6 (H) 08/08/2024 09:26 AM    HBA1C 8 (A) 06/07/2022 12:00 AM      Last Retinal Scan: needs updating    Monofilament exam: needs updating, will defer to f/u visit     ROS:  Constitutional: No weight loss  Cardiac: No palpitations or racing heart  Resp: No shortness of breath  Neuro: No numbness or tinging in feet  Endo: No heat or cold intolerance, no polyuria or polydipsia  All other systems were reviewed and were negative.    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    Allergies:  Patient has no known allergies.    Social History:  Social History     Socioeconomic History    Marital status: Single     Spouse name: Not on file    Number of children: Not on file    Years of education: Not on file    Highest education level: Not on file   Occupational History    Not on file   Tobacco Use    Smoking status: " Some Days     Types: Cigarettes    Smokeless tobacco: Never   Substance and Sexual Activity    Alcohol use: Yes     Alcohol/week: 6.0 oz     Types: 10 Standard drinks or equivalent per week    Drug use: Yes     Types: Inhaled     Comment: latoya    Sexual activity: Not on file   Other Topics Concern    Not on file   Social History Narrative    Not on file     Social Determinants of Health     Financial Resource Strain: Not on file   Food Insecurity: Not on file   Transportation Needs: Not on file   Physical Activity: Not on file   Stress: Not on file   Social Connections: Not on file   Intimate Partner Violence: Not on file   Housing Stability: Not on file       Family History:  No family history on file.    Medications:    Current Outpatient Medications:     metFORMIN ER (GLUCOPHAGE XR) 750 MG TABLET SR 24 HR, TAKE 1 TABLET DAILY, Disp: 90 Tablet, Rfl: 1    TRULICITY 1.5 MG/0.5ML Solution Pen-injector, INJECT 0.5ML (=1.5 MG)     SUBCUTANEOUSLY EVERY 7 DAYS, Disp: 6 mL, Rfl: 1    FARXIGA 10 MG Tab, TAKE 1 TABLET DAILY, Disp: 90 Tablet, Rfl: 1    atorvastatin (LIPITOR) 40 MG Tab, Take 1 Tablet by mouth at bedtime., Disp: 90 Tablet, Rfl: 3    fenofibrate (TRICOR) 145 MG Tab, Take 1 Tablet by mouth every day., Disp: 90 Tablet, Rfl: 3    Icosapent Ethyl 1 g Cap, TAKE 2 CAPSULES TWICE DAILY, Disp: 360 Capsule, Rfl: 3    losartan-hydrochlorothiazide (HYZAAR) 100-25 MG per tablet, Take 1 Tablet by mouth every day., Disp: 90 Tablet, Rfl: 3    predniSONE (DELTASONE) 20 MG Tab, Take 2 tabs PO QD x 5 days, Disp: 10 Tablet, Rfl: 0    albuterol 108 (90 Base) MCG/ACT Aero Soln inhalation aerosol, Inhale 2 Puffs every 6 hours as needed for Shortness of Breath., Disp: 8.5 g, Rfl: 0    benzonatate (TESSALON) 100 MG Cap, Take 1 Capsule by mouth 3 times a day as needed for Cough., Disp: 30 Capsule, Rfl: 0    brimonidine (ALPHAGAN) 0.2 % Solution, , Disp: , Rfl:     esomeprazole (NEXIUM) 40 MG delayed-release capsule, , Disp: ,  Rfl:     ibuprofen (MOTRIN) 200 MG Tab, , Disp: , Rfl:     timolol (TIMOPTIC) 0.25 % Solution, , Disp: , Rfl:     Continuous Blood Gluc Sensor (FREESTYLE BIB 14 DAY SENSOR) Misc, 1 Each every day. Use to test blood glucose daily as directed, Disp: 6 Each, Rfl: 3    buPROPion (WELLBUTRIN XL) 150 MG XL tablet, Take 150 mg by mouth every day., Disp: , Rfl:     Labs: Reviewed    Physical Examination:  Vital signs: There were no vitals taken for this visit. There is no height or weight on file to calculate BMI.  General: No apparent distress, cooperative  Eyes: No scleral icterus or discharge  ENMT: Normal on external inspection of nose, lips, normal thyroid exam  Neck: No abnormal masses on inspection  Resp: Normal effort, clear to auscultation bilaterally   CVS: Regular rate and rhythm, S1 S2 normal, no murmur   Extremities: No edema  Abdomen: abdominal obesity present  Neuro: Alert and oriented  Skin: No rash  Psych: Normal mood and affect, intact memory and able to make informed decisions    Assessment and Plan:      1. DM2  Discussed Goals: FBG 80 - 130, 2hPP < 180, a1c < 7.0%  Last a1c drawn on 8/8/24 was 6.6%, which is at goal  Tolerating current dosing of all medications at this time.  Confirms no interval changes to other medications at this time.  As above, has been working on nutrition choices by decreasing portion sizes and choosing healthier alternatives to current everyday foods (turkey burgers for regular burgers, no bun)  Still using Bib 14-day CGM.  Sent updated prescription for Bib 3 to begin use once current sensor expires.  Will continue all current medications for now given adequate control.  Patient will follow-up in three months     - Medication changes:  None   - Continue:  Metformin 750 mg ER daily  Trulicity 1.5 mg every 7 days  Farxiga 10 mg daily      - Lifestyle changes:  Exercise Goal - Continue current exercise and increase as tolerated.  Dietary Goal - Focus on smaller meals with  lower carbs.    Follow Up:  Three months for A1c    Thank you for allowing me to participate in the care of this patient.    Esvin Valdivia, DavidD, [facility]  08/28/24    CC:   Ana Lilia Barr M.D.

## 2024-09-06 ENCOUNTER — OFFICE VISIT (OUTPATIENT)
Dept: NEUROLOGY | Facility: MEDICAL CENTER | Age: 44
End: 2024-09-06
Attending: PSYCHIATRY & NEUROLOGY
Payer: COMMERCIAL

## 2024-09-06 VITALS
OXYGEN SATURATION: 97 % | HEART RATE: 102 BPM | SYSTOLIC BLOOD PRESSURE: 116 MMHG | WEIGHT: 198.19 LBS | TEMPERATURE: 96.8 F | DIASTOLIC BLOOD PRESSURE: 72 MMHG | BODY MASS INDEX: 31.11 KG/M2 | HEIGHT: 67 IN | RESPIRATION RATE: 20 BRPM

## 2024-09-06 DIAGNOSIS — G43.709 CHRONIC MIGRAINE WITHOUT AURA WITHOUT STATUS MIGRAINOSUS, NOT INTRACTABLE: ICD-10-CM

## 2024-09-06 PROCEDURE — 99203 OFFICE O/P NEW LOW 30 MIN: CPT | Performed by: PSYCHIATRY & NEUROLOGY

## 2024-09-06 PROCEDURE — 3074F SYST BP LT 130 MM HG: CPT | Performed by: PSYCHIATRY & NEUROLOGY

## 2024-09-06 PROCEDURE — 3078F DIAST BP <80 MM HG: CPT | Performed by: PSYCHIATRY & NEUROLOGY

## 2024-09-06 PROCEDURE — 99212 OFFICE O/P EST SF 10 MIN: CPT | Performed by: PSYCHIATRY & NEUROLOGY

## 2024-09-06 RX ORDER — FLUTICASONE PROPIONATE 50 MCG
1 SPRAY, SUSPENSION (ML) NASAL DAILY
COMMUNITY

## 2024-09-06 NOTE — PROGRESS NOTES
"[facility] NEUROLOGY  GENERAL NEUROLOGY  NEW PATIENT VISIT    Referral source: Ana Lilia Barr MD    CC: \"other headache syndrome\"    HISTORY OF ILLNESS:  Gladys Lombardi is a 43 y.o. woman with a history most notable for asthma (uses an inhaler as needed), metabolic syndrome, HTN, HLD, T2DM, glaucoma, tobacco use, and depression.  Today, she was unaccompanied, and she provided the following history:    The following is a summary of headache symptoms, presented in my standard format:    Family History: brother w/ migraine  Age at onset (years): childhood  Location: top of head, bilateral, right eye, right eyebrow  Radiation: eye/eyebrow pain radiates upward  Frequency: baseline: daily, lately:   Duration: baseline: ~2 hours, lately:   Headache Days/Month: baseline: 30/03, lately:   Quality: ache  Intensity: baseline: 3-8/10, lately:   Aura: none  Photophobia/Phonophobia/Nausea/Vomiting: yes/yes/yes/no  Provoked by Physical Activity?:   Triggers: work (stares at 3 screens, lowered the brightness), hunger  Associated Symptoms:   Autonomic Signs (such as ptosis, miosis, conjunctival injection, rhinorrhea, increased lacrimation): tearing from the right eye  Head Trauma:   Association with Menses: none  ED Visits: none  Hospitalizations: none  Missed Work Days (talks at [facility] medicare supplement plans): yes  Sleep (hours/night): wakes up a lot due to bladder/hunger, 6-7  Caffeine Intake: 1 cup of coffee and at most 2 cans of Diet Coke  Hydration: well-hydrated  Nutrition: regular meals  Exercise:   Analgesic Overuse:     Current Medication Regimen:  - ice packs:   - acetaminophen: works better than the others  - ibuprofen:   - Aleve:     Medications Tried: Response  Preventive:  -     Rescue:  -     Medications Not Tried:  - topiramate: would like to avoid this given history of glaucoma  - propranolol: contraindicated due to history of asthma  - tricyclic antidepressants: contraindicated due to med-med interaction with " bupropion    MEDICAL AND SURGICAL HISTORY:  Past Medical History:   Diagnosis Date    Anxiety     Asthma     Diabetes (HCC)     pre diabetic    Dyslipidemia     Hypertension      No past surgical history on file.  MEDICATIONS:  Current Outpatient Medications   Medication Sig    Multiple Vitamin (MULTIVITAMINS PO) Take  by mouth.    Probiotic Product (PROBIOTIC DAILY PO) Take  by mouth.    fluticasone (FLONASE) 50 MCG/ACT nasal spray Administer 1 Spray into affected nostril(S) every day.    ALPRAZolam (XANAX) 0.5 MG Tab at bedtime as needed.    Continuous Glucose Sensor (FREESTYLE BIB 3 SENSOR) [facility] Use for continuous blood glucose monitoring, change sensor every 14 days    metFORMIN ER (GLUCOPHAGE XR) 750 MG TABLET SR 24 HR TAKE 1 TABLET DAILY    TRULICITY 1.5 MG/0.5ML Solution Pen-injector INJECT 0.5ML (=1.5 MG)     SUBCUTANEOUSLY EVERY 7 DAYS    FARXIGA 10 MG Tab TAKE 1 TABLET DAILY    atorvastatin (LIPITOR) 40 MG Tab Take 1 Tablet by mouth at bedtime.    fenofibrate (TRICOR) 145 MG Tab Take 1 Tablet by mouth every day.    Icosapent Ethyl 1 g Cap TAKE 2 CAPSULES TWICE DAILY    losartan-hydrochlorothiazide (HYZAAR) 100-25 MG per tablet Take 1 Tablet by mouth every day.    albuterol 108 (90 Base) MCG/ACT Aero Soln inhalation aerosol Inhale 2 Puffs every 6 hours as needed for Shortness of Breath.    brimonidine (ALPHAGAN) 0.2 % Solution     esomeprazole (NEXIUM) 40 MG delayed-release capsule     ibuprofen (MOTRIN) 200 MG Tab     timolol (TIMOPTIC) 0.25 % Solution     Continuous Blood Gluc Sensor (FREESTYLE BIB 14 DAY SENSOR) [facility] 1 Each every day. Use to test blood glucose daily as directed    buPROPion (WELLBUTRIN XL) 150 MG XL tablet Take 150 mg by mouth every day.     SOCIAL HISTORY:  Social History     Tobacco Use    Smoking status: Some Days     Types: Cigarettes    Smokeless tobacco: Never   Substance Use Topics    Alcohol use: Yes     Alcohol/week: 6.0 oz     Types: 10 Standard drinks or equivalent per  week     Social History     Social History Narrative    Not on file     FAMILY HISTORY:  No family history on file.    REVIEW OF SYSTEMS:  A ROS was completed.  Pertinent positives and negatives were included in the HPI, above.  All other systems were reviewed and are negative.    PHYSICAL EXAM:  General/Medical:  - NAD    Neuro:  MENTAL STATUS: awake and alert; no deficits of speech or language; oriented to conversation; affect was appropriate to situation; pleasant, cooperative    CRANIAL NERVES:    II: acuity: NT, fields: NT, pupils: NT, discs: NT    III/IV/VI: versions: grossly intact    V: facial sensation: NT    VII: facial expression: symmetric    VIII: hearing: intact to voice    IX/X: palate: NT    XI: shoulder shrug: NT    XII: tongue: NT    MOTOR:  - bulk: NT  - tone: NT  Upper Extremity Strength (R/L)    NT   Elbow flexion NT   Elbow extension NT   Shoulder abduction NT     Lower Extremity Strength  (R/L)   Hip flexion NT   Knee extension NT   Knee flexion NT   Ankle dorsiflexion NT   Ankle plantarflexion NT     - pronator drift: NT  - abnormal movements: none    SENSATION:  - light touch: NT  - vibration (R/L, seconds): NT at the great toes  - pinprick: NT  - proprioception: NT  - Romberg: NT    COORDINATION:  - finger to nose: NT  - finger tapping: NT    REFLEXES:  Reflex Right Left   BR NT NT   Biceps NT NT   Triceps NT NT   Patellae NT NT   Achilles NT NT   Toes NT NT     GAIT:  - NT    REVIEW OF IMAGING STUDIES:  No brain imaging available.    REVIEW OF LABORATORY STUDIES:  8/8/2024:  - CMP: notable for HbA1c: 6.6    ASSESSMENT:  Gladys Lombardi is a 43 y.o. woman with chronic migraine w/o aura.  A number of first-line agents are contraindicated due to medical co-morbidities.  Plan to start Botox.    PLAN:  Chronic Migraine w/o Aura:  Prevention:  - start Botox: plan to inject 155 units according to the dosing/injection paradigm currently mandated by the FDA for chronic migraine as  follows:  - 10 units of BOTOX divided into 2 sites into the   - 5 units into the Procerus  - 20 units of Botox divided into 4 units into the Frontalis  - 40 units of Botox divided into 8 sites (4 sites to the right Temporalis and 4 sites to the left Temporalis)  - 30 units divided into 6 units (3 units to the right Occipitalis and 3 units to left Occipitalis)  - 20 units divided into 4 units (2 units to the right Cervical paraspinals, 2 units to the left Cervical paraspinals)  - 30 units of Botox divided into 6 units (3 units to right trapezius, 3 units to the left trapezius).    - try supplementing:  - magnesium: 400-600 mg once or 200-300 mg twice daily  - riboflavin (vitamin B2): 400 mg once daily  - coenzyme Q10: 300 mg daily  - get 7-9 hours of sleep per night; can try supplementing melatonin 2-10 mg, 2-3 hours before bedtime  - drink plenty of fluids (urine should be nearly clear)  - avoid excessive caffeine intake (no more than 2 servings per day and nothing in the afternoon)  - eat regular meals (don't skip meals)  - get moderate exercise (even just a 20 minute walk daily)    Rescue:  - do not use analgesics (e.g., ibuprofen, acetaminophen) more than 2 days per week in order to avoid analgesic rebound headaches    - keep a headache log    Follow-Up:  - Return in about 5 months (around 2/6/2025).    Signed: Luis Feliz M.D.

## 2024-09-10 DIAGNOSIS — E11.9 TYPE 2 DIABETES MELLITUS WITHOUT COMPLICATION, WITHOUT LONG-TERM CURRENT USE OF INSULIN (HCC): ICD-10-CM

## 2024-09-11 RX ORDER — DAPAGLIFLOZIN 10 MG/1
10 TABLET, FILM COATED ORAL DAILY
Qty: 100 TABLET | Refills: 3 | Status: SHIPPED | OUTPATIENT
Start: 2024-09-11

## 2024-11-25 ENCOUNTER — APPOINTMENT (OUTPATIENT)
Dept: VASCULAR LAB | Facility: MEDICAL CENTER | Age: 44
End: 2024-11-25
Payer: COMMERCIAL

## 2024-12-18 ENCOUNTER — TELEPHONE (OUTPATIENT)
Dept: NEUROLOGY | Facility: MEDICAL CENTER | Age: 44
End: 2024-12-18
Payer: COMMERCIAL

## 2024-12-18 NOTE — TELEPHONE ENCOUNTER
NEUROLOGY PATIENT PRE-VISIT PLANNING     Patient was NOT contacted to complete PVP.  Note: Patient will not be contacted if there is no indication to call.     Patient Appointment is scheduled as: Established Patient     Is visit type and length scheduled correctly? Yes    [employer] Patient is checked in Patient Demographics? Yes    3.   Is referral attached to visit? Yes    4. Were records received from referring provider? Yes    4. Patient was NOT contacted to have someone accompany them to visit.     5. Is this appointment scheduled as a Hospital Follow-Up?  No    6. Does the patient require any pre procedure or post procedure follow up? No    7. If any orders were placed at last visit or intended to be done for this visit do we have Results/Consult Notes? No  Labs - Labs were not ordered at last office visit.  Imaging - Imaging was not ordered at last office visit.  Referrals - No referrals were ordered at last office visit.  Note: If patient appointment is for lab or imaging review and patient did not complete the studies, check with provider if OK to reschedule patient until completed.    8. If patient appointment is for Botox - is order pended for provider? No (with explanation) new work flow.    9. Was Plan Assessment from last Neurology Office Visit Reviewed?  Yes

## 2024-12-19 ENCOUNTER — OFFICE VISIT (OUTPATIENT)
Dept: NEUROLOGY | Facility: MEDICAL CENTER | Age: 44
End: 2024-12-19
Attending: PSYCHIATRY & NEUROLOGY
Payer: COMMERCIAL

## 2024-12-19 VITALS
WEIGHT: 200.84 LBS | DIASTOLIC BLOOD PRESSURE: 70 MMHG | RESPIRATION RATE: 20 BRPM | BODY MASS INDEX: 31.52 KG/M2 | OXYGEN SATURATION: 97 % | HEART RATE: 80 BPM | TEMPERATURE: 97.6 F | SYSTOLIC BLOOD PRESSURE: 108 MMHG | HEIGHT: 67 IN

## 2024-12-19 DIAGNOSIS — G43.709 CHRONIC MIGRAINE WITHOUT AURA WITHOUT STATUS MIGRAINOSUS, NOT INTRACTABLE: Primary | ICD-10-CM

## 2024-12-19 PROCEDURE — 3074F SYST BP LT 130 MM HG: CPT | Performed by: PSYCHIATRY & NEUROLOGY

## 2024-12-19 PROCEDURE — 3078F DIAST BP <80 MM HG: CPT | Performed by: PSYCHIATRY & NEUROLOGY

## 2024-12-19 PROCEDURE — 700101 HCHG RX REV CODE 250

## 2024-12-19 PROCEDURE — 64615 CHEMODENERV MUSC MIGRAINE: CPT | Performed by: PSYCHIATRY & NEUROLOGY

## 2024-12-19 PROCEDURE — 700111 HCHG RX REV CODE 636 W/ 250 OVERRIDE (IP)

## 2024-12-19 RX ADMIN — SODIUM CHLORIDE 155 UNITS: 9 INJECTION, SOLUTION INTRAMUSCULAR; INTRAVENOUS; SUBCUTANEOUS at 14:05

## 2024-12-19 ASSESSMENT — PATIENT HEALTH QUESTIONNAIRE - PHQ9
5. POOR APPETITE OR OVEREATING: 2 - MORE THAN HALF THE DAYS
CLINICAL INTERPRETATION OF PHQ2 SCORE: 3
SUM OF ALL RESPONSES TO PHQ QUESTIONS 1-9: 9

## 2024-12-19 NOTE — PROGRESS NOTES
[facility]  BOTOX PROCEDURE NOTE    Chronic Migraine:  Botox therapy has reduced patient’s migraines by more than 7 days and/or 100 hours per month.     I treated Gladys Lombardi in clinic today with BotoxA 155 units according to the dosing/injection paradigm currently mandated by the FDA for the management of chronic migraine.  Specifically, I injected:  - 5 units to the procerus,  - 5 units to the corrugators (bilaterally),  - a total of 20 units to the frontalis musculature,  - 20 units to the temporalis (bilaterally),  - 15 units to the occipitalis (bilaterally),  - 10 units to the cervical paraspinals (bilaterally), and  - 15 units to the trapezius musculature (bilaterally).    The remainder of the Botox was discarded as wastage per FDA guidelines  Consent on file.  Patient identify verified with 2 patient identifiers.     Frequency of headaches is >15 days monthly with at least 8 migraines monthly.    Migraines include at least two of the following: worsened with activity or avoidance of activity with migraines (ie they go lie down), moderate to severe pain intensity, pulsing headache, unilateral headache and has  have either nausea or vomiting OR sensitivity to light and sound.     Although Gladys Lombardi is responding to botox s/he is NOT migraine free.  I recommend that Botox be continued at this time.    Gladys Lombardi has chronic migraines, defined as having 15 or more headaches days per month, 8 of which are migraines, over a minimum of the last three months.  Episodes last more than 4 hours (untreated).  Pt has 2 or more of following (see initial note):  - headache worsened with activity  - pain is moderate to severe intensity  - pulsing in nature  - unilateral   and patient either has nausea/vomiting OR sensitivity to light and sound.    No adverse effect of Botox noted at conclusion of today's appointment.    Follow up in 12 weeks for Botox or sooner if needed.    Signed: Luis  EMANUEL Feliz M.D.

## 2025-02-24 ENCOUNTER — OFFICE VISIT (OUTPATIENT)
Dept: VASCULAR LAB | Facility: MEDICAL CENTER | Age: 45
End: 2025-02-24
Attending: NURSE PRACTITIONER
Payer: COMMERCIAL

## 2025-02-24 ENCOUNTER — OFFICE VISIT (OUTPATIENT)
Dept: NEUROLOGY | Facility: MEDICAL CENTER | Age: 45
End: 2025-02-24
Attending: PSYCHIATRY & NEUROLOGY
Payer: COMMERCIAL

## 2025-02-24 VITALS
DIASTOLIC BLOOD PRESSURE: 62 MMHG | WEIGHT: 202.38 LBS | OXYGEN SATURATION: 95 % | SYSTOLIC BLOOD PRESSURE: 112 MMHG | BODY MASS INDEX: 31.76 KG/M2 | HEIGHT: 67 IN | TEMPERATURE: 96.8 F | HEART RATE: 72 BPM

## 2025-02-24 VITALS
RESPIRATION RATE: 16 BRPM | HEIGHT: 67 IN | HEART RATE: 81 BPM | DIASTOLIC BLOOD PRESSURE: 80 MMHG | WEIGHT: 200 LBS | SYSTOLIC BLOOD PRESSURE: 123 MMHG | BODY MASS INDEX: 31.39 KG/M2

## 2025-02-24 DIAGNOSIS — E11.9 TYPE 2 DIABETES MELLITUS WITHOUT COMPLICATION, WITHOUT LONG-TERM CURRENT USE OF INSULIN (HCC): ICD-10-CM

## 2025-02-24 DIAGNOSIS — E78.1 HYPERTRIGLYCERIDEMIA: ICD-10-CM

## 2025-02-24 DIAGNOSIS — G43.709 CHRONIC MIGRAINE WITHOUT AURA WITHOUT STATUS MIGRAINOSUS, NOT INTRACTABLE: Primary | ICD-10-CM

## 2025-02-24 DIAGNOSIS — F17.200 TOBACCO USE DISORDER: ICD-10-CM

## 2025-02-24 DIAGNOSIS — E78.5 DYSLIPIDEMIA: ICD-10-CM

## 2025-02-24 DIAGNOSIS — I10 ESSENTIAL HYPERTENSION: ICD-10-CM

## 2025-02-24 PROCEDURE — 99212 OFFICE O/P EST SF 10 MIN: CPT

## 2025-02-24 PROCEDURE — 3074F SYST BP LT 130 MM HG: CPT | Performed by: PSYCHIATRY & NEUROLOGY

## 2025-02-24 PROCEDURE — 3078F DIAST BP <80 MM HG: CPT | Performed by: PSYCHIATRY & NEUROLOGY

## 2025-02-24 PROCEDURE — 99212 OFFICE O/P EST SF 10 MIN: CPT | Performed by: PSYCHIATRY & NEUROLOGY

## 2025-02-24 PROCEDURE — 99213 OFFICE O/P EST LOW 20 MIN: CPT | Performed by: PSYCHIATRY & NEUROLOGY

## 2025-02-24 PROCEDURE — 3074F SYST BP LT 130 MM HG: CPT | Performed by: NURSE PRACTITIONER

## 2025-02-24 PROCEDURE — 99214 OFFICE O/P EST MOD 30 MIN: CPT | Performed by: NURSE PRACTITIONER

## 2025-02-24 PROCEDURE — 3079F DIAST BP 80-89 MM HG: CPT | Performed by: NURSE PRACTITIONER

## 2025-02-24 RX ORDER — SUMATRIPTAN SUCCINATE 100 MG/1
TABLET ORAL
Qty: 12 TABLET | Refills: 11 | Status: SHIPPED | OUTPATIENT
Start: 2025-02-24

## 2025-02-24 RX ORDER — VARENICLINE TARTRATE 0.5 (11)-1
KIT ORAL
Qty: 1 EACH | Refills: 0 | Status: SHIPPED | OUTPATIENT
Start: 2025-02-24

## 2025-02-24 RX ORDER — SUMATRIPTAN SUCCINATE 100 MG/1
TABLET ORAL
Qty: 12 TABLET | Refills: 11 | Status: SHIPPED | OUTPATIENT
Start: 2025-02-24 | End: 2025-02-24

## 2025-02-24 RX ORDER — VARENICLINE TARTRATE 1 MG/1
1 TABLET, FILM COATED ORAL 2 TIMES DAILY
Qty: 60 TABLET | Refills: 5 | Status: SHIPPED | OUTPATIENT
Start: 2025-02-24

## 2025-02-24 ASSESSMENT — PATIENT HEALTH QUESTIONNAIRE - PHQ9
CLINICAL INTERPRETATION OF PHQ2 SCORE: 2
SUM OF ALL RESPONSES TO PHQ QUESTIONS 1-9: 8
5. POOR APPETITE OR OVEREATING: 1 - SEVERAL DAYS

## 2025-02-24 NOTE — PROGRESS NOTES
"[facility] - Follow-Up   Date of Service: 02/24/2025    Here for vascular medicine follow-up    Subjective    HPI:   Here for follow-up of hypertension, dyslipidemia, and diabetes  Reports she has \"fallen off the wagon\" due to stress  Not eating a very healthy diet  Stopped Trulicity for 3-4 months due to high copay  Feels better off of it as previously nauseated  Continues metformin despite diarrhea  Weight remains same  She is ready to quit smoking and wants to try Chantix  Denies CP, SOB, palpitations     SOCIAL HISTORY   Social History     Tobacco Use   Smoking Status Every Day    Current packs/day: 0.50    Types: Cigarettes   Smokeless Tobacco Never   Smoking: ~8 cig/day; says she's decreasing over time.    Change in weight: down 15 lbs from Jan 2024  Exercise -relatively active  Diet -decreasing saturated fats, cheese, mexican foods        Objective      Vitals:    02/24/25 1541   BP: 123/80   Pulse: 81   Resp: 16   Weight: 90.7 kg (200 lb)   Height: 1.702 m (5' 7\")     Physical Exam  Vitals reviewed.   Constitutional:       Appearance: Normal appearance. She is well-developed.   HENT:      Head: Normocephalic and atraumatic.   Neck:      Vascular: No carotid bruit.   Cardiovascular:      Rate and Rhythm: Regular rhythm. Tachycardia present.      Heart sounds: Normal heart sounds. No murmur heard.  Pulmonary:      Effort: Pulmonary effort is normal. No respiratory distress.      Breath sounds: No wheezing or rales.   Abdominal:      Tenderness: There is no abdominal tenderness.   Musculoskeletal:      Right lower leg: No edema.      Left lower leg: No edema.   Skin:     General: Skin is warm and dry.      Coloration: Skin is not pale.   Neurological:      General: No focal deficit present.      Mental Status: She is alert and oriented to person, place, and time. Mental status is at baseline.      Cranial Nerves: No cranial nerve deficit.   Psychiatric:         Mood and Affect: Mood normal.         " Behavior: Behavior normal.       DATA REVIEW:    Blood work June 2023  Glucose 173  BUN 17, creatinine 0.77, GFR 99  Total cholesterol 177, triglycerides 567, LDL 61  A1c is 8.3    VASCULAR IMAGING:     Stress echo 8/27/19  Good functional capacity.  negative stress echo.        1. Dyslipidemia        2. Type 2 diabetes mellitus without complication, without long-term current use of insulin (HCC)        3. Essential hypertension        4. Tobacco use disorder  varenicline (CHANTIX CONTINUING MONTH IFRAH) 1 MG tablet    Varenicline Tartrate, Starter, (CHANTIX STARTING MONTH IFRAH) 0.5 MG X 11 & 1 MG X 42 Tablet Therapy Pack      5. Hypertriglyceridemia          ASSESSMENT AND PLAN  Patient Type, check all that apply:   Primary Prevention, Type 2 Diabetes Mellitus and Metabolic Syndrome     Lipid management  ACC AHA goal LDL less than 100  Mostly characterized by hypertriglyceridemia  No known history of pancreatitis  LDL at goal  Triglycerides and non-HDL much improved with dietary changes but not at target 1/2024  Benefits of multiple lipid-lowering medications outweighs risk  Plan:  -Continue atorvastatin 40mg daily   -Continue fenofibrate  -Continue icosapent ethyl  -Continue vit d 2000u/day  -Diet and exercise as per below  -Recheck lipid panel, lipoprotein a, Apo B    Blood pressure management  ACC AHA goal less than 130/80  At goal at least in the office  GFR and electrolytes stable  Plan:  -Continue losartan HCT  -Encouraged home blood pressure monitoring-- check 1-2 times per week especially due to weight loss, may need to adjust medications if she continues to lose weight  -Recheck GFR, electrolytes, urine for albumin prior to next visit    Diabetes/impaired fasting glucose - Type 2 DM  Goal A1c < 7  Lab Results   Component Value Date    HBA1C 7.9 (H) 02/13/2025      Lab Results   Component Value Date/Time    MICRALB 5.5 08/08/2024 09:26 AM   A1C much worse than previous  As discussed today, she will likely need  a GLP-1 to manage her diabetes.  She is reluctant to try another option at this time as she wants to try to intensify her diet and exercise.  She doesn't think she would qualify for PAP.  She will remain on metformin and farxiga.  Through SD[facility] she will attempt to significantly change her current dietary and exercise habits to reduce her A1C .   Has not followed up with DM clinic in 6 months   Plan:  -Follow up with DM clinic as scheduled today, if A1C not improving by next time she will consider a new medication as recommended by pharmacist     Antiplatelet anticoagulant therapy -not currently indicated    Tobacco use - current everyday smoker; 10 cigs per day   She is under a lot of stress but wants to quit - she is wanting to trial chantix  - Start chanitx starter pack, then transition to BID dosing  - Pick a quite date and start Chantix 2 weeks prior    - Continue to evaluate at every visit    Exercise therapy -continue much improved exercise habits.  Try to do something every day    Weight management and eating plan - continue more healthy eating plan as discussed in DM clinic visit.  Continue to decrease simple carbohydrates.  Eat more high protein and high complex carbohydrates.  Hopefully as we escalate the dose of her SGLT2 we will begin to see considerable weight loss.  Continue to limit alcohol.     Studies Ordered at Todays Visit: none   Blood Work Ordered At Today’s visit: none currently   Follow-Up: 3 months with DM clinic, then 3-4 months with [facility] med       BLAIR Meadows    Cc: JAMES Pollock

## 2025-02-25 NOTE — PROGRESS NOTES
[facility]  GENERAL NEUROLOGY  FOLLOW-UP VISIT    CC: chronic migraine w/o aura    INTERVAL HISTORY:  Gladys Lombardi is a 44 y.o. woman with chronic migraine w/o aura and a history otherwise notable for asthma (uses an inhaler as needed), metabolic syndrome, HTN, HLD, T2DM, glaucoma, tobacco use, and depression.  I last saw her in the clinic on 12/19/2024 at the time of Botox administration.  At that time I recommended she continue Botox.  Today, she was uanccomapnied, and she provided the following interval history:    The following is a summary of headache symptoms, presented in my standard format:     Family History: brother w/ migraine  Age at onset (years): childhood  Location: top of head, bilateral, right eye, right eyebrow  Radiation: eye/eyebrow pain radiates upward  Frequency: baseline: daily, lately: hardly anything for 1 month after first Botox injection series, daily lately  Duration: baseline: ~2 hours, lately:   Headache Days/Month: baseline: 30/03, lately:   Quality: ache  Intensity: baseline: 3-8/10, lately:   Aura: none  Photophobia/Phonophobia/Nausea/Vomiting: yes/yes/yes/no  Provoked by Physical Activity?:   Triggers: work (stares at 3 screens, lowered the brightness), hunger  Associated Symptoms:   Autonomic Signs (such as ptosis, miosis, conjunctival injection, rhinorrhea, increased lacrimation): tearing from the right eye  Head Trauma:   Association with Menses: none  ED Visits: none  Hospitalizations: none  Missed Work Days (talks at [facility] medicare supplement plans): yes  Sleep (hours/night): wakes up a lot due to bladder/hunger, 6-7  Caffeine Intake: 1 cup of coffee and at most 2 cans of Diet Coke  Hydration: well-hydrated  Nutrition: regular meals  Exercise:   Analgesic Overuse:      Current Medication Regimen:  - Botox: promising after first round of injections  - ice packs:   - acetaminophen: works better than the others  - ibuprofen:   - Aleve:      Medications Tried:  Response  Preventive:  -      Rescue:  -      Medications Not Tried:  - topiramate: would like to avoid this given history of glaucoma  - propranolol: contraindicated due to history of asthma  - tricyclic antidepressants: contraindicated due to med-med interaction with bupropion    MEDICATIONS:  Current Outpatient Medications   Medication Sig    varenicline (CHANTIX CONTINUING MONTH PAK) 1 MG tablet Take 1 Tablet by mouth 2 times a day. For smoking cessation    Varenicline Tartrate, Starter, (CHANTIX STARTING MONTH IFRAH) 0.5 MG X 11 & 1 MG X 42 Tablet Therapy Pack Take as directed, for quitting smoking    sumatriptan (IMITREX) 100 MG tablet Take 100 mg at the onset of aura/HA; may re-dose x1 after 2 hrs if HA persists; MDD: 200 mg; do not use >2 days/week.    dapagliflozin propanediol (FARXIGA) 10 MG Tab TAKE 1 TABLET DAILY    Multiple Vitamin (MULTIVITAMINS PO) Take  by mouth.    Probiotic Product (PROBIOTIC DAILY PO) Take  by mouth.    fluticasone (FLONASE) 50 MCG/ACT nasal spray Administer 1 Spray into affected nostril(S) every day.    ALPRAZolam (XANAX) 0.5 MG Tab at bedtime as needed.    Continuous Glucose Sensor (FREESTYLE BIB 3 SENSOR) [facility] [place] for continuous blood glucose monitoring, change sensor every 14 days    metFORMIN ER (GLUCOPHAGE XR) 750 MG TABLET SR 24 HR TAKE 1 TABLET DAILY    atorvastatin (LIPITOR) 40 MG Tab Take 1 Tablet by mouth at bedtime.    fenofibrate (TRICOR) 145 MG Tab Take 1 Tablet by mouth every day.    Icosapent Ethyl 1 g Cap TAKE 2 CAPSULES TWICE DAILY    losartan-hydrochlorothiazide (HYZAAR) 100-25 MG per tablet Take 1 Tablet by mouth every day.    albuterol 108 (90 Base) MCG/ACT Aero Soln inhalation aerosol Inhale 2 Puffs every 6 hours as needed for Shortness of Breath.    brimonidine (ALPHAGAN) 0.2 % Solution     esomeprazole (NEXIUM) 40 MG delayed-release capsule     ibuprofen (MOTRIN) 200 MG Tab     timolol (TIMOPTIC) 0.25 % Solution     buPROPion (WELLBUTRIN XL) 150 MG XL  tablet Take 150 mg by mouth every day.    TRULICITY 1.5 MG/0.5ML Solution Pen-injector INJECT 0.5ML (=1.5 MG)     SUBCUTANEOUSLY EVERY 7 DAYS (Patient not taking: Reported on 2/24/2025)     MEDICAL, SOCIAL, AND FAMILY HISTORY:  There is no change in the patient's ROS or medical, social, or family histories since the previous visit on 12/19/2024.    REVIEW OF SYSTEMS:  A ROS was completed.  Pertinent positives and negatives were included in the HPI, above.  All other systems were reviewed and are negative.    PHYSICAL EXAM:  General/Medical:  - NAD    Neuro:  MENTAL STATUS: awake and alert; no deficits of speech or language; oriented to conversation; affect was appropriate to situation; pleasant, cooperative    CRANIAL NERVES:    II: acuity: NT, fields: NT, pupils: NT, discs: NT    III/IV/VI: versions: grossly intact    V: facial sensation: NT    VII: facial expression: symmetric    VIII: hearing: intact to voice    IX/X: palate: NT    XI: shoulder shrug: NT    XII: tongue: NT    MOTOR:  - bulk: NT  - tone: NT  Upper Extremity Strength (R/L)    NT   Elbow flexion NT   Elbow extension NT   Shoulder abduction NT     Lower Extremity Strength  (R/L)   Hip flexion NT   Knee extension NT   Knee flexion NT   Ankle dorsiflexion NT   Ankle plantarflexion NT     - heel-walk: NT  - toe-walk: NT  - pronator drift: absent  - abnormal movements: none    SENSATION:  - light touch: NT  - vibration (R/L, seconds): NT at the great toes  - pinprick: NT  - proprioception: NT  - Romberg: NT    COORDINATION:  - finger to nose: NT  - finger tapping: NT    REFLEXES:  Reflex Right Left   BR NT NT   Biceps NT NT   Triceps NT NT   Patellae NT NT   Achilles NT NT   Toes NT NT     GAIT:  - NT    REVIEW OF IMAGING STUDIES:  No additional data since the last visit.    REVIEW OF LABORATORY STUDIES:  No additional data since the last visit.    ASSESSMENT:  lGadys Lombardi is a 44 y.o. woman with chronic migraine w/o aura and a history  otherwise notable for asthma (uses an inhaler as needed), metabolic syndrome, HTN, HLD, T2DM, glaucoma, tobacco use, and depression.  Plans/recommendations as follows:    PLAN:  Chronic Migraine w/o Aura:  Prevention:  - continue Botox  - get 7-9 hours of sleep per night; can try supplementing melatonin 2-10 mg, 2-3 hours before bedtime  - drink plenty of fluids (urine should be nearly clear)  - avoid excessive caffeine intake (no more than 2 servings per day and nothing in the afternoon)  - eat regular meals (don't skip meals)  - get moderate exercise (even just a 20 minute walk daily)    Rescue:  - sumatriptan 100 mg: take this at the onset of aura or headache pain; may re-dose x1 after 2 hours if headache persists; do not use more than 2 days/week  - do not use analgesics (e.g., ibuprofen, acetaminophen) more than 2 days per week in order to avoid analgesic rebound headaches    - keep a headache log    Follow-Up:  - Return in about 3 months (around 5/24/2025).    Signed: Luis Feliz M.D.

## 2025-03-20 DIAGNOSIS — E11.9 TYPE 2 DIABETES MELLITUS WITHOUT COMPLICATION, WITHOUT LONG-TERM CURRENT USE OF INSULIN (HCC): ICD-10-CM

## 2025-03-20 DIAGNOSIS — E78.5 DYSLIPIDEMIA: ICD-10-CM

## 2025-03-20 DIAGNOSIS — E78.1 HYPERTRIGLYCERIDEMIA: ICD-10-CM

## 2025-03-20 DIAGNOSIS — E88.810 METABOLIC SYNDROME: ICD-10-CM

## 2025-03-20 DIAGNOSIS — I10 ESSENTIAL HYPERTENSION: ICD-10-CM

## 2025-03-20 RX ORDER — METFORMIN HYDROCHLORIDE 750 MG/1
750 TABLET, EXTENDED RELEASE ORAL DAILY
Qty: 90 TABLET | Refills: 1 | Status: SHIPPED | OUTPATIENT
Start: 2025-03-20

## 2025-03-20 RX ORDER — FENOFIBRATE 145 MG/1
145 TABLET, COATED ORAL DAILY
Qty: 90 TABLET | Refills: 3 | Status: SHIPPED | OUTPATIENT
Start: 2025-03-20

## 2025-03-20 RX ORDER — LOSARTAN POTASSIUM AND HYDROCHLOROTHIAZIDE 25; 100 MG/1; MG/1
1 TABLET ORAL DAILY
Qty: 90 TABLET | Refills: 3 | Status: SHIPPED | OUTPATIENT
Start: 2025-03-20

## 2025-03-20 RX ORDER — ATORVASTATIN CALCIUM 40 MG/1
40 TABLET, FILM COATED ORAL
Qty: 90 TABLET | Refills: 3 | Status: SHIPPED | OUTPATIENT
Start: 2025-03-20

## 2025-03-20 RX ORDER — ICOSAPENT ETHYL 1 G/1
CAPSULE ORAL
Qty: 360 CAPSULE | Refills: 3 | Status: SHIPPED | OUTPATIENT
Start: 2025-03-20

## 2025-03-24 ENCOUNTER — OFFICE VISIT (OUTPATIENT)
Dept: NEUROLOGY | Facility: MEDICAL CENTER | Age: 45
End: 2025-03-24
Attending: PSYCHIATRY & NEUROLOGY
Payer: COMMERCIAL

## 2025-03-24 VITALS
HEART RATE: 82 BPM | SYSTOLIC BLOOD PRESSURE: 114 MMHG | TEMPERATURE: 97.3 F | RESPIRATION RATE: 14 BRPM | BODY MASS INDEX: 31.38 KG/M2 | DIASTOLIC BLOOD PRESSURE: 78 MMHG | OXYGEN SATURATION: 99 % | WEIGHT: 199.96 LBS | HEIGHT: 67 IN

## 2025-03-24 DIAGNOSIS — G43.709 CHRONIC MIGRAINE WITHOUT AURA WITHOUT STATUS MIGRAINOSUS, NOT INTRACTABLE: Primary | ICD-10-CM

## 2025-03-24 PROCEDURE — 700111 HCHG RX REV CODE 636 W/ 250 OVERRIDE (IP)

## 2025-03-24 PROCEDURE — 64615 CHEMODENERV MUSC MIGRAINE: CPT | Performed by: PSYCHIATRY & NEUROLOGY

## 2025-03-24 PROCEDURE — 3078F DIAST BP <80 MM HG: CPT | Performed by: PSYCHIATRY & NEUROLOGY

## 2025-03-24 PROCEDURE — 3074F SYST BP LT 130 MM HG: CPT | Performed by: PSYCHIATRY & NEUROLOGY

## 2025-03-24 RX ADMIN — ONABOTULINUMTOXINA 155 UNITS: 200 INJECTION, POWDER, LYOPHILIZED, FOR SOLUTION INTRADERMAL; INTRAMUSCULAR at 13:40

## 2025-03-24 ASSESSMENT — PATIENT HEALTH QUESTIONNAIRE - PHQ9
5. POOR APPETITE OR OVEREATING: 0 - NOT AT ALL
SUM OF ALL RESPONSES TO PHQ QUESTIONS 1-9: 10
CLINICAL INTERPRETATION OF PHQ2 SCORE: 3

## 2025-03-24 NOTE — PROGRESS NOTES
[facility]  BOTOX PROCEDURE NOTE    Chronic Migraine:  Botox therapy has reduced patient’s migraines by more than 7 days and/or 100 hours per month.     I treated Gladys Lomabrdi in clinic today with BotoxA 155 units according to the dosing/injection paradigm currently mandated by the FDA for the management of chronic migraine.  Specifically, I injected:  - 5 units to the procerus,  - 5 units to the corrugators (bilaterally),  - a total of 20 units to the frontalis musculature,  - 20 units to the temporalis (bilaterally),  - 15 units to the occipitalis (bilaterally),  - 10 units to the cervical paraspinals (bilaterally), and  - 15 units to the trapezius musculature (bilaterally).    The remainder of the Botox was discarded as wastage per FDA guidelines  Consent on file.  Patient identify verified with 2 patient identifiers.     Frequency of headaches is >15 days monthly with at least 8 migraines monthly.    Migraines include at least two of the following: worsened with activity or avoidance of activity with migraines (ie they go lie down), moderate to severe pain intensity, pulsing headache, unilateral headache and has  have either nausea or vomiting OR sensitivity to light and sound.     Although Gladys Lombardi is responding to botox s/he is NOT migraine free.  I recommend that Botox be continued at this time.    Gladys Lombardi has chronic migraines, defined as having 15 or more headaches days per month, 8 of which are migraines, over a minimum of the last three months.  Episodes last more than 4 hours (untreated).  Pt has 2 or more of following (see initial note):  - headache worsened with activity  - pain is moderate to severe intensity  - pulsing in nature  - unilateral   and patient either has nausea/vomiting OR sensitivity to light and sound.    No adverse effect of Botox noted at conclusion of today's appointment.    Follow up in 12 weeks for Botox or sooner if needed.    Signed: Luis  EMANUEL Feliz M.D.

## 2025-05-20 ENCOUNTER — APPOINTMENT (OUTPATIENT)
Dept: NEUROLOGY | Facility: MEDICAL CENTER | Age: 45
End: 2025-05-20
Attending: PSYCHIATRY & NEUROLOGY
Payer: COMMERCIAL

## 2025-05-30 ENCOUNTER — APPOINTMENT (OUTPATIENT)
Dept: VASCULAR LAB | Facility: MEDICAL CENTER | Age: 45
End: 2025-05-30
Attending: INTERNAL MEDICINE
Payer: COMMERCIAL

## 2025-06-17 ENCOUNTER — OFFICE VISIT (OUTPATIENT)
Dept: NEUROLOGY | Facility: MEDICAL CENTER | Age: 45
End: 2025-06-17
Attending: PSYCHIATRY & NEUROLOGY
Payer: COMMERCIAL

## 2025-06-17 VITALS
HEIGHT: 67 IN | RESPIRATION RATE: 14 BRPM | HEART RATE: 80 BPM | DIASTOLIC BLOOD PRESSURE: 74 MMHG | BODY MASS INDEX: 31.56 KG/M2 | TEMPERATURE: 96.7 F | WEIGHT: 201.06 LBS | OXYGEN SATURATION: 96 % | SYSTOLIC BLOOD PRESSURE: 126 MMHG

## 2025-06-17 DIAGNOSIS — G43.709 CHRONIC MIGRAINE WITHOUT AURA WITHOUT STATUS MIGRAINOSUS, NOT INTRACTABLE: Primary | ICD-10-CM

## 2025-06-17 PROCEDURE — 3074F SYST BP LT 130 MM HG: CPT | Performed by: PSYCHIATRY & NEUROLOGY

## 2025-06-17 PROCEDURE — 64615 CHEMODENERV MUSC MIGRAINE: CPT | Performed by: PSYCHIATRY & NEUROLOGY

## 2025-06-17 PROCEDURE — 700111 HCHG RX REV CODE 636 W/ 250 OVERRIDE (IP)

## 2025-06-17 PROCEDURE — 3078F DIAST BP <80 MM HG: CPT | Performed by: PSYCHIATRY & NEUROLOGY

## 2025-06-17 RX ORDER — DULOXETIN HYDROCHLORIDE 30 MG/1
30 CAPSULE, DELAYED RELEASE ORAL DAILY
COMMUNITY
Start: 2025-05-27

## 2025-06-17 RX ORDER — MELOXICAM 15 MG/1
15 TABLET ORAL
COMMUNITY
Start: 2025-06-04

## 2025-06-17 RX ORDER — PROPRANOLOL HCL 20 MG
TABLET ORAL
COMMUNITY
Start: 2025-05-27

## 2025-06-17 RX ADMIN — ONABOTULINUMTOXINA 155 UNITS: 200 INJECTION, POWDER, LYOPHILIZED, FOR SOLUTION INTRADERMAL; INTRAMUSCULAR at 16:29

## 2025-06-17 ASSESSMENT — PATIENT HEALTH QUESTIONNAIRE - PHQ9
CLINICAL INTERPRETATION OF PHQ2 SCORE: 4
SUM OF ALL RESPONSES TO PHQ QUESTIONS 1-9: 14
5. POOR APPETITE OR OVEREATING: 2 - MORE THAN HALF THE DAYS

## 2025-06-30 ENCOUNTER — DOCUMENTATION (OUTPATIENT)
Dept: VASCULAR LAB | Facility: MEDICAL CENTER | Age: 45
End: 2025-06-30
Payer: COMMERCIAL

## 2025-06-30 NOTE — PROGRESS NOTES
Established patient  Chart prep for upcoming appointment.    Any pending/incomplete orders from last visit? No, all orders completed.  Was patient called and reminded to complete pending orders? N/A orders complete  Were any records requested?  No    Referral up to date? Yes  Referral attached to appointment (renewals and New patients only)? N/A (established with up-to-date referral)  Virtual appointment? No                  Selam Leblanc, Medical Assistant   [facility]   Ph: [phone]  Fx: [phone]

## 2025-07-04 DIAGNOSIS — E11.9 TYPE 2 DIABETES MELLITUS WITHOUT COMPLICATION, WITHOUT LONG-TERM CURRENT USE OF INSULIN (HCC): ICD-10-CM

## 2025-07-07 RX ORDER — DAPAGLIFLOZIN 10 MG/1
10 TABLET, FILM COATED ORAL DAILY
Qty: 100 TABLET | Refills: 3 | Status: SHIPPED | OUTPATIENT
Start: 2025-07-07

## 2025-07-09 ENCOUNTER — NON-PROVIDER VISIT (OUTPATIENT)
Dept: VASCULAR LAB | Facility: MEDICAL CENTER | Age: 45
End: 2025-07-09
Attending: INTERNAL MEDICINE
Payer: COMMERCIAL

## 2025-07-09 ENCOUNTER — OFFICE VISIT (OUTPATIENT)
Dept: VASCULAR LAB | Facility: MEDICAL CENTER | Age: 45
End: 2025-07-09
Attending: NURSE PRACTITIONER
Payer: COMMERCIAL

## 2025-07-09 VITALS
SYSTOLIC BLOOD PRESSURE: 116 MMHG | BODY MASS INDEX: 31.55 KG/M2 | DIASTOLIC BLOOD PRESSURE: 84 MMHG | HEIGHT: 67 IN | HEART RATE: 80 BPM | WEIGHT: 201 LBS

## 2025-07-09 DIAGNOSIS — E78.5 DYSLIPIDEMIA: ICD-10-CM

## 2025-07-09 DIAGNOSIS — E11.9 TYPE 2 DIABETES MELLITUS WITHOUT COMPLICATION, WITHOUT LONG-TERM CURRENT USE OF INSULIN (HCC): Primary | ICD-10-CM

## 2025-07-09 DIAGNOSIS — E11.9 TYPE 2 DIABETES MELLITUS WITHOUT COMPLICATION, WITHOUT LONG-TERM CURRENT USE OF INSULIN (HCC): ICD-10-CM

## 2025-07-09 DIAGNOSIS — I10 ESSENTIAL HYPERTENSION: Primary | ICD-10-CM

## 2025-07-09 LAB
HBA1C MFR BLD: 7.8 % (ref ?–5.8)
POCT INT CON NEG: NEGATIVE
POCT INT CON POS: POSITIVE

## 2025-07-09 PROCEDURE — 99214 OFFICE O/P EST MOD 30 MIN: CPT | Performed by: NURSE PRACTITIONER

## 2025-07-09 PROCEDURE — 3074F SYST BP LT 130 MM HG: CPT | Performed by: NURSE PRACTITIONER

## 2025-07-09 PROCEDURE — 99212 OFFICE O/P EST SF 10 MIN: CPT

## 2025-07-09 PROCEDURE — 83036 HEMOGLOBIN GLYCOSYLATED A1C: CPT

## 2025-07-09 PROCEDURE — 3079F DIAST BP 80-89 MM HG: CPT | Performed by: NURSE PRACTITIONER

## 2025-07-09 RX ORDER — SEMAGLUTIDE 0.68 MG/ML
0.25 INJECTION, SOLUTION SUBCUTANEOUS
Qty: 3 ML | Refills: 5 | Status: SHIPPED | OUTPATIENT
Start: 2025-07-09

## 2025-07-09 NOTE — PROGRESS NOTES
"[facility] - Follow-Up   Date of Service: 07/09/2025    Here for vascular medicine follow-up    Subjective    HPI:   Here for follow-up of hypertension, dyslipidemia, and diabetes  Very stressed and tearful today  Has severe right sided facial pain, father is in hospital   She is not eating healthy  Stopped Trulicity   Remains on metformin and farxiga  Quit smoking 1 month ago  Denies CP, SOB, palpitations.  Sees DM clinic today     SOCIAL HISTORY   Social History     Tobacco Use   Smoking Status Former    Current packs/day: 0.50    Types: Cigarettes   Smokeless Tobacco Never   Smoking: no smoking x 1 month!  Change in weight: up a couple lbs  Exercise -relatively active  Diet -decreasing saturated fats, cheese, mexican foods        Objective      /84 (BP Location: Left arm, Patient Position: Sitting, BP Cuff Size: Large adult)   Pulse 80   Ht 1.702 m (5' 7\")   Wt 91.2 kg (201 lb)   BMI 31.48 kg/m²     Physical Exam  Vitals reviewed.   Constitutional:       Appearance: Normal appearance. She is well-developed.   HENT:      Head: Normocephalic and atraumatic.   Neck:      Vascular: No carotid bruit.   Cardiovascular:      Rate and Rhythm: Regular rhythm. Tachycardia present.      Heart sounds: Normal heart sounds. No murmur heard.  Pulmonary:      Effort: Pulmonary effort is normal. No respiratory distress.      Breath sounds: No wheezing or rales.   Abdominal:      Tenderness: There is no abdominal tenderness.   Musculoskeletal:      Right lower leg: No edema.      Left lower leg: No edema.   Skin:     General: Skin is warm and dry.      Coloration: Skin is not pale.   Neurological:      General: No focal deficit present.      Mental Status: She is alert and oriented to person, place, and time. Mental status is at baseline.      Cranial Nerves: No cranial nerve deficit.   Psychiatric:         Mood and Affect: Mood normal.         Behavior: Behavior normal.       DATA REVIEW:    Blood work June " 2023  Glucose 173  BUN 17, creatinine 0.77, GFR 99  Total cholesterol 177, triglycerides 567, LDL 61  A1c is 8.3    VASCULAR IMAGING:     Stress echo 8/27/19  Good functional capacity.  negative stress echo.        1. Essential hypertension  Comp Metabolic Panel    CBC WITHOUT DIFFERENTIAL    MICROALBUMIN CREAT RATIO URINE      2. Dyslipidemia  APOLIPOPROTEIN B    Lipid Profile      3. Type 2 diabetes mellitus without complication, without long-term current use of insulin (HCC)  HEMOGLOBIN A1C        ASSESSMENT AND PLAN  Patient Type, check all that apply:   Primary Prevention, Type 2 Diabetes Mellitus and Metabolic Syndrome     Lipid management  ACC AHA goal LDL less than 100  Mostly characterized by hypertriglyceridemia  No known history of pancreatitis  LDL at goal  Triglycerides and non-HDL poorly controlled  Benefits of multiple lipid-lowering medications outweighs risk  Lp(a) low   Plan:  -Continue atorvastatin 40mg daily   -Continue fenofibrate  -Continue icosapent ethyl  -Continue vit d 2000u/day  -Diet and exercise as per below  -Recheck lipid panel, lipoprotein a, Apo B    Blood pressure management  ACC AHA goal less than 130/80  At goal at least in the office  GFR and electrolytes stable  Plan:  -Continue losartan HCT  -Encouraged home blood pressure monitoring-- check 1-2 times per week especially due to weight loss, may need to adjust medications if she continues to lose weight  -Recheck GFR, electrolytes, urine for albumin prior to next visit    Diabetes/impaired fasting glucose - Type 2 DM  Goal A1c < 7  Lab Results   Component Value Date    HBA1C 7.9 (H) 02/13/2025      Lab Results   Component Value Date/Time    MICRALB 5.5 08/08/2024 09:26 AM   A1C much worse than previous  As discussed today, she will likely need a GLP-1 to manage her diabetes.    he is reluctant to try another option at this time as she wants to try to intensify her diet and exercise.    She doesn't think she would qualify for  PAP.  She will remain on metformin and farxiga.    Through SDM she will attempt to significantly change her current dietary and exercise habits to reduce her A1C .   Has not followed up with DM clinic in 6 months   Plan:  -Follow up with DM clinic as scheduled today  - Stressed importance of better control of A1C   - Cont metformin and farxiga   - Check A1C per DM clinic     Antiplatelet anticoagulant therapy -not currently indicated    Tobacco use - Quit 1 month ago!    She is under a lot of stress but wants to maintain   She wants to continue Chantix  - Continue chanitx x 3 months   - Continue to evaluate at every visit    Exercise therapy -continue much improved exercise habits.  Try to do something every day    Weight management and eating plan - continue more healthy eating plan as discussed in DM clinic visit.  Continue to decrease simple carbohydrates.  Eat more high protein and high complex carbohydrates.  Hopefully as we escalate the dose of her SGLT2 we will begin to see considerable weight loss.  Continue to limit alcohol.     Studies Ordered at Todays Visit: none   Blood Work Ordered At Today’s visit: none currently   Follow-Up: 3 months vasc med; DM clinic regular visits     BLAIR Meadows    Cc: JAMES Pollock

## 2025-07-09 NOTE — PROGRESS NOTES
Patient Consult Note    Primary care physician: Ana Lilia Barr M.D.    Reason for Consult: Management of Uncontrolled Type 2 Diabetes    Date Referral Placed: 07/09/25    HPI:  Gladys Lombardi is a 44 y.o. old patient who comes in today for evaluation of above stated problem.    Allergies  Patient has no known allergies.    Current Diabetes Medication Regimen  Metformin ER: 750mg daily  SGLT-2 Inhibitor: dapagliflozin (Farxiga) 10 mg daily      Previous Diabetes Medications and Reason for Discontinuation  Trulicity - nausea  Metformin IR - GI upset  Piolgitazone - unknown  Glipizide - unknown  Januvia - unknown     Potential Barriers to Care:  Adherence: denies missed doses  Side effects: nausea with Trulicity - self discontinued Fall 2024  Affordability: covered by insurance    SMBG  Pt has home glucometer and proper testing technique - Joey 3 CGM    Pt reports blood sugars:   Average  7 day 144  14 day 177  30 day 182  90 day 182    TIR  7 day 93%  14 day 62%  30 day 55%  90 day 55%      Hypoglycemia  Hypoglycemia awareness: Yes  Nocturnal hypoglycemia: None  Hypoglycemia:  None  Pt's treatment of Hypoglycemia  Discussed 15:15 Rule    Lifestyle  Current Exercise - walking 15 min every other day     Dietary - admits to poor diet d/t life stressors. Not keeping track of diet but mainly Mexican food, pasta, mostly carb heavy    Labs  Lab Results   Component Value Date/Time    HBA1C 7.8 (A) 07/09/2025 12:00 AM    HBA1C 7.9 (H) 02/13/2025 08:38 AM    HBA1C 6.6 (H) 08/08/2024 09:26 AM    HBA1C 7.0 (H) 01/04/2024 08:58 AM    HBA1C 8 (A) 06/07/2022 12:00 AM    HBA1C 8.3 (A) 10/13/2021 03:20 PM      Lab Results   Component Value Date/Time    SODIUM 138 08/08/2024 09:26 AM    SODIUM 133 (L) 04/14/2018 02:01 AM    POTASSIUM 4.3 08/08/2024 09:26 AM    POTASSIUM 3.6 04/14/2018 02:01 AM    CHLORIDE 104 08/08/2024 09:26 AM    CHLORIDE 102 04/14/2018 02:01 AM    CO2 20 08/08/2024 09:26 AM    CO2 21 04/14/2018 02:01 AM     "GLUCOSE 164 (H) 08/08/2024 09:26 AM    GLUCOSE 189 (H) 04/14/2018 02:01 AM    BUN 21 08/08/2024 09:26 AM    BUN 15 04/14/2018 02:01 AM    CREATININE 0.94 08/08/2024 09:26 AM    CREATININE 0.69 04/14/2018 02:01 AM    BUNCREATRAT 22 08/08/2024 09:26 AM     Lab Results   Component Value Date/Time    ALKPHOSPHAT 44 08/08/2024 09:26 AM    ALKPHOSPHAT 74 04/14/2018 02:01 AM    ASTSGOT 18 08/08/2024 09:26 AM    ASTSGOT 19 04/14/2018 02:01 AM    ALTSGPT 21 08/08/2024 09:26 AM    ALTSGPT 27 04/14/2018 02:01 AM    TBILIRUBIN 0.3 08/08/2024 09:26 AM    TBILIRUBIN 0.5 04/14/2018 02:01 AM    ALBUMIN 4.5 08/08/2024 09:26 AM    ALBUMIN 4.3 04/14/2018 02:01 AM      Lab Results   Component Value Date/Time    CHOLSTRLTOT 176 02/13/2025 08:38 AM    LDL 96 03/04/2020 11:10 AM    HDL 38 (L) 02/13/2025 08:38 AM    TRIGLYCERIDE 334 (H) 02/13/2025 08:38 AM       No results found for: \"MALBCRT\", \"MICROALBUR\"    Physical Examination:  Vital signs: There were no vitals taken for this visit. There is no height or weight on file to calculate BMI.    Assessment and Plan:    1. DM2  Pt has been lost to f/u since 08/2024  Discussed Goals: FBG 80 - 130, 2hPP < 180, a1c < 7.0%  Last a1c drawn on 07/09/25 was 7.8%, which is not at goal and has improved since the previous reading  Pt self discontinued Trulicity d/t nausea. Pt is amenable to trying Ozempic  She admits to poor diet d/t life stressors but is motivated to be back on track    - Medication changes:  START Ozempic 0.25mg q 7 days  - Continue:  Metformin ER 750mg daily  Farxiga 10mg daily     - Lifestyle changes:  Exercise Goal - increase as tolerated  Dietary Goal - resume increased intake of protein & fiber; minimize carbs    - Preventative management:  REC DM Score: n/a  Care gaps addressed:   N/A  Care gaps updated in Health Maintenance    Follow Up:  4 weeks    Karrie Canales, PharmD    CC:   Ana Lilia Barr M.D.    "

## 2025-07-10 ENCOUNTER — PATIENT MESSAGE (OUTPATIENT)
Dept: VASCULAR LAB | Facility: MEDICAL CENTER | Age: 45
End: 2025-07-10
Payer: COMMERCIAL

## 2025-07-10 ENCOUNTER — TELEPHONE (OUTPATIENT)
Dept: VASCULAR LAB | Facility: MEDICAL CENTER | Age: 45
End: 2025-07-10
Payer: COMMERCIAL

## 2025-07-10 NOTE — TELEPHONE ENCOUNTER
Prior Authorization for Semaglutide,0.25 or 0.5MG/DOS, (OZEMPIC, 0.25 OR 0.5 MG/DOSE,) 2 MG/3ML Solution Pen-injector.  (Quantity: 3 mls, Days: 42) has been submitted via Phone: [phone], [place]     Insurance: RX BCBS     Will follow up in 24-48 business hours.     LENNY Lake, PhT  Vascular Pharmacy Liaison (Rx Coordinator)  P: [phone]  7/10/2025 4:41 PM

## 2025-07-10 NOTE — TELEPHONE ENCOUNTER
Received New Start request via EMR  for Semaglutide,0.25 or 0.5MG/DOS, (OZEMPIC, 0.25 OR 0.5 MG/DOSE,) 2 MG/3ML Solution Pen-injector. (Quantity: 3 mls, Day Supply:42)     Insurance: RX BCBS   Member ID:  [id number]  BIN: [id number]  PCN: [id number]  Group: [id number]     Ran Test claim via [place] & medication Rejects stating prior authorization is required.    LENNY Lake, PhT  Vascular Pharmacy Liaison (Rx Coordinator)  P: [phone]  7/10/2025 4:37 PM

## 2025-07-10 NOTE — PROGRESS NOTES
Was unable to discuss PA auth needed for Joey sensors at yesterday's appt. Asked pt to send me pictures of her insurance card via [facility]  Karrie Canales, PharmD

## 2025-07-10 NOTE — TELEPHONE ENCOUNTER
New PA submitted for Semaglutide,0.25 or 0.5MG/DOS, (OZEMPIC, 0.25 OR 0.5 MG/DOSE,) 2 MG/3ML Solution Pen-injector  has been approved for a quantity of 3 mls , day supply 42    PA reference number: N/A  Insurance: RX BCBS   Effective dates: 07/10/2025-07/10/2026  Copay: $129.99     Is patient eligible to fill with [facility]? Yes    Next Steps: The patient's copay exceeds $5.00. Proceed with contacting patient to offer financial assistance.    LENNY Lake, PhT  Vascular Pharmacy Liaison (Rx Coordinator)  P: [phone]  7/10/2025 4:50 PM

## 2025-07-18 ENCOUNTER — PATIENT MESSAGE (OUTPATIENT)
Dept: VASCULAR LAB | Facility: MEDICAL CENTER | Age: 45
End: 2025-07-18
Payer: COMMERCIAL

## 2025-08-06 ENCOUNTER — TELEPHONE (OUTPATIENT)
Dept: HEALTH INFORMATION MANAGEMENT | Facility: OTHER | Age: 45
End: 2025-08-06
Payer: COMMERCIAL

## 2025-08-26 ENCOUNTER — TELEPHONE (OUTPATIENT)
Dept: NEUROLOGY | Facility: MEDICAL CENTER | Age: 45
End: 2025-08-26

## 2025-08-26 ENCOUNTER — OFFICE VISIT (OUTPATIENT)
Dept: NEUROLOGY | Facility: MEDICAL CENTER | Age: 45
End: 2025-08-26
Attending: PSYCHIATRY & NEUROLOGY
Payer: COMMERCIAL

## 2025-08-26 VITALS
OXYGEN SATURATION: 95 % | BODY MASS INDEX: 32.04 KG/M2 | DIASTOLIC BLOOD PRESSURE: 68 MMHG | TEMPERATURE: 96.9 F | HEART RATE: 100 BPM | HEIGHT: 67 IN | SYSTOLIC BLOOD PRESSURE: 110 MMHG | WEIGHT: 204.15 LBS

## 2025-08-26 DIAGNOSIS — G50.0 TRIGEMINAL NEURALGIA OF RIGHT SIDE OF FACE: ICD-10-CM

## 2025-08-26 DIAGNOSIS — G43.709 CHRONIC MIGRAINE WITHOUT AURA WITHOUT STATUS MIGRAINOSUS, NOT INTRACTABLE: Primary | ICD-10-CM

## 2025-08-26 PROCEDURE — 3078F DIAST BP <80 MM HG: CPT | Performed by: PSYCHIATRY & NEUROLOGY

## 2025-08-26 PROCEDURE — 99214 OFFICE O/P EST MOD 30 MIN: CPT | Performed by: PSYCHIATRY & NEUROLOGY

## 2025-08-26 PROCEDURE — 99215 OFFICE O/P EST HI 40 MIN: CPT | Performed by: PSYCHIATRY & NEUROLOGY

## 2025-08-26 PROCEDURE — 3074F SYST BP LT 130 MM HG: CPT | Performed by: PSYCHIATRY & NEUROLOGY

## 2025-08-26 RX ORDER — OXCARBAZEPINE 150 MG/1
TABLET, FILM COATED ORAL
Qty: 67 TABLET | Refills: 0 | Status: SHIPPED | OUTPATIENT
Start: 2025-08-26 | End: 2025-10-02

## 2025-08-26 RX ORDER — AMOXICILLIN 500 MG/1
TABLET, FILM COATED ORAL
COMMUNITY
Start: 2025-08-19

## 2025-08-26 RX ORDER — ELETRIPTAN HYDROBROMIDE 40 MG/1
TABLET, FILM COATED ORAL
Qty: 12 TABLET | Refills: 11 | Status: SHIPPED | OUTPATIENT
Start: 2025-08-26

## 2025-08-26 RX ORDER — HYDROXYZINE HYDROCHLORIDE 10 MG/1
TABLET, FILM COATED ORAL
COMMUNITY
Start: 2025-08-02

## 2025-08-26 ASSESSMENT — PATIENT HEALTH QUESTIONNAIRE - PHQ9
SUM OF ALL RESPONSES TO PHQ QUESTIONS 1-9: 16
5. POOR APPETITE OR OVEREATING: 2 - MORE THAN HALF THE DAYS
CLINICAL INTERPRETATION OF PHQ2 SCORE: 3

## 2025-08-26 ASSESSMENT — LIFESTYLE VARIABLES
HOW OFTEN DO YOU HAVE SIX OR MORE DRINKS ON ONE OCCASION: NEVER
HOW MANY STANDARD DRINKS CONTAINING ALCOHOL DO YOU HAVE ON A TYPICAL DAY: PATIENT DOES NOT DRINK
HOW OFTEN DO YOU HAVE A DRINK CONTAINING ALCOHOL: NEVER
AUDIT-C TOTAL SCORE: 0
SKIP TO QUESTIONS 9-10: 1